# Patient Record
Sex: MALE | Race: WHITE | NOT HISPANIC OR LATINO
[De-identification: names, ages, dates, MRNs, and addresses within clinical notes are randomized per-mention and may not be internally consistent; named-entity substitution may affect disease eponyms.]

---

## 2023-04-21 PROBLEM — Z00.00 ENCOUNTER FOR PREVENTIVE HEALTH EXAMINATION: Status: ACTIVE | Noted: 2023-04-21

## 2023-04-24 ENCOUNTER — APPOINTMENT (OUTPATIENT)
Dept: UROLOGY | Facility: CLINIC | Age: 73
End: 2023-04-24
Payer: MEDICARE

## 2023-04-24 VITALS
OXYGEN SATURATION: 98 % | TEMPERATURE: 97.8 F | HEART RATE: 93 BPM | DIASTOLIC BLOOD PRESSURE: 73 MMHG | SYSTOLIC BLOOD PRESSURE: 110 MMHG

## 2023-04-24 DIAGNOSIS — Z85.46 PERSONAL HISTORY OF MALIGNANT NEOPLASM OF PROSTATE: ICD-10-CM

## 2023-04-24 DIAGNOSIS — Z87.19 PERSONAL HISTORY OF OTHER DISEASES OF THE DIGESTIVE SYSTEM: ICD-10-CM

## 2023-04-24 DIAGNOSIS — Z87.442 PERSONAL HISTORY OF URINARY CALCULI: ICD-10-CM

## 2023-04-24 PROCEDURE — 99204 OFFICE O/P NEW MOD 45 MIN: CPT

## 2023-04-24 RX ORDER — ENALAPRIL MALEATE AND HYDROCHLOROTHIAZIDE 10; 25 MG/1; MG/1
10-25 TABLET ORAL
Qty: 90 | Refills: 0 | Status: ACTIVE | COMMUNITY
Start: 2023-03-12

## 2023-04-24 RX ORDER — FINASTERIDE 5 MG/1
5 TABLET, FILM COATED ORAL
Qty: 90 | Refills: 0 | Status: ACTIVE | COMMUNITY
Start: 2022-12-21

## 2023-04-24 RX ORDER — FUROSEMIDE 20 MG/1
20 TABLET ORAL
Qty: 30 | Refills: 0 | Status: ACTIVE | COMMUNITY
Start: 2022-10-21

## 2023-04-24 RX ORDER — DILTIAZEM HYDROCHLORIDE 180 MG/1
180 CAPSULE, EXTENDED RELEASE ORAL
Qty: 90 | Refills: 0 | Status: ACTIVE | COMMUNITY
Start: 2022-07-12

## 2023-04-24 NOTE — ASSESSMENT
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  We discussed options for managing his retention including continuing indwelling portillo vs. CIC vs. SPT.  We also discussed the possibility of neurostimulation for atonic bladder.  I agree with his previous urologist that a bladder outlet procedure is unlikely to help him void given the lack of bladder function demonstrated on urodynamics. He is willing to learn CIC and will return earlier in the day for CIC teaching.  \par \par Plan:\par 1. RTC for CIC teaching\par 2. Cont indwelling portillo till next visit.

## 2023-04-24 NOTE — PHYSICAL EXAM
[General Appearance - Well Developed] : well developed [Normal Appearance] : normal appearance [Heart Rate And Rhythm] : Heart rate and rhythm were normal [] : no respiratory distress [Abdomen Soft] : soft [Abdomen Tenderness] : non-tender [Abdomen Hernia] : no hernia was discovered [Costovertebral Angle Tenderness] : no ~M costovertebral angle tenderness [Epididymis] : the epididymides were normal [Testes Tenderness] : no tenderness of the testes [Testes Mass (___cm)] : there were no testicular masses [Prostate Tenderness] : the prostate was not tender [No Prostate Nodules] : no prostate nodules [Prostate Size ___ (0-4)] : prostate size [unfilled] (scale: 0-4) [Normal Station and Gait] : the gait and station were normal for the patient's age [Skin Color & Pigmentation] : normal skin color and pigmentation [No Focal Deficits] : no focal deficits [Oriented To Time, Place, And Person] : oriented to person, place, and time [FreeTextEntry1] : Eroded urethral meatus, 18 Fr portillo catheter draining urine

## 2023-04-24 NOTE — HISTORY OF PRESENT ILLNESS
[FreeTextEntry1] : 71 yo male with hx of prostate cancer s/p brachytherapy at least 8 years presents for a second opinion regarding his chronic retention of urine.  He has had a catheter in place since January after going into urinary retention.  His catheter has been changed monthly.  He has failed multiple TOV.  He had a urodynamic test on March 6th which showed a desensate bladder with no detrusor activity.  He has a normal bladder capacity.  He had a cystoscopy in February 2022 which showed a small friable prostate without significant obstruction of the bladder neck.  He has been advised to learn CIC vs. have an SPT placed.  \par \par His catheter was most recently changed last week.  \par \par PSA from April 2023 was < 0.04.  He was recently treated for a proteus UTI.

## 2023-05-15 ENCOUNTER — APPOINTMENT (OUTPATIENT)
Dept: UROLOGY | Facility: CLINIC | Age: 73
End: 2023-05-15
Payer: MEDICARE

## 2023-05-15 PROCEDURE — 99213 OFFICE O/P EST LOW 20 MIN: CPT | Mod: 25

## 2023-05-15 PROCEDURE — 51702 INSERT TEMP BLADDER CATH: CPT

## 2023-05-15 NOTE — HISTORY OF PRESENT ILLNESS
[FreeTextEntry1] : 4/24/23:\par 71 yo male with hx of prostate cancer s/p brachytherapy at least 8 years presents for a second opinion regarding his chronic retention of urine.  He has had a catheter in place since January after going into urinary retention.  His catheter has been changed monthly.  He has failed multiple TOV.  He had a urodynamic test on March 6th which showed a desensate bladder with no detrusor activity.  He has a normal bladder capacity.  He had a cystoscopy in February 2022 which showed a small friable prostate without significant obstruction of the bladder neck.  He has been advised to learn CIC vs. have an SPT placed.  \par \par His catheter was most recently changed last week.  \par \par PSA from April 2023 was < 0.04.  He was recently treated for a proteus UTI. \par \par *************\par 5/15/23:\par Patient returns for follow up for CIC teaching.  \par \par CIC teaching was provided by the nursing staff.  Patient is not able to perform CIC, however his wife is able to perform CIC.  Unfortunately, the patient's wife works long days and is not home from early in the morning till later at night.  She is concerned he would not be able to manage with his urinary issues without a catheter while she is out.

## 2023-05-15 NOTE — ASSESSMENT
[FreeTextEntry1] : 71 yo male with atonic bladder in chronic retention with indwelling catheter.  The catheter was changed today.  The patient will return monthly for catheter changes.  I also discussed the option of placement of a suprapubic cystostomy tube as an alternative to a urethral catheter.  The SP tubes tend to be more comfortable than urethral catheter and it avoids the problem of urethra/penile erosion.  The infection risk, however, is no different than an indwelling urethral catheter.  The patient, his wife, and daughter will consider his options and will notify me of how they would like to manage his catheter long term.  \par \par \par Plan:\par 1. Catheter changed today\par 2. RTC 1 month for catheter change, family to decide on possible SP tube placement.

## 2023-05-15 NOTE — PHYSICAL EXAM
[General Appearance - Well Developed] : well developed [Normal Appearance] : normal appearance [Abdomen Soft] : soft [Penis Abnormality] : normal circumcised penis [Skin Color & Pigmentation] : normal skin color and pigmentation [] : no respiratory distress [Heart Rate And Rhythm] : Heart rate and rhythm were normal [Oriented To Time, Place, And Person] : oriented to person, place, and time [Normal Station and Gait] : the gait and station were normal for the patient's age [No Focal Deficits] : no focal deficits [FreeTextEntry1] : Catheter to gravity drainage

## 2023-06-08 ENCOUNTER — APPOINTMENT (OUTPATIENT)
Dept: UROLOGY | Facility: CLINIC | Age: 73
End: 2023-06-08
Payer: MEDICARE

## 2023-06-08 PROCEDURE — 51702 INSERT TEMP BLADDER CATH: CPT

## 2023-06-12 LAB — BACTERIA UR CULT: NORMAL

## 2023-06-16 ENCOUNTER — APPOINTMENT (OUTPATIENT)
Dept: UROLOGY | Facility: CLINIC | Age: 73
End: 2023-06-16

## 2023-07-03 ENCOUNTER — NON-APPOINTMENT (OUTPATIENT)
Age: 73
End: 2023-07-03

## 2023-07-03 VITALS
OXYGEN SATURATION: 100 % | DIASTOLIC BLOOD PRESSURE: 73 MMHG | HEART RATE: 93 BPM | RESPIRATION RATE: 19 BRPM | SYSTOLIC BLOOD PRESSURE: 139 MMHG | TEMPERATURE: 98 F

## 2023-07-03 LAB
APPEARANCE UR: CLEAR — SIGNIFICANT CHANGE UP
BACTERIA # UR AUTO: PRESENT /HPF
BILIRUB UR-MCNC: NEGATIVE — SIGNIFICANT CHANGE UP
COLOR SPEC: YELLOW — SIGNIFICANT CHANGE UP
COMMENT - URINE: SIGNIFICANT CHANGE UP
DIFF PNL FLD: ABNORMAL
EPI CELLS # UR: SIGNIFICANT CHANGE UP /HPF (ref 0–5)
GLUCOSE UR QL: NEGATIVE — SIGNIFICANT CHANGE UP
KETONES UR-MCNC: NEGATIVE — SIGNIFICANT CHANGE UP
LEUKOCYTE ESTERASE UR-ACNC: ABNORMAL
NITRITE UR-MCNC: NEGATIVE — SIGNIFICANT CHANGE UP
PH UR: >=9 — SIGNIFICANT CHANGE UP (ref 5–8)
PROT UR-MCNC: 30 MG/DL
RBC CASTS # UR COMP ASSIST: ABNORMAL /HPF
SP GR SPEC: 1.01 — SIGNIFICANT CHANGE UP (ref 1–1.03)
TRI-PHOS CRY UR QL COMP ASSIST: ABNORMAL /HPF
UROBILINOGEN FLD QL: 0.2 E.U./DL — SIGNIFICANT CHANGE UP
WBC UR QL: ABNORMAL /HPF

## 2023-07-03 PROCEDURE — 99285 EMERGENCY DEPT VISIT HI MDM: CPT

## 2023-07-03 NOTE — ED ADULT TRIAGE NOTE - ARRIVAL INFO ADDITIONAL COMMENTS
Patient reported to [facility] "blood in urine." Patient with indwelling portillo for chronic BPH. Last reported change for portillo reported 06/08/2023.

## 2023-07-03 NOTE — ED ADULT NURSE NOTE - OBJECTIVE STATEMENT
Pt is 73M [facility] for outpt labs drawn 6/29/2023 showing hgb 8.3 and blood in indwelling urethral catheter. Pt states "when I don't drink the urine is like broth." Pt is A&Ox4, appears pale, w yellow urine draining into leg bag w foul odor. Pt has hx UTI. Denies blood in stool.

## 2023-07-04 ENCOUNTER — INPATIENT (INPATIENT)
Facility: HOSPITAL | Age: 73
LOS: 5 days | Discharge: HOME HEALTH | DRG: 699 | End: 2023-07-10
Attending: STUDENT IN AN ORGANIZED HEALTH CARE EDUCATION/TRAINING PROGRAM | Admitting: STUDENT IN AN ORGANIZED HEALTH CARE EDUCATION/TRAINING PROGRAM
Payer: MEDICARE

## 2023-07-04 ENCOUNTER — NON-APPOINTMENT (OUTPATIENT)
Age: 73
End: 2023-07-04

## 2023-07-04 ENCOUNTER — TRANSCRIPTION ENCOUNTER (OUTPATIENT)
Age: 73
End: 2023-07-04

## 2023-07-04 DIAGNOSIS — E87.20 ACIDOSIS, UNSPECIFIED: ICD-10-CM

## 2023-07-04 DIAGNOSIS — Z87.438 PERSONAL HISTORY OF OTHER DISEASES OF MALE GENITAL ORGANS: ICD-10-CM

## 2023-07-04 DIAGNOSIS — D64.9 ANEMIA, UNSPECIFIED: ICD-10-CM

## 2023-07-04 DIAGNOSIS — I48.91 UNSPECIFIED ATRIAL FIBRILLATION: ICD-10-CM

## 2023-07-04 DIAGNOSIS — I10 ESSENTIAL (PRIMARY) HYPERTENSION: ICD-10-CM

## 2023-07-04 DIAGNOSIS — C61 MALIGNANT NEOPLASM OF PROSTATE: ICD-10-CM

## 2023-07-04 DIAGNOSIS — N17.9 ACUTE KIDNEY FAILURE, UNSPECIFIED: ICD-10-CM

## 2023-07-04 DIAGNOSIS — Z29.9 ENCOUNTER FOR PROPHYLACTIC MEASURES, UNSPECIFIED: ICD-10-CM

## 2023-07-04 DIAGNOSIS — N39.0 URINARY TRACT INFECTION, SITE NOT SPECIFIED: ICD-10-CM

## 2023-07-04 DIAGNOSIS — N31.9 NEUROMUSCULAR DYSFUNCTION OF BLADDER, UNSPECIFIED: ICD-10-CM

## 2023-07-04 LAB
ALBUMIN SERPL ELPH-MCNC: 2.7 G/DL — LOW (ref 3.3–5)
ALBUMIN SERPL ELPH-MCNC: 2.8 G/DL — LOW (ref 3.3–5)
ALBUMIN, RANDOM URINE: 13.9 MG/DL — SIGNIFICANT CHANGE UP
ALBUMIN/CREATININE RATIO (ACR): 556 MG/G — HIGH (ref 0–30)
ALP SERPL-CCNC: 73 U/L — SIGNIFICANT CHANGE UP (ref 40–120)
ALP SERPL-CCNC: 82 U/L — SIGNIFICANT CHANGE UP (ref 40–120)
ALT FLD-CCNC: 10 U/L — SIGNIFICANT CHANGE UP (ref 10–45)
ALT FLD-CCNC: 11 U/L — SIGNIFICANT CHANGE UP (ref 10–45)
ANION GAP SERPL CALC-SCNC: 12 MMOL/L — SIGNIFICANT CHANGE UP (ref 5–17)
ANION GAP SERPL CALC-SCNC: 14 MMOL/L — SIGNIFICANT CHANGE UP (ref 5–17)
APTT BLD: 28.6 SEC — SIGNIFICANT CHANGE UP (ref 27.5–35.5)
AST SERPL-CCNC: 14 U/L — SIGNIFICANT CHANGE UP (ref 10–40)
AST SERPL-CCNC: 18 U/L — SIGNIFICANT CHANGE UP (ref 10–40)
BASE EXCESS BLDV CALC-SCNC: -7 MMOL/L — LOW (ref -2–3)
BASOPHILS # BLD AUTO: 0.03 K/UL — SIGNIFICANT CHANGE UP (ref 0–0.2)
BASOPHILS # BLD AUTO: 0.03 K/UL — SIGNIFICANT CHANGE UP (ref 0–0.2)
BASOPHILS NFR BLD AUTO: 0.3 % — SIGNIFICANT CHANGE UP (ref 0–2)
BASOPHILS NFR BLD AUTO: 0.4 % — SIGNIFICANT CHANGE UP (ref 0–2)
BILIRUB SERPL-MCNC: 0.2 MG/DL — SIGNIFICANT CHANGE UP (ref 0.2–1.2)
BILIRUB SERPL-MCNC: 0.2 MG/DL — SIGNIFICANT CHANGE UP (ref 0.2–1.2)
BLD GP AB SCN SERPL QL: NEGATIVE — SIGNIFICANT CHANGE UP
BUN SERPL-MCNC: 72 MG/DL — HIGH (ref 7–23)
BUN SERPL-MCNC: 78 MG/DL — HIGH (ref 7–23)
CA-I SERPL-SCNC: 1.29 MMOL/L — SIGNIFICANT CHANGE UP (ref 1.15–1.33)
CALCIUM SERPL-MCNC: 8.7 MG/DL — SIGNIFICANT CHANGE UP (ref 8.4–10.5)
CALCIUM SERPL-MCNC: 8.8 MG/DL — SIGNIFICANT CHANGE UP (ref 8.4–10.5)
CHLORIDE SERPL-SCNC: 104 MMOL/L — SIGNIFICANT CHANGE UP (ref 96–108)
CHLORIDE SERPL-SCNC: 107 MMOL/L — SIGNIFICANT CHANGE UP (ref 96–108)
CO2 BLDV-SCNC: 18.8 MMOL/L — LOW (ref 22–26)
CO2 SERPL-SCNC: 15 MMOL/L — LOW (ref 22–31)
CO2 SERPL-SCNC: 17 MMOL/L — LOW (ref 22–31)
CREAT ?TM UR-MCNC: 25 MG/DL — SIGNIFICANT CHANGE UP
CREAT ?TM UR-MCNC: 25 MG/DL — SIGNIFICANT CHANGE UP
CREAT ?TM UR-MCNC: 26 MG/DL — SIGNIFICANT CHANGE UP
CREAT SERPL-MCNC: 4.25 MG/DL — HIGH (ref 0.5–1.3)
CREAT SERPL-MCNC: 4.3 MG/DL — HIGH (ref 0.5–1.3)
EGFR: 14 ML/MIN/1.73M2 — LOW
EGFR: 14 ML/MIN/1.73M2 — LOW
EOSINOPHIL # BLD AUTO: 0 K/UL — SIGNIFICANT CHANGE UP (ref 0–0.5)
EOSINOPHIL # BLD AUTO: 0 K/UL — SIGNIFICANT CHANGE UP (ref 0–0.5)
EOSINOPHIL NFR BLD AUTO: 0 % — SIGNIFICANT CHANGE UP (ref 0–6)
EOSINOPHIL NFR BLD AUTO: 0 % — SIGNIFICANT CHANGE UP (ref 0–6)
FERRITIN SERPL-MCNC: 133 NG/ML — SIGNIFICANT CHANGE UP (ref 30–400)
GAS PNL BLDV: 130 MMOL/L — LOW (ref 136–145)
GAS PNL BLDV: SIGNIFICANT CHANGE UP
GAS PNL BLDV: SIGNIFICANT CHANGE UP
GLUCOSE SERPL-MCNC: 84 MG/DL — SIGNIFICANT CHANGE UP (ref 70–99)
GLUCOSE SERPL-MCNC: 97 MG/DL — SIGNIFICANT CHANGE UP (ref 70–99)
HAV IGM SER-ACNC: SIGNIFICANT CHANGE UP
HBV CORE AB SER-ACNC: SIGNIFICANT CHANGE UP
HBV CORE IGM SER-ACNC: SIGNIFICANT CHANGE UP
HBV SURFACE AB SER-ACNC: SIGNIFICANT CHANGE UP
HBV SURFACE AG SER-ACNC: SIGNIFICANT CHANGE UP
HCO3 BLDV-SCNC: 18 MMOL/L — LOW (ref 22–29)
HCT VFR BLD CALC: 24.5 % — LOW (ref 39–50)
HCT VFR BLD CALC: 25 % — LOW (ref 39–50)
HCV AB S/CO SERPL IA: 0.05 S/CO — SIGNIFICANT CHANGE UP
HCV AB SERPL-IMP: SIGNIFICANT CHANGE UP
HGB BLD-MCNC: 7.7 G/DL — LOW (ref 13–17)
HGB BLD-MCNC: 8.1 G/DL — LOW (ref 13–17)
IMM GRANULOCYTES NFR BLD AUTO: 0.3 % — SIGNIFICANT CHANGE UP (ref 0–0.9)
IMM GRANULOCYTES NFR BLD AUTO: 0.5 % — SIGNIFICANT CHANGE UP (ref 0–0.9)
INR BLD: 1.37 — HIGH (ref 0.88–1.16)
IRON SATN MFR SERPL: 13 % — LOW (ref 16–55)
IRON SATN MFR SERPL: 22 UG/DL — LOW (ref 45–165)
LACTATE SERPL-SCNC: 0.6 MMOL/L — SIGNIFICANT CHANGE UP (ref 0.5–2)
LYMPHOCYTES # BLD AUTO: 1.21 K/UL — SIGNIFICANT CHANGE UP (ref 1–3.3)
LYMPHOCYTES # BLD AUTO: 1.36 K/UL — SIGNIFICANT CHANGE UP (ref 1–3.3)
LYMPHOCYTES # BLD AUTO: 13.5 % — SIGNIFICANT CHANGE UP (ref 13–44)
LYMPHOCYTES # BLD AUTO: 16.6 % — SIGNIFICANT CHANGE UP (ref 13–44)
MAGNESIUM SERPL-MCNC: 1.6 MG/DL — SIGNIFICANT CHANGE UP (ref 1.6–2.6)
MCHC RBC-ENTMCNC: 27.6 PG — SIGNIFICANT CHANGE UP (ref 27–34)
MCHC RBC-ENTMCNC: 28.1 PG — SIGNIFICANT CHANGE UP (ref 27–34)
MCHC RBC-ENTMCNC: 31.4 GM/DL — LOW (ref 32–36)
MCHC RBC-ENTMCNC: 32.4 GM/DL — SIGNIFICANT CHANGE UP (ref 32–36)
MCV RBC AUTO: 86.8 FL — SIGNIFICANT CHANGE UP (ref 80–100)
MCV RBC AUTO: 87.8 FL — SIGNIFICANT CHANGE UP (ref 80–100)
MONOCYTES # BLD AUTO: 0.77 K/UL — SIGNIFICANT CHANGE UP (ref 0–0.9)
MONOCYTES # BLD AUTO: 0.97 K/UL — HIGH (ref 0–0.9)
MONOCYTES NFR BLD AUTO: 10.6 % — SIGNIFICANT CHANGE UP (ref 2–14)
MONOCYTES NFR BLD AUTO: 9.6 % — SIGNIFICANT CHANGE UP (ref 2–14)
NEUTROPHILS # BLD AUTO: 5.26 K/UL — SIGNIFICANT CHANGE UP (ref 1.8–7.4)
NEUTROPHILS # BLD AUTO: 7.66 K/UL — HIGH (ref 1.8–7.4)
NEUTROPHILS NFR BLD AUTO: 72.1 % — SIGNIFICANT CHANGE UP (ref 43–77)
NEUTROPHILS NFR BLD AUTO: 76.1 % — SIGNIFICANT CHANGE UP (ref 43–77)
NRBC # BLD: 0 /100 WBCS — SIGNIFICANT CHANGE UP (ref 0–0)
NRBC # BLD: 0 /100 WBCS — SIGNIFICANT CHANGE UP (ref 0–0)
OSMOLALITY UR: 206 MOSM/KG — LOW (ref 300–900)
PCO2 BLDV: 33 MMHG — LOW (ref 42–55)
PH BLDV: 7.34 — SIGNIFICANT CHANGE UP (ref 7.32–7.43)
PHOSPHATE SERPL-MCNC: 4.1 MG/DL — SIGNIFICANT CHANGE UP (ref 2.5–4.5)
PLATELET # BLD AUTO: 242 K/UL — SIGNIFICANT CHANGE UP (ref 150–400)
PLATELET # BLD AUTO: 263 K/UL — SIGNIFICANT CHANGE UP (ref 150–400)
PO2 BLDV: 61 MMHG — HIGH (ref 25–45)
POTASSIUM BLDV-SCNC: 4.5 MMOL/L — SIGNIFICANT CHANGE UP (ref 3.5–5.1)
POTASSIUM SERPL-MCNC: 4.4 MMOL/L — SIGNIFICANT CHANGE UP (ref 3.5–5.3)
POTASSIUM SERPL-MCNC: 4.6 MMOL/L — SIGNIFICANT CHANGE UP (ref 3.5–5.3)
POTASSIUM SERPL-SCNC: 4.4 MMOL/L — SIGNIFICANT CHANGE UP (ref 3.5–5.3)
POTASSIUM SERPL-SCNC: 4.6 MMOL/L — SIGNIFICANT CHANGE UP (ref 3.5–5.3)
PROT ?TM UR-MCNC: 42 MG/DL — HIGH (ref 0–12)
PROT SERPL-MCNC: 5.8 G/DL — LOW (ref 6–8.3)
PROT SERPL-MCNC: 6.3 G/DL — SIGNIFICANT CHANGE UP (ref 6–8.3)
PROT/CREAT UR-RTO: 1.7 RATIO — HIGH (ref 0–0.2)
PROTHROM AB SERPL-ACNC: 16.3 SEC — HIGH (ref 10.5–13.4)
RBC # BLD: 2.79 M/UL — LOW (ref 4.2–5.8)
RBC # BLD: 2.88 M/UL — LOW (ref 4.2–5.8)
RBC # FLD: 14.7 % — HIGH (ref 10.3–14.5)
RBC # FLD: 14.7 % — HIGH (ref 10.3–14.5)
RH IG SCN BLD-IMP: POSITIVE — SIGNIFICANT CHANGE UP
SAO2 % BLDV: 93.3 % — HIGH (ref 67–88)
SODIUM SERPL-SCNC: 133 MMOL/L — LOW (ref 135–145)
SODIUM SERPL-SCNC: 136 MMOL/L — SIGNIFICANT CHANGE UP (ref 135–145)
SODIUM UR-SCNC: 47 MMOL/L — SIGNIFICANT CHANGE UP
TIBC SERPL-MCNC: 168 UG/DL — LOW (ref 220–430)
TRANSFERRIN SERPL-MCNC: 139 MG/DL — LOW (ref 200–360)
UIBC SERPL-MCNC: 146 UG/DL — SIGNIFICANT CHANGE UP (ref 110–370)
UUN UR-MCNC: 224 MG/DL — SIGNIFICANT CHANGE UP
WBC # BLD: 10.07 K/UL — SIGNIFICANT CHANGE UP (ref 3.8–10.5)
WBC # BLD: 7.29 K/UL — SIGNIFICANT CHANGE UP (ref 3.8–10.5)
WBC # FLD AUTO: 10.07 K/UL — SIGNIFICANT CHANGE UP (ref 3.8–10.5)
WBC # FLD AUTO: 7.29 K/UL — SIGNIFICANT CHANGE UP (ref 3.8–10.5)

## 2023-07-04 PROCEDURE — 76770 US EXAM ABDO BACK WALL COMP: CPT | Mod: 26

## 2023-07-04 PROCEDURE — 99223 1ST HOSP IP/OBS HIGH 75: CPT | Mod: GC

## 2023-07-04 PROCEDURE — 71045 X-RAY EXAM CHEST 1 VIEW: CPT | Mod: 26

## 2023-07-04 PROCEDURE — 99221 1ST HOSP IP/OBS SF/LOW 40: CPT

## 2023-07-04 RX ORDER — FINASTERIDE 5 MG/1
5 TABLET, FILM COATED ORAL DAILY
Refills: 0 | Status: DISCONTINUED | OUTPATIENT
Start: 2023-07-04 | End: 2023-07-05

## 2023-07-04 RX ORDER — PIPERACILLIN AND TAZOBACTAM 4; .5 G/20ML; G/20ML
4.5 INJECTION, POWDER, LYOPHILIZED, FOR SOLUTION INTRAVENOUS EVERY 12 HOURS
Refills: 0 | Status: DISCONTINUED | OUTPATIENT
Start: 2023-07-05 | End: 2023-07-05

## 2023-07-04 RX ORDER — DILTIAZEM HCL 120 MG
180 CAPSULE, EXT RELEASE 24 HR ORAL EVERY 24 HOURS
Refills: 0 | Status: DISCONTINUED | OUTPATIENT
Start: 2023-07-04 | End: 2023-07-05

## 2023-07-04 RX ORDER — LATANOPROST 0.05 MG/ML
1 SOLUTION/ DROPS OPHTHALMIC; TOPICAL AT BEDTIME
Refills: 0 | Status: DISCONTINUED | OUTPATIENT
Start: 2023-07-04 | End: 2023-07-05

## 2023-07-04 RX ORDER — CEFTRIAXONE 500 MG/1
1000 INJECTION, POWDER, FOR SOLUTION INTRAMUSCULAR; INTRAVENOUS ONCE
Refills: 0 | Status: COMPLETED | OUTPATIENT
Start: 2023-07-04 | End: 2023-07-04

## 2023-07-04 RX ORDER — DILTIAZEM HCL 120 MG
240 CAPSULE, EXT RELEASE 24 HR ORAL EVERY 24 HOURS
Refills: 0 | Status: DISCONTINUED | OUTPATIENT
Start: 2023-07-04 | End: 2023-07-04

## 2023-07-04 RX ORDER — PIPERACILLIN AND TAZOBACTAM 4; .5 G/20ML; G/20ML
4.5 INJECTION, POWDER, LYOPHILIZED, FOR SOLUTION INTRAVENOUS ONCE
Refills: 0 | Status: COMPLETED | OUTPATIENT
Start: 2023-07-04 | End: 2023-07-04

## 2023-07-04 RX ORDER — SODIUM BICARBONATE 1 MEQ/ML
650 SYRINGE (ML) INTRAVENOUS THREE TIMES A DAY
Refills: 0 | Status: DISCONTINUED | OUTPATIENT
Start: 2023-07-04 | End: 2023-07-05

## 2023-07-04 RX ORDER — LANOLIN ALCOHOL/MO/W.PET/CERES
3 CREAM (GRAM) TOPICAL AT BEDTIME
Refills: 0 | Status: DISCONTINUED | OUTPATIENT
Start: 2023-07-04 | End: 2023-07-05

## 2023-07-04 RX ORDER — PANTOPRAZOLE SODIUM 20 MG/1
40 TABLET, DELAYED RELEASE ORAL
Refills: 0 | Status: DISCONTINUED | OUTPATIENT
Start: 2023-07-04 | End: 2023-07-05

## 2023-07-04 RX ORDER — SODIUM CHLORIDE 9 MG/ML
1000 INJECTION INTRAMUSCULAR; INTRAVENOUS; SUBCUTANEOUS ONCE
Refills: 0 | Status: COMPLETED | OUTPATIENT
Start: 2023-07-04 | End: 2023-07-04

## 2023-07-04 RX ORDER — PIPERACILLIN AND TAZOBACTAM 4; .5 G/20ML; G/20ML
4.5 INJECTION, POWDER, LYOPHILIZED, FOR SOLUTION INTRAVENOUS ONCE
Refills: 0 | Status: COMPLETED | OUTPATIENT
Start: 2023-07-05 | End: 2023-07-05

## 2023-07-04 RX ORDER — TAMSULOSIN HYDROCHLORIDE 0.4 MG/1
0.4 CAPSULE ORAL AT BEDTIME
Refills: 0 | Status: DISCONTINUED | OUTPATIENT
Start: 2023-07-04 | End: 2023-07-05

## 2023-07-04 RX ORDER — SENNA PLUS 8.6 MG/1
2 TABLET ORAL AT BEDTIME
Refills: 0 | Status: DISCONTINUED | OUTPATIENT
Start: 2023-07-04 | End: 2023-07-05

## 2023-07-04 RX ORDER — POLYETHYLENE GLYCOL 3350 17 G/17G
17 POWDER, FOR SOLUTION ORAL DAILY
Refills: 0 | Status: DISCONTINUED | OUTPATIENT
Start: 2023-07-04 | End: 2023-07-05

## 2023-07-04 RX ORDER — MAGNESIUM SULFATE 500 MG/ML
2 VIAL (ML) INJECTION ONCE
Refills: 0 | Status: COMPLETED | OUTPATIENT
Start: 2023-07-04 | End: 2023-07-04

## 2023-07-04 RX ORDER — ACETAMINOPHEN 500 MG
650 TABLET ORAL EVERY 6 HOURS
Refills: 0 | Status: DISCONTINUED | OUTPATIENT
Start: 2023-07-04 | End: 2023-07-05

## 2023-07-04 RX ADMIN — LATANOPROST 1 DROP(S): 0.05 SOLUTION/ DROPS OPHTHALMIC; TOPICAL at 23:24

## 2023-07-04 RX ADMIN — POLYETHYLENE GLYCOL 3350 17 GRAM(S): 17 POWDER, FOR SOLUTION ORAL at 12:52

## 2023-07-04 RX ADMIN — CEFTRIAXONE 100 MILLIGRAM(S): 500 INJECTION, POWDER, FOR SOLUTION INTRAMUSCULAR; INTRAVENOUS at 01:01

## 2023-07-04 RX ADMIN — Medication 650 MILLIGRAM(S): at 07:34

## 2023-07-04 RX ADMIN — Medication 650 MILLIGRAM(S): at 14:39

## 2023-07-04 RX ADMIN — TAMSULOSIN HYDROCHLORIDE 0.4 MILLIGRAM(S): 0.4 CAPSULE ORAL at 21:48

## 2023-07-04 RX ADMIN — Medication 25 GRAM(S): at 14:39

## 2023-07-04 RX ADMIN — SODIUM CHLORIDE 1000 MILLILITER(S): 9 INJECTION INTRAMUSCULAR; INTRAVENOUS; SUBCUTANEOUS at 02:13

## 2023-07-04 RX ADMIN — Medication 180 MILLIGRAM(S): at 07:33

## 2023-07-04 RX ADMIN — FINASTERIDE 5 MILLIGRAM(S): 5 TABLET, FILM COATED ORAL at 12:49

## 2023-07-04 RX ADMIN — Medication 650 MILLIGRAM(S): at 21:48

## 2023-07-04 RX ADMIN — PIPERACILLIN AND TAZOBACTAM 200 GRAM(S): 4; .5 INJECTION, POWDER, LYOPHILIZED, FOR SOLUTION INTRAVENOUS at 18:01

## 2023-07-04 RX ADMIN — PANTOPRAZOLE SODIUM 40 MILLIGRAM(S): 20 TABLET, DELAYED RELEASE ORAL at 07:35

## 2023-07-04 NOTE — H&P ADULT - PROBLEM SELECTOR PLAN 5
takes enalapril-HCTZ 10-25 at home takes enalapril-HCTZ 10-25 at home. Will hold ACEi while working up worsening ANIA.   - resume HCTZ as appropriate Has hx of AFib but not on AC 2/2 prior bleed. Has diltiazem 180mg qD and toprol 100mg BID with him  - c/w home diltiazem 180mg qD  - obtain collateral from his PCP regarding AC and medications

## 2023-07-04 NOTE — H&P ADULT - NSHPLABSRESULTS_GEN_ALL_CORE
8.1    10.07 )-----------( 263      ( 04 Jul 2023 00:00 )             25.0       07-04    133<L>  |  104  |  78<H>  ----------------------------<  84  4.6   |  15<L>  |  4.25<H>    Ca    8.7      04 Jul 2023 00:00    TPro  6.3  /  Alb  2.8<L>  /  TBili  0.2  /  DBili  x   /  AST  18  /  ALT  11  /  AlkPhos  82  07-04              Urinalysis Basic - ( 04 Jul 2023 00:00 )    Color: x / Appearance: x / SG: x / pH: x  Gluc: 84 mg/dL / Ketone: x  / Bili: x / Urobili: x   Blood: x / Protein: x / Nitrite: x   Leuk Esterase: x / RBC: x / WBC x   Sq Epi: x / Non Sq Epi: x / Bacteria: x        PT/INR - ( 04 Jul 2023 00:00 )   PT: 16.3 sec;   INR: 1.37          PTT - ( 04 Jul 2023 00:00 )  PTT:28.6 sec    Lactate Trend  07-04 @ 00:00 Lactate:0.6             CAPILLARY BLOOD GLUCOSE

## 2023-07-04 NOTE — ED PROVIDER NOTE - PROGRESS NOTE DETAILS
[FreeTextEntry1] : This is a 46 year old woman with SVT s/p ablation. No recurrent tachycardia. Echo last year normal. \par \par Follow up in 1 year.  [place]: Hgb 8.1, INR 1.37, Na 133, BUN/cr increased to 78/4.25, bicarb 15, albumin 2.8, other labs grossly wnl. UA+. CXR w/ no significant findings. Given worsening ANIA (unknown baseline but was 3. a few days ago) will give IVF and admit for further care. Pt remains well appearing and clinically stable.   EKG shows possible afib - pt unsure if he has hx of afib but wife states he was on AC in the past (from his cardiologist) but was taken off it 2/2 bleeding.  Updated pt/wife.

## 2023-07-04 NOTE — H&P ADULT - HISTORY OF PRESENT ILLNESS
72M somewhat poor historian PMH Afib (not on AC), HTN, GERD, ?CKD, GERD, prostate cancer s/p brachytherapy, chronic indwelling portillo presents for fever of 101F at home and abnormal labs. He reports going to his GI for abdominal pain 4 days ago and was informed about Hgb 8.3 and BUN/Cr 75/3.72. He does not know his baseline kidney function but reports it being abnormal previously. Reports poor PO intake over the last week. Portillo last changed 4 weeks ago. Denies n/v, diarrhea, current abd pain.     In the ED, VSS.  72M somewhat poor historian PMH Afib (not on AC), HTN, GERD, ?CKD, GERD, prostate cancer s/p brachytherapy, chronic indwelling portillo presents for fever of 101F at home and abnormal labs. He reports going to his GI for abdominal pain 4 days ago and was informed about Hgb 8.3 and BUN/Cr 75/3.72. He does not know his baseline kidney function but reports it being abnormal previously. Reports poor PO intake over the last week. Portillo last changed 4 weeks ago. Denies n/v, diarrhea, current abd pain.     In the ED, VSS.   Given CTX 1g, 1L NS. 72M somewhat poor historian PMH Afib (not on AC), HTN, GERD,  prostate cancer s/p brachytherapy, chronic indwelling portillo presents for fever of 101F at home and abnormal labs. He reports going to his GI for abdominal pain 4 days ago and was informed about Hgb 8.3 and BUN/Cr 75/3.72. He does not know his baseline kidney function but reports it being abnormal previously. Reports poor PO intake over the last week. Portillo last changed 4 weeks ago. Denies n/v, diarrhea, current abd pain.     In the ED, VSS.   Given CTX 1g, 1L NS.

## 2023-07-04 NOTE — PATIENT PROFILE ADULT - FUNCTIONAL ASSESSMENT - BASIC MOBILITY 6.
2-calculated by average/Not able to assess (calculate score using [facility] averaging method)

## 2023-07-04 NOTE — PROGRESS NOTE ADULT - PROBLEM SELECTOR PLAN 1
baseline Cr 0.9 per pts outpatient urologist Dr Robledo but he reports having abnormal renal function in the past. Labs from last week with BUN/Cr 75/3.72. Making urine. At baseline mental status. Reports poor PO intake over the past week. Also with UTI. Perhaps pre-renal aspect. UA with protein and blood. Takes lasix 20mg qD as home med. ?CKD  - f/u urine studies Uosm, Liza, Uurea, UCr  - urine ACR  - will order GN workup hepatitis panel, dsDNA, ALTON, ANCA w/reflex, C3, C4, COLLIN, SPEP, free light chains, PLA2R, and anti-GBM   - renal sono  - strict Is and Os  - f/c renal recs

## 2023-07-04 NOTE — ED PROVIDER NOTE - OBJECTIVE STATEMENT
72M PMH HTN, GERD, ?CKD, GERD, prostate cancer s/p brachytherapy, chronic indwelling portillo presents w/ several complaints. Main concern is fever 101 today. Also states that 4d ago he went to GI for abd pain (abd pain now completely resolved) and had blood work taken - daughter states that the doctor told them to go to ER for abnormal results - has results on phone - hgb was 8.3, BUN/cr was 75/3.72, no other significant abnormalities. Unknown baseline cr - but states pt was hospitalized at [facility] ~3 months ago for something similar. On ROS pt also notes 3-4 days of decreased appetite. Also chronic unchanged b/l LE edema. No other systemic symptoms. Portillo last exchanged ~4w ago. States urine is yellow and draining normally.   Denies SOB, CP, URI symptoms, NVD, current abd pain, focal weakness/numbness, HA.

## 2023-07-04 NOTE — H&P ADULT - PROBLEM SELECTOR PLAN 2
UA positive and pt with hx of abdominal pain that is now resolved. [place]ro[place] exchanged in ED. Given CTX 1g  - f/u UCx UA positive and pt with hx of abdominal pain that is now resolved. [place] exchanged in ED. Given CTX 1g  - f/u UCx  - c/w CTX UA positive and pt with hx of abdominal pain that is now resolved. Reports fever at home but not febrile on admission. [place] exchanged in ED. Given CTX 1g  - f/u UCx, blood cx  - c/w CTX for 3 days

## 2023-07-04 NOTE — H&P ADULT - ATTENDING COMMENTS
Patient was seen and examined at bedside. Case discuss with resident. 72M somewhat poor historian PMH Afib (not on AC), HTN, GERD, ?CKD, GERD, prostate cancer s/p brachytherapy, chronic indwelling portillo presents for fever of 101 found to have UTI and started on CTX. In addition pt found to have a creatinine of 4 with unknown baseline.     T(C): 36.1 (04 Jul 2023 09:11), Max: 36.8 (04 Jul 2023 07:32)  T(F): 97 (04 Jul 2023 09:11), Max: 98.3 (04 Jul 2023 07:32)  HR: 69 (04 Jul 2023 09:11) (69 - 101)  BP: 114/69 (04 Jul 2023 09:11) (112/73 - 139/73)  RR: 18 (04 Jul 2023 09:11) (18 - 19)  SpO2: 95% (04 Jul 2023 09:11) (95% - 100%)    PE: NAD laying in bed; family at bedside  CTA b/l no wheezing or crackles   obese nontender nondistended    Additional exam as  per resident note documented above                          7.7    7.29  )-----------( 242      ( 04 Jul 2023 05:30 )             24.5     136  |  107  |  72<H>  ----------------------------<  97  4.4   |  17<L>  |  4.30<H>    Ca    8.8      04 Jul 2023 05:30  Phos  4.1     07-04  Mg     1.6     07-04    TPro  5.8<L>  /  Alb  2.7<L>  /  TBili  0.2  /  DBili  x   /  AST  14  /  ALT  10  /  AlkPhos  73  07-04     PT: 16.3 sec;   INR: 1.37    PTT:28.6 sec    Renal US:  Bilateral renal cysts and parenchymal disease.  Moderate bilateral hydronephrosis.    acetaminophen     Tablet .. 650 milliGRAM(s) Oral every 6 hours PRN  diltiazem    milliGRAM(s) Oral every 24 hours  finasteride 5 milliGRAM(s) Oral daily  latanoprost 0.005% Ophthalmic Solution 1 Drop(s) Both EYES at bedtime  melatonin 3 milliGRAM(s) Oral at bedtime PRN  pantoprazole    Tablet 40 milliGRAM(s) Oral before breakfast  polyethylene glycol 3350 17 Gram(s) Oral daily  senna 2 Tablet(s) Oral at bedtime  sodium bicarbonate 650 milliGRAM(s) Oral three times a day  tamsulosin 0.4 milliGRAM(s) Oral at bedtime      A/P: 72M somewhat poor historian PMH Afib (not on AC), HTN, , ?CKD, GERD, prostate cancer s/p brachytherapy, chronic indwelling portillo presents for fever found to have a UTI as well as ANIA with creatinine of 4.3 with unknown baseline.     #UTI   -Will continue CTX   -Will f/u urine cx     #AINA  #B/l moderate hydronephrosis  -Will attempt to obtain info about baseline creatinine from [phone]   -Renal evaluation     #Chronic indwelling portillo   #Prostate cancer s/p brachytherapy  - Continue tamsulosin and finasteride     #Afib not on AC 2/2 hx of bleeding   - Continue diltiazem     #DISPO  - PT evaluation Patient was seen and examined at bedside. Case discuss with resident. 72M somewhat poor historian PMH Afib (not on AC), HTN, GERD, ?CKD, GERD, prostate cancer s/p brachytherapy, chronic indwelling portillo presents for fever of 101 found to have UTI and started on CTX. In addition pt found to have a creatinine of 4 with unknown baseline.     T(C): 36.1 (04 Jul 2023 09:11), Max: 36.8 (04 Jul 2023 07:32)  T(F): 97 (04 Jul 2023 09:11), Max: 98.3 (04 Jul 2023 07:32)  HR: 69 (04 Jul 2023 09:11) (69 - 101)  BP: 114/69 (04 Jul 2023 09:11) (112/73 - 139/73)  RR: 18 (04 Jul 2023 09:11) (18 - 19)  SpO2: 95% (04 Jul 2023 09:11) (95% - 100%)    PE: NAD laying in bed; family at bedside  CTA b/l no wheezing or crackles   obese nontender nondistended    Additional exam as  per resident note documented above                          7.7    7.29  )-----------( 242      ( 04 Jul 2023 05:30 )             24.5     136  |  107  |  72<H>  ----------------------------<  97  4.4   |  17<L>  |  4.30<H>    Ca    8.8      04 Jul 2023 05:30  Phos  4.1     07-04  Mg     1.6     07-04    TPro  5.8<L>  /  Alb  2.7<L>  /  TBili  0.2  /  DBili  x   /  AST  14  /  ALT  10  /  AlkPhos  73  07-04     PT: 16.3 sec;   INR: 1.37    PTT:28.6 sec    Renal US:  Bilateral renal cysts and parenchymal disease.  Moderate bilateral hydronephrosis.    acetaminophen     Tablet .. 650 milliGRAM(s) Oral every 6 hours PRN  diltiazem    milliGRAM(s) Oral every 24 hours  finasteride 5 milliGRAM(s) Oral daily  latanoprost 0.005% Ophthalmic Solution 1 Drop(s) Both EYES at bedtime  melatonin 3 milliGRAM(s) Oral at bedtime PRN  pantoprazole    Tablet 40 milliGRAM(s) Oral before breakfast  polyethylene glycol 3350 17 Gram(s) Oral daily  senna 2 Tablet(s) Oral at bedtime  sodium bicarbonate 650 milliGRAM(s) Oral three times a day  tamsulosin 0.4 milliGRAM(s) Oral at bedtime      A/P: 72M somewhat poor historian PMH Afib (not on AC), HTN, , ?CKD, GERD, prostate cancer s/p brachytherapy, chronic indwelling portillo presents for fever found to have a UTI as well as ANIA with creatinine of 4.3 with unknown baseline.     #UTI   -Will continue CTX   -Will f/u urine cx     #ANIA  #B/l moderate hydronephrosis  -Will attempt to obtain info about baseline creatinine from PCP Dr. Bush [phone]   -Renal evaluation     #Chronic indwelling portillo   #Prostate cancer s/p brachytherapy  - Continue tamsulosin and finasteride     #Afib not on AC 2/2 hx of bleeding   - Continue diltiazem     #DISPO  - PT evaluation Patient was seen and examined at bedside. Case discuss with resident. 72M somewhat poor historian PMH Afib (not on AC), HTN, GERD, ?CKD, GERD, prostate cancer s/p brachytherapy, chronic indwelling portillo presents for fever of 101 found to have UTI and started on CTX. In addition pt found to have a creatinine of 4 with unknown baseline.     T(C): 36.1 (04 Jul 2023 09:11), Max: 36.8 (04 Jul 2023 07:32)  T(F): 97 (04 Jul 2023 09:11), Max: 98.3 (04 Jul 2023 07:32)  HR: 69 (04 Jul 2023 09:11) (69 - 101)  BP: 114/69 (04 Jul 2023 09:11) (112/73 - 139/73)  RR: 18 (04 Jul 2023 09:11) (18 - 19)  SpO2: 95% (04 Jul 2023 09:11) (95% - 100%)    PE: NAD laying in bed; family at bedside  CTA b/l no wheezing or crackles   obese nontender nondistended    Additional exam as  per resident note documented above                          7.7    7.29  )-----------( 242      ( 04 Jul 2023 05:30 )             24.5     136  |  107  |  72<H>  ----------------------------<  97  4.4   |  17<L>  |  4.30<H>    Ca    8.8      04 Jul 2023 05:30  Phos  4.1     07-04  Mg     1.6     07-04    TPro  5.8<L>  /  Alb  2.7<L>  /  TBili  0.2  /  DBili  x   /  AST  14  /  ALT  10  /  AlkPhos  73  07-04     PT: 16.3 sec;   INR: 1.37    PTT:28.6 sec    Renal US:  Bilateral renal cysts and parenchymal disease.  Moderate bilateral hydronephrosis.    acetaminophen     Tablet .. 650 milliGRAM(s) Oral every 6 hours PRN  diltiazem    milliGRAM(s) Oral every 24 hours  finasteride 5 milliGRAM(s) Oral daily  latanoprost 0.005% Ophthalmic Solution 1 Drop(s) Both EYES at bedtime  melatonin 3 milliGRAM(s) Oral at bedtime PRN  pantoprazole    Tablet 40 milliGRAM(s) Oral before breakfast  polyethylene glycol 3350 17 Gram(s) Oral daily  senna 2 Tablet(s) Oral at bedtime  sodium bicarbonate 650 milliGRAM(s) Oral three times a day  tamsulosin 0.4 milliGRAM(s) Oral at bedtime      A/P: 72M somewhat poor historian PMH Afib (not on AC), HTN, , ?CKD, GERD, prostate cancer s/p brachytherapy, chronic indwelling portillo presents for fever found to have a UTI as well as ANIA with creatinine of 4.3 with unknown baseline.     #UTI   -Will continue CTX   -Will f/u urine cx     #ANIA  #B/l moderate hydronephrosis  -Attempted  to obtain info about baseline creatinine from PCP Dr. Bush [phone] however office closed  -Renal evaluation   -Urology consult for hydronephrosis     #Chronic indwelling portillo   #Prostate cancer s/p brachytherapy  - Continue tamsulosin and finasteride     #Afib not on AC 2/2 hx of bleeding   - Continue diltiazem     #DISPO  - PT evaluation Patient was seen and examined at bedside. Case discuss with resident. 72M somewhat poor historian PMH Afib (not on AC), HTN, GERD, ?CKD, GERD, prostate cancer s/p brachytherapy, chronic indwelling portillo presents for fever of 101 found to have UTI and started on CTX. In addition pt found to have a creatinine of 4 with unknown baseline.     T(C): 36.1 (04 Jul 2023 09:11), Max: 36.8 (04 Jul 2023 07:32)  T(F): 97 (04 Jul 2023 09:11), Max: 98.3 (04 Jul 2023 07:32)  HR: 69 (04 Jul 2023 09:11) (69 - 101)  BP: 114/69 (04 Jul 2023 09:11) (112/73 - 139/73)  RR: 18 (04 Jul 2023 09:11) (18 - 19)  SpO2: 95% (04 Jul 2023 09:11) (95% - 100%)    PE: NAD laying in bed; family at bedside  CTA b/l no wheezing or crackles   obese nontender nondistended    Additional exam as  per resident note documented above                          7.7    7.29  )-----------( 242      ( 04 Jul 2023 05:30 )             24.5     136  |  107  |  72<H>  ----------------------------<  97  4.4   |  17<L>  |  4.30<H>    Ca    8.8      04 Jul 2023 05:30  Phos  4.1     07-04  Mg     1.6     07-04    TPro  5.8<L>  /  Alb  2.7<L>  /  TBili  0.2  /  DBili  x   /  AST  14  /  ALT  10  /  AlkPhos  73  07-04     PT: 16.3 sec;   INR: 1.37    PTT:28.6 sec    Renal US:  Bilateral renal cysts and parenchymal disease.  Moderate bilateral hydronephrosis.    acetaminophen     Tablet .. 650 milliGRAM(s) Oral every 6 hours PRN  diltiazem    milliGRAM(s) Oral every 24 hours  finasteride 5 milliGRAM(s) Oral daily  latanoprost 0.005% Ophthalmic Solution 1 Drop(s) Both EYES at bedtime  melatonin 3 milliGRAM(s) Oral at bedtime PRN  pantoprazole    Tablet 40 milliGRAM(s) Oral before breakfast  polyethylene glycol 3350 17 Gram(s) Oral daily  senna 2 Tablet(s) Oral at bedtime  sodium bicarbonate 650 milliGRAM(s) Oral three times a day  tamsulosin 0.4 milliGRAM(s) Oral at bedtime      A/P: 72M somewhat poor historian PMH Afib (not on AC), HTN, , ?CKD, GERD, prostate cancer s/p brachytherapy, chronic indwelling portillo presents for fever found to have a UTI as well as ANIA with creatinine of 4.3 with unknown baseline.     #UTI   -Will continue CTX   -Will f/u urine cx     #ANIA  #B/l moderate hydronephrosis  -Attempted  to obtain info about baseline creatinine from PCP Dr. Bush [phone] however office closed  -Renal evaluation   -Pt w/  hydronephrosis on renal US; Will attempt to obtain additional hx if this is old or new and pending information will order CT abd/pelvis w/o contrast; /Urology eval    #Chronic indwelling portillo   #Prostate cancer s/p brachytherapy  - Continue tamsulosin and finasteride     #Afib not on AC 2/2 hx of bleeding   - Continue diltiazem     #DISPO  - PT evaluation

## 2023-07-04 NOTE — CHART NOTE - NSCHARTNOTEFT_GEN_A_CORE
72M w/ HTN, ?CKD, prostate cancer s/p brachytherapy, chronic indwelling portillo presents for fever of 101F at home and abnormal labs. He reports going to his GI for abdominal pain 4 days ago and was informed about Hgb 8.3 and BUN/Cr 75/3.72. He does not know his baseline kidney function but reports it being abnormal previously. Reports poor PO intake over the last week. Portillo last changed 4 weeks ago. Denies n/v, diarrhea, current abd pain.     #Non-oliguric ANIA  BCr not know. On admission Cr 4.2-->4.3  UA w/ hematuria, proteinuria (UPCR 1.7)  Liza 47  US w/ bilateral mod hydro, Rt 14.3cm, Lt 14.1cm, b/l increased echogenicity, decompressed bladder  Etiology likely post-renal obstruction. Given has had decreased PO intake and slight improvement in BUN after 1L fluid bolus, may have component of pre-renal     Recommend:  Appreciate urology input, pending CT   If no urinary obstruction on imaging, no signs of vol overload, will give 100ml/hr NS x 5 hours  Lytes and vol status stable, no indication of HD  Monitor UO  Avoid nephrotoxic meds, ACEi/ARBs, NSAIDs. Dose meds for eGFR<15      Full consult note to follow in [facility]   PGY-5 Nephrology  [phone]

## 2023-07-04 NOTE — PROGRESS NOTE ADULT - PROBLEM SELECTOR PLAN 5
Has hx of AFib but not on AC 2/2 prior bleed. Has diltiazem 180mg qD and toprol 100mg BID with him  - c/w home diltiazem 180mg qD  - obtain collateral from his PCP regarding AC and medications

## 2023-07-04 NOTE — H&P ADULT - PROBLEM/PLAN-6
PIV attempted x 1 without success, additional RN to bedside for US IV placement.    DISPLAY PLAN FREE TEXT

## 2023-07-04 NOTE — H&P ADULT - ASSESSMENT
72M somewhat poor historian PMH Afib (not on AC), HTN, GERD, ?CKD, GERD, prostate cancer s/p brachytherapy, chronic indwelling portillo presents for fever of 101F at home and abnormal labs

## 2023-07-04 NOTE — H&P ADULT - PROBLEM SELECTOR PLAN 7
F: PO  E: replete PRN  N: dash   DVT ppx: HSQ  GI PPx: None    FULL CODE    Dispo: [facility] - c/w home flomax and finasteride

## 2023-07-04 NOTE — CONSULT NOTE ADULT - PROBLEM SELECTOR RECOMMENDATION 9
-no emergnet  surgical intervention at this time  -please obtain STAT CT A/P non con  -FeNa 5.8% concerning for post obstructive ANIA in the setting of chronic portillo- decompressed bladder on u/s  -baseline Cr 0.92 continue to trend  -nephrology f/u  -Continue catheter at this time  -Ua negative for nit- history of pseudomonas UTI- treat with empiric abx covering pseudomonas and f/u Ucx  -urology to follow  -rest of care as per primary team

## 2023-07-04 NOTE — ED PROVIDER NOTE - CLINICAL SUMMARY MEDICAL DECISION MAKING FREE TEXT BOX
72M PMH HTN, GERD, ?CKD, GERD, prostate cancer s/p brachytherapy, chronic indwelling portillo presents w/ several complaints. Main concern is fever 101 today. Also states that 4d ago he went to GI for abd pain (abd pain now completely resolved) and had blood work taken - daughter states that the doctor told them to go to ER for abnormal results - has results on phone - hgb was 8.3, BUN/cr was 75/3.72, no other significant abnormalities. Unknown baseline cr - but states pt was hospitalized at [facility] ~3 months ago for something similar. On ROS pt also notes 3-4 days of decreased appetite. Also chronic unchanged b/l LE edema. No other systemic symptoms. Portillo last exchanged ~4w ago. States urine is yellow and draining normally.   Vitals wnl, exam as above.  ddx: Possible UTI, possible ANIA/CKD. Has no symptoms of acute anemia.   Labs, CXR, UA/portillo exchange, empiric abx, reassess.   Pt is on lasix and has b/l LE edema - clinically has no pulmonary edema. Holding IVF for now.  Attempted to call [facility] ER but unable to get in touch w/ anyone to get collateral.

## 2023-07-04 NOTE — ED PROVIDER NOTE - CARE PLAN
1 Principal Discharge DX:	ANIA (acute kidney injury)  Secondary Diagnosis:	Urinary tract infection

## 2023-07-04 NOTE — ED ADULT NURSE REASSESSMENT NOTE - NS ED NURSE REASSESS COMMENT FT1
Monterroso catheter inserted using sterile technique, draining by gravity, secured with StatLock. Yellow urine output

## 2023-07-04 NOTE — H&P ADULT - PROBLEM SELECTOR PLAN 1
unknown baseline but he reports having abnormal renal function in the past. Labs from last week with BUN/Cr 75/3.72. Making urine. At baseline mental status. Takes lasix 20mg qD as home med. Reports poor PO intake over the past week. Also with UTI. Perhaps pre-renal aspect. UA with protein and blood.   - f/u urine studies Uosm, Liza, Uurea, UCr  - urine ACR  - will order GN workup  - renal sono  - strict Is and Os  - renal consult in AM unknown baseline but he reports having abnormal renal function in the past. Labs from last week with BUN/Cr 75/3.72. Making urine. At baseline mental status. Reports poor PO intake over the past week. Also with UTI. Perhaps pre-renal aspect. UA with protein and blood. Takes lasix 20mg qD as home med.  - f/u urine studies Uosm, Liza, Uurea, UCr  - urine ACR  - will order GN workup  - renal sono  - strict Is and Os  - renal consult in AM unknown baseline but he reports having abnormal renal function in the past. Labs from last week with BUN/Cr 75/3.72. Making urine. At baseline mental status. Reports poor PO intake over the past week. Also with UTI. Perhaps pre-renal aspect. UA with protein and blood. Takes lasix 20mg qD as home med.  - f/u urine studies Uosm, Liza, Uurea, UCr  - urine ACR  - will order GN workup hepatitis panel, dsDNA, ALTON, ANCA w/reflex, C3, C4, COLLIN, SPEP, free light chains, PLA2R, and anti-GBM   - renal sono  - strict Is and Os  - renal consult in AM unknown baseline but he reports having abnormal renal function in the past. Labs from last week with BUN/Cr 75/3.72. Making urine. At baseline mental status. Reports poor PO intake over the past week. Also with UTI. Perhaps pre-renal aspect. UA with protein and blood. Takes lasix 20mg qD as home med. ?CKD  - f/u urine studies Uosm, Liza, Uurea, UCr  - urine ACR  - will order GN workup hepatitis panel, dsDNA, ALTON, ANCA w/reflex, C3, C4, COLLIN, SPEP, free light chains, PLA2R, and anti-GBM   - renal sono  - strict Is and Os  - renal consult in AM

## 2023-07-04 NOTE — PROGRESS NOTE ADULT - ASSESSMENT
72M somewhat poor historian PMH Afib (not on AC), HTN, CKD, prostate cancer s/p brachytherapy, chronic indwelling portillo presents for fever of 101F at home and abnormal labs

## 2023-07-04 NOTE — PATIENT PROFILE ADULT - NSTRANSFERBELONGINGSDISPO_GEN_A_NUR
Chief Complaint   Patient presents with   • Medical Problem Re-evaluation        HPI:  Interval history:  3/14/17: Reports that she still has pain in the back of the neck and shoulders. She does exercises once a day in the middle of the day. 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 She continues to have pain on her both feet. She wears steel toed shoes at work.   16: Still has Pain on the back of the neck and shoulders but not bad. She took Tylenol 2 times in the past month. She does neck and shoulder exercises almost once a day.  16: Itching has resolved. Complains of pain on the back of her neck and shoulders. She does a lot of repetitious motion working as a  in a factory. She has 12 hour shift.       *Bilateral foot pain-  3/14/17: Both feet still hurts at the area off the calluses on sides of big toes and little toes. See above.  17: feels pain and heat sensation after walking all day. 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 Reports that she does not really do anything to care for her feet.    *Abnormality noted in Pap smear-   3/14/17: Saw Dr. Reyes and had endometrial biopsy on 3/9/17. Result was benign-fragments of proliferative endometrium, no hyperplasia or malignancy     Preventative:  Nutrition:  Fruits: 1-2/day  Ve-1 cup/day  Water: 4  16 oz/day  Caffeine: 2 cups/day, 1 tsp sugar  Sodium intake: Cooks every day, limits salt when cooking    Alcohol: 0    Exercise:   Not much but active at work  Does not do joint exercises      Last pap:   Never had abn pap in past. No known HPV.  H/o STD in past: no    Menstrual Hx:   Menarche: 13 y/o        LMP: 16-present  Menopause: not yet    OB/gyn:       Mammogram: long time ago    Occupation:     ALLERGIES:  No Known Allergies    No current outpatient prescriptions on file.     No current facility-administered medications for this visit.          History   Smoking Status   • Never  Smoker   Smokeless Tobacco   • Never Used       ROS:  See HPI    Exam:     Gen: well nourished, properly groomed, awake, alert, NAD, pleasant  Blood pressure 112/60, pulse 85, temperature 98.8 °F (37.1 °C), temperature source Tympanic, resp. rate 12, height 5' 4\" (1.626 m), weight 99.8 kg, last menstrual period 2017.   neck: No spine tenderness. + Tightness and Diffuse tenderness on paracervical or and both trapezius muscles  Back: No spine or paraspinal tenderness  Gait: Normal, no limp  Lower extremities: Dorsalis pedis and posterior tibial pulses 2+ bilateral,  capillary refill <3 seconds  No edema, no redness; + tender calluses and corns on the lateral aspect of the fifth toes and the medial aspect of halluces on both feet    Most recent labs:discussed with patient  Office Visit on 2017   Component Date Value Ref Range Status   • Pathology Report 2017    Final                    Value:Name: NAVDEEP ROQUE           MRN:     [id number]    /Age:1967 (Age: 49)           Visit#:  [id number]    Sex: F                        Pathology Report        Client: [facility]        Submitting Physician: Dav Reyes MD        Date Specimen Collected: 17           Accession #:  [id number]    Date Specimen Received:  17           Requisition #:[id number]    Date Reported:           3/10/2017 14:01   Location:     [facility]        ______________________________________________________________________________    Pathologic Diagnosis :    Endometrium, biopsy:    - Markedly fragmented proliferative-type endometrium with possible focal    glandular breakdown        Kindra Acuna MD    ** Electronic Signature (MAR) 3/10/2017 14:01 **    ______________________________________________________________________________        Clinical Information:    WHAT IS THE SPECIMEN TYPE:ENDOMETRIAL BIOPSY    HOW MANY SPECIMENS ARE BEING SUBMITTE                           D?:1    NUMBER OF CONTAINERS?:1    WHAT IS THE TIME SPECIMEN PLACED IN FORMALIN?:0821    TIME SPECIMEN OBTAINED:0821        Specimen(s) Submitted:     ENDOMETRIAL BIOPSY        Gross Description:    Received in formalin, labeled with the patient's name and \"MT-endo biopsy\" is    a 1.2 cm aggregate of red-brown soft tissues, blood and mucus which are    submitted in toto in one cassette.        LMP 3/9/2017 03:06 PM            Microscopic Description:    Sections demonstrate markedly fragmented proliferative-type endometrium with    possible glandular breakdown. The glandular to stroma ratio cannot be    appreciated due to marked inflammation. Small fragment of inflamed    endocervical tissue with squamous metaplasia and reactive changes is present.    No evidence of malignancy is identified.        MAR/mar 03/10/17        Fee Codes:     A: [id number], [id number]        Performing Lab Location (Unless otherwise specified):    [place] [address]           Diagnoses at this visit include:  1. Cervicalgia    2. Corns and callosities    3. Bilateral foot pain        Medications prescribed and Orders from this visit:  Orders Placed This Encounter   • XR CERVICAL SPINE 2-3 VW   • XR CERVICAL SPINE 2-3 VW   • SERVICE TO PHYSICAL THERAPY     Scraping of calluses and corns 2 in each foot = 4 total  were done in the office using a 10-gauge scalpel blade. Patient tolerated procedure well.    Patient Instructions   Do neck, shoulder, leg and feet exercises 3 times a day  Massage the back of her neck and her shoulders gently at least 3 times a day after you exercise them.  When you get home put your legs up even just for 2 minutes and put ice on the areas that are hurting. Then apply lotion on your feet.  Apply body lotion 2 times a day.  Continue using pumice stone to scrape the calluses on your feet at least 3 times a week. Apply lotion to your feet  after using the pumice stone.  When you go home today apply lotion on her feet.  Scheduled the physical therapy and come back here next month.  Get the neck x-ray. We will call you when we have the result of this test.  Healthy lifestyle: Regular exercise, good nutrition, stress management, lose weight.          Follow Up:  Return in about 4 weeks (around 4/11/2017).         with patient

## 2023-07-04 NOTE — H&P ADULT - PROBLEM SELECTOR PLAN 3
unknown baseline. Possibly 2/2 worsening CKD.  - iron studies 2/2 worsening renal function.   - start sodium bicarb tabs TID

## 2023-07-04 NOTE — ED PROVIDER NOTE - PHYSICAL EXAMINATION
portillo appears in place, slightly cloudy yellow urine in leg bag  2+ b/l Le pitting edema w/ hyperpigmented skin - pt states this is his baseline

## 2023-07-04 NOTE — PATIENT PROFILE ADULT - FALL HARM RISK - HARM RISK INTERVENTIONS
Assistance with ambulation/Assistance OOB with selected safe patient handling equipment/Communicate Risk of Fall with Harm to all staff/Discuss with provider need for PT consult/Monitor gait and stability/Reinforce activity limits and safety measures with patient and family/Tailored Fall Risk Interventions/Visual Cue: Yellow wristband and red socks/Bed in lowest position, wheels locked, appropriate side rails in place/Call bell, personal items and telephone in reach/Instruct patient to call for assistance before getting out of bed or chair/Non-slip footwear when patient is out of bed/Orange to call system/Physically safe environment - no spills, clutter or unnecessary equipment/Purposeful Proactive Rounding/Room/bathroom lighting operational, light cord in reach

## 2023-07-04 NOTE — H&P ADULT - PROBLEM SELECTOR PLAN 4
Has hx of AFib but not on AC 2/2 prior bleed.   - c/w home diltiazem 240mg qD  - obtain collateral from his cardiologist regarding AC Has hx of AFib but not on AC 2/2 prior bleed. Has diltiazem 180mg qD and toprol 100mg BID with him  - c/w home diltiazem 180mg qD  - obtain collateral from his PCP regarding AC and medications unknown baseline. Possibly 2/2 worsening CKD.  - iron studies

## 2023-07-04 NOTE — H&P ADULT - NSHPPHYSICALEXAM_GEN_ALL_CORE
.  VITAL SIGNS:  T(C): 36.8 (07-04-23 @ 07:32), Max: 36.8 (07-04-23 @ 07:32)  T(F): 98.3 (07-04-23 @ 07:32), Max: 98.3 (07-04-23 @ 07:32)  HR: 87 (07-04-23 @ 07:32) (73 - 101)  BP: 112/73 (07-04-23 @ 07:32) (112/73 - 139/73)  BP(mean): --  RR: 18 (07-04-23 @ 07:32) (18 - 19)  SpO2: 96% (07-04-23 @ 07:32) (96% - 100%)  Wt(kg): --    PHYSICAL EXAM:    Constitutional: resting comfortably in bed; NAD  Head: NC/AT  Eyes: EOMI, anicteric sclera  ENT: no nasal discharge; MMM  Neck: supple; no JVD or thyromegaly  Respiratory: CTA B/L; no W/R/R  Cardiac: +S1/S2; irregularly irregular; no M/R/G  Gastrointestinal: abdomen soft, NT/ND; no rebound or guarding  Back: no CVAT B/L  Extremities: WWP, no clubbing or cyanosis; +2 edema to b/l shins, chronic venous stasis changes  Musculoskeletal: moving all extremities  Vascular: 2+ radial, DP/PT pulses B/L  Lymphatic: no submandibular or cervical LAD  Neurologic: AAOx3; no focal deficits  Psychiatric: affect and characteristics of appearance, verbalizations, behaviors are appropriate

## 2023-07-04 NOTE — H&P ADULT - NS ATTEST RISK PROBLEM GEN_ALL_CORE FT
#UTI  #ANIA  #B/l moderate hydronephrosis  #Afib not on AC 2/2 hx of bleeding  #Chronic indwelling portillo   #Prostate cancer s/p brachytherapy

## 2023-07-04 NOTE — H&P ADULT - PROBLEM SELECTOR PLAN 6
- c/w home flomax and finasteride takes enalapril-HCTZ 10-25 at home. Will hold ACEi while working up worsening ANIA.   - resume HCTZ as appropriate

## 2023-07-04 NOTE — H&P ADULT - PROBLEM SELECTOR PLAN 8
F: PO  E: replete PRN  N: dash   DVT ppx: HSQ  GI PPx: None    FULL CODE    Dispo: [facility]

## 2023-07-05 DIAGNOSIS — N13.30 UNSPECIFIED HYDRONEPHROSIS: ICD-10-CM

## 2023-07-05 LAB
ALBUMIN SERPL ELPH-MCNC: 2.8 G/DL — LOW (ref 3.3–5)
ALP SERPL-CCNC: 72 U/L — SIGNIFICANT CHANGE UP (ref 40–120)
ALT FLD-CCNC: 10 U/L — SIGNIFICANT CHANGE UP (ref 10–45)
ANION GAP SERPL CALC-SCNC: 12 MMOL/L — SIGNIFICANT CHANGE UP (ref 5–17)
APPEARANCE UR: CLEAR — SIGNIFICANT CHANGE UP
AST SERPL-CCNC: 13 U/L — SIGNIFICANT CHANGE UP (ref 10–40)
AUTO DIFF PNL BLD: NEGATIVE — SIGNIFICANT CHANGE UP
BACTERIA # UR AUTO: ABNORMAL /HPF
BASOPHILS # BLD AUTO: 0.05 K/UL — SIGNIFICANT CHANGE UP (ref 0–0.2)
BASOPHILS NFR BLD AUTO: 0.6 % — SIGNIFICANT CHANGE UP (ref 0–2)
BILIRUB SERPL-MCNC: 0.3 MG/DL — SIGNIFICANT CHANGE UP (ref 0.2–1.2)
BILIRUB UR-MCNC: NEGATIVE — SIGNIFICANT CHANGE UP
BLD GP AB SCN SERPL QL: NEGATIVE — SIGNIFICANT CHANGE UP
BUN SERPL-MCNC: 71 MG/DL — HIGH (ref 7–23)
C-ANCA SER-ACNC: NEGATIVE — SIGNIFICANT CHANGE UP
C3 SERPL-MCNC: 126 MG/DL — SIGNIFICANT CHANGE UP (ref 81–157)
C4 SERPL-MCNC: 30 MG/DL — SIGNIFICANT CHANGE UP (ref 13–39)
CALCIUM SERPL-MCNC: 9.5 MG/DL — SIGNIFICANT CHANGE UP (ref 8.4–10.5)
CHLORIDE SERPL-SCNC: 104 MMOL/L — SIGNIFICANT CHANGE UP (ref 96–108)
CO2 SERPL-SCNC: 18 MMOL/L — LOW (ref 22–31)
COLOR SPEC: YELLOW — SIGNIFICANT CHANGE UP
COMMENT - URINE: SIGNIFICANT CHANGE UP
CREAT SERPL-MCNC: 4.68 MG/DL — HIGH (ref 0.5–1.3)
CULTURE RESULTS: SIGNIFICANT CHANGE UP
DIFF PNL FLD: ABNORMAL
DSDNA AB SER-ACNC: <12 IU/ML — SIGNIFICANT CHANGE UP
EGFR: 12 ML/MIN/1.73M2 — LOW
EOSINOPHIL # BLD AUTO: 0 K/UL — SIGNIFICANT CHANGE UP (ref 0–0.5)
EOSINOPHIL NFR BLD AUTO: 0 % — SIGNIFICANT CHANGE UP (ref 0–6)
EPI CELLS # UR: SIGNIFICANT CHANGE UP /HPF (ref 0–5)
GLUCOSE SERPL-MCNC: 98 MG/DL — SIGNIFICANT CHANGE UP (ref 70–99)
GLUCOSE UR QL: NEGATIVE — SIGNIFICANT CHANGE UP
HCT VFR BLD CALC: 25.9 % — LOW (ref 39–50)
HGB BLD-MCNC: 8.1 G/DL — LOW (ref 13–17)
IMM GRANULOCYTES NFR BLD AUTO: 0.4 % — SIGNIFICANT CHANGE UP (ref 0–0.9)
INTERPRETATION SERPL IFE-IMP: SIGNIFICANT CHANGE UP
KAPPA LC SER QL IFE: 11.5 MG/DL — HIGH (ref 0.33–1.94)
KAPPA/LAMBDA FREE LIGHT CHAIN RATIO, SERUM: 1.79 RATIO — HIGH (ref 0.26–1.65)
KETONES UR-MCNC: NEGATIVE — SIGNIFICANT CHANGE UP
LAMBDA LC SER QL IFE: 6.42 MG/DL — HIGH (ref 0.57–2.63)
LEUKOCYTE ESTERASE UR-ACNC: ABNORMAL
LYMPHOCYTES # BLD AUTO: 1.1 K/UL — SIGNIFICANT CHANGE UP (ref 1–3.3)
LYMPHOCYTES # BLD AUTO: 13.4 % — SIGNIFICANT CHANGE UP (ref 13–44)
MAGNESIUM SERPL-MCNC: 1.8 MG/DL — SIGNIFICANT CHANGE UP (ref 1.6–2.6)
MCHC RBC-ENTMCNC: 27.6 PG — SIGNIFICANT CHANGE UP (ref 27–34)
MCHC RBC-ENTMCNC: 31.3 GM/DL — LOW (ref 32–36)
MCV RBC AUTO: 88.1 FL — SIGNIFICANT CHANGE UP (ref 80–100)
MONOCYTES # BLD AUTO: 0.83 K/UL — SIGNIFICANT CHANGE UP (ref 0–0.9)
MONOCYTES NFR BLD AUTO: 10.1 % — SIGNIFICANT CHANGE UP (ref 2–14)
NEUTROPHILS # BLD AUTO: 6.17 K/UL — SIGNIFICANT CHANGE UP (ref 1.8–7.4)
NEUTROPHILS NFR BLD AUTO: 75.5 % — SIGNIFICANT CHANGE UP (ref 43–77)
NITRITE UR-MCNC: NEGATIVE — SIGNIFICANT CHANGE UP
NRBC # BLD: 0 /100 WBCS — SIGNIFICANT CHANGE UP (ref 0–0)
P-ANCA SER-ACNC: NEGATIVE — SIGNIFICANT CHANGE UP
PH UR: 7 — SIGNIFICANT CHANGE UP (ref 5–8)
PHOSPHATE SERPL-MCNC: 3.8 MG/DL — SIGNIFICANT CHANGE UP (ref 2.5–4.5)
PLATELET # BLD AUTO: 276 K/UL — SIGNIFICANT CHANGE UP (ref 150–400)
POTASSIUM SERPL-MCNC: 4.6 MMOL/L — SIGNIFICANT CHANGE UP (ref 3.5–5.3)
POTASSIUM SERPL-SCNC: 4.6 MMOL/L — SIGNIFICANT CHANGE UP (ref 3.5–5.3)
PROT SERPL-MCNC: 6.1 G/DL — SIGNIFICANT CHANGE UP (ref 6–8.3)
PROT UR-MCNC: 30 MG/DL
RBC # BLD: 2.94 M/UL — LOW (ref 4.2–5.8)
RBC # FLD: 14.7 % — HIGH (ref 10.3–14.5)
RBC CASTS # UR COMP ASSIST: ABNORMAL /HPF
RH IG SCN BLD-IMP: POSITIVE — SIGNIFICANT CHANGE UP
SODIUM SERPL-SCNC: 134 MMOL/L — LOW (ref 135–145)
SP GR SPEC: 1.01 — SIGNIFICANT CHANGE UP (ref 1–1.03)
SPECIMEN SOURCE: SIGNIFICANT CHANGE UP
UROBILINOGEN FLD QL: 0.2 E.U./DL — SIGNIFICANT CHANGE UP
WBC # BLD: 8.18 K/UL — SIGNIFICANT CHANGE UP (ref 3.8–10.5)
WBC # FLD AUTO: 8.18 K/UL — SIGNIFICANT CHANGE UP (ref 3.8–10.5)
WBC UR QL: ABNORMAL /HPF

## 2023-07-05 PROCEDURE — 99233 SBSQ HOSP IP/OBS HIGH 50: CPT

## 2023-07-05 PROCEDURE — 52000 CYSTOURETHROSCOPY: CPT

## 2023-07-05 PROCEDURE — 74176 CT ABD & PELVIS W/O CONTRAST: CPT | Mod: 26

## 2023-07-05 PROCEDURE — 99223 1ST HOSP IP/OBS HIGH 75: CPT

## 2023-07-05 DEVICE — GUIDEWIRE ZIPWIRE STANDARD STRAIGHT .025" X 150CM: Type: IMPLANTABLE DEVICE | Status: FUNCTIONAL

## 2023-07-05 DEVICE — GUIDEWIRE SENSOR DUAL-FLEX NITINOL STRAIGHT .038" X 150CM: Type: IMPLANTABLE DEVICE | Status: FUNCTIONAL

## 2023-07-05 DEVICE — URETERAL CATH OPEN END 5FR 70CM: Type: IMPLANTABLE DEVICE | Status: FUNCTIONAL

## 2023-07-05 RX ORDER — HYDROMORPHONE HYDROCHLORIDE 2 MG/ML
0.25 INJECTION INTRAMUSCULAR; INTRAVENOUS; SUBCUTANEOUS ONCE
Refills: 0 | Status: DISCONTINUED | OUTPATIENT
Start: 2023-07-05 | End: 2023-07-05

## 2023-07-05 RX ORDER — SODIUM CHLORIDE 9 MG/ML
1000 INJECTION, SOLUTION INTRAVENOUS
Refills: 0 | Status: DISCONTINUED | OUTPATIENT
Start: 2023-07-05 | End: 2023-07-05

## 2023-07-05 RX ORDER — PANTOPRAZOLE SODIUM 20 MG/1
40 TABLET, DELAYED RELEASE ORAL
Refills: 0 | Status: DISCONTINUED | OUTPATIENT
Start: 2023-07-05 | End: 2023-07-10

## 2023-07-05 RX ORDER — ACETAMINOPHEN 500 MG
650 TABLET ORAL EVERY 6 HOURS
Refills: 0 | Status: DISCONTINUED | OUTPATIENT
Start: 2023-07-05 | End: 2023-07-10

## 2023-07-05 RX ORDER — OXYBUTYNIN CHLORIDE 5 MG
5 TABLET ORAL EVERY 8 HOURS
Refills: 0 | Status: DISCONTINUED | OUTPATIENT
Start: 2023-07-05 | End: 2023-07-10

## 2023-07-05 RX ORDER — TAMSULOSIN HYDROCHLORIDE 0.4 MG/1
0.4 CAPSULE ORAL AT BEDTIME
Refills: 0 | Status: DISCONTINUED | OUTPATIENT
Start: 2023-07-05 | End: 2023-07-10

## 2023-07-05 RX ORDER — PIPERACILLIN AND TAZOBACTAM 4; .5 G/20ML; G/20ML
4.5 INJECTION, POWDER, LYOPHILIZED, FOR SOLUTION INTRAVENOUS EVERY 8 HOURS
Refills: 0 | Status: DISCONTINUED | OUTPATIENT
Start: 2023-07-05 | End: 2023-07-07

## 2023-07-05 RX ORDER — DILTIAZEM HCL 120 MG
180 CAPSULE, EXT RELEASE 24 HR ORAL EVERY 24 HOURS
Refills: 0 | Status: DISCONTINUED | OUTPATIENT
Start: 2023-07-05 | End: 2023-07-10

## 2023-07-05 RX ORDER — ACETAMINOPHEN 500 MG
1000 TABLET ORAL ONCE
Refills: 0 | Status: COMPLETED | OUTPATIENT
Start: 2023-07-05 | End: 2023-07-05

## 2023-07-05 RX ORDER — POLYETHYLENE GLYCOL 3350 17 G/17G
17 POWDER, FOR SOLUTION ORAL DAILY
Refills: 0 | Status: DISCONTINUED | OUTPATIENT
Start: 2023-07-05 | End: 2023-07-08

## 2023-07-05 RX ORDER — SENNA PLUS 8.6 MG/1
2 TABLET ORAL AT BEDTIME
Refills: 0 | Status: DISCONTINUED | OUTPATIENT
Start: 2023-07-05 | End: 2023-07-08

## 2023-07-05 RX ORDER — LATANOPROST 0.05 MG/ML
1 SOLUTION/ DROPS OPHTHALMIC; TOPICAL AT BEDTIME
Refills: 0 | Status: DISCONTINUED | OUTPATIENT
Start: 2023-07-05 | End: 2023-07-10

## 2023-07-05 RX ORDER — LANOLIN ALCOHOL/MO/W.PET/CERES
3 CREAM (GRAM) TOPICAL AT BEDTIME
Refills: 0 | Status: DISCONTINUED | OUTPATIENT
Start: 2023-07-05 | End: 2023-07-10

## 2023-07-05 RX ORDER — FINASTERIDE 5 MG/1
5 TABLET, FILM COATED ORAL DAILY
Refills: 0 | Status: DISCONTINUED | OUTPATIENT
Start: 2023-07-05 | End: 2023-07-10

## 2023-07-05 RX ADMIN — LATANOPROST 1 DROP(S): 0.05 SOLUTION/ DROPS OPHTHALMIC; TOPICAL at 22:42

## 2023-07-05 RX ADMIN — HYDROMORPHONE HYDROCHLORIDE 0.25 MILLIGRAM(S): 2 INJECTION INTRAMUSCULAR; INTRAVENOUS; SUBCUTANEOUS at 19:20

## 2023-07-05 RX ADMIN — TAMSULOSIN HYDROCHLORIDE 0.4 MILLIGRAM(S): 0.4 CAPSULE ORAL at 22:42

## 2023-07-05 RX ADMIN — Medication 5 MILLIGRAM(S): at 19:35

## 2023-07-05 RX ADMIN — Medication 650 MILLIGRAM(S): at 13:48

## 2023-07-05 RX ADMIN — PIPERACILLIN AND TAZOBACTAM 25 GRAM(S): 4; .5 INJECTION, POWDER, LYOPHILIZED, FOR SOLUTION INTRAVENOUS at 00:35

## 2023-07-05 RX ADMIN — FINASTERIDE 5 MILLIGRAM(S): 5 TABLET, FILM COATED ORAL at 12:21

## 2023-07-05 RX ADMIN — Medication 650 MILLIGRAM(S): at 06:22

## 2023-07-05 RX ADMIN — PIPERACILLIN AND TAZOBACTAM 25 GRAM(S): 4; .5 INJECTION, POWDER, LYOPHILIZED, FOR SOLUTION INTRAVENOUS at 12:20

## 2023-07-05 RX ADMIN — PANTOPRAZOLE SODIUM 40 MILLIGRAM(S): 20 TABLET, DELAYED RELEASE ORAL at 06:22

## 2023-07-05 RX ADMIN — FINASTERIDE 5 MILLIGRAM(S): 5 TABLET, FILM COATED ORAL at 22:42

## 2023-07-05 RX ADMIN — HYDROMORPHONE HYDROCHLORIDE 0.25 MILLIGRAM(S): 2 INJECTION INTRAMUSCULAR; INTRAVENOUS; SUBCUTANEOUS at 20:17

## 2023-07-05 RX ADMIN — Medication 400 MILLIGRAM(S): at 18:57

## 2023-07-05 RX ADMIN — PIPERACILLIN AND TAZOBACTAM 25 GRAM(S): 4; .5 INJECTION, POWDER, LYOPHILIZED, FOR SOLUTION INTRAVENOUS at 19:11

## 2023-07-05 RX ADMIN — Medication 180 MILLIGRAM(S): at 06:22

## 2023-07-05 NOTE — PROGRESS NOTE ADULT - ATTENDING COMMENTS
72M somewhat poor historian PMH Afib (not on AC), HTN, , ?CKD, GERD, prostate cancer s/p brachytherapy, chronic indwelling portillo presents for fever found to have a UTI as well as ANIA with creatinine of 4.3 with unknown baseline.     #UTI   -Will continue CTX   -Renal US:  Bilateral renal cysts and parenchymal disease.  Moderate bilateral hydronephrosis.  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  -Will f/u urine cx cw     #ANIA  #low serum bicarb   #B/l moderate hydronephrosis  - baseline creatinine 0.92 from PCP Dr. Bush [phone]    - Urology planning to take pt to OR for cystoscopy and bilateral ureteral stent placement 7/5  - cr 4.6 today   - continue to monitor   - will hold bicarb   - fu renal recs     #Chronic indwelling portillo   #Prostate cancer s/p brachytherapy  - Continue tamsulosin and finasteride     #Afib not on AC 2/2 hx of bleeding   - Continue diltiazem     #DISPO  - PT evaluation  - uro intervention 72M somewhat poor historian PMH Afib (not on AC), HTN, , ?CKD, GERD, prostate cancer s/p brachytherapy, chronic indwelling portillo presents for fever found to have a UTI as well as ANIA with creatinine of 4.3 with unknown baseline.     #UTI   -Will continue zosyn as pt has hx of pseudomonas in the past- however no symptoms of UTI will likely dc abx after 5 days   -Renal US:  Bilateral renal cysts and parenchymal disease.  Moderate bilateral hydronephrosis.  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  -UA contaminated --- Will f/u urine cx cw     #ANIA  #low serum bicarb   #B/l moderate hydronephrosis  - baseline creatinine 0.92 from PCP Dr. Bush [phone]    - Urology planning to take pt to OR for cystoscopy and bilateral ureteral stent placement 7/5  - cr 4.6 today   - continue to monitor   - will hold bicarb   - fu renal recs     #Chronic indwelling portillo   #Prostate cancer s/p brachytherapy  - Continue tamsulosin and finasteride     #Afib not on AC 2/2 hx of bleeding   - Continue diltiazem     #DISPO  - PT evaluation  - uro intervention

## 2023-07-05 NOTE — BRIEF OPERATIVE NOTE - OPERATION/FINDINGS
Significant global edema with evidence of mucosal abnormalities concerning for possible malignancy versus chronic inflammatory changes.

## 2023-07-05 NOTE — CHART NOTE - NSCHARTNOTEFT_GEN_A_CORE
Now s/p cystoscopy, attempted bilateral ureteral stent insertion, however was unsuccessful.     -Recommend IR consult for bilateral percutaneous nephrostomy tube placement  -NPO after midnight  -Pre-op labs (CBC, BMP, coags, active T&S) Now s/p cystoscopy, attempted bilateral ureteral stent insertion, however was unsuccessful.     -Recommend IR consult for bilateral percutaneous nephrostomy tube placement  -NPO after midnight  -Pre-op labs (CBC, BMP, coags, active T&S)  -Continue CBI overnight, will monitor urine color and re-evaluate in AM

## 2023-07-05 NOTE — PRE-ANESTHESIA EVALUATION ADULT - NSANTHPMHFT_GEN_ALL_CORE
72M somewhat poor historian PMH Afib (not on AC), HTN, GERD, prostate cancer s/p brachytherapy, neurogenic bladder managed with chronic indwelling portillo admitted to medicine for UTI, ANIA. Urology consulted for moderate bilateral hydronephrosis on US i/s/o ANIA. FeNa 5.8% - suggestive of post-obstructive ANIA. Cr 4.68 (baseline 0.92). 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

## 2023-07-05 NOTE — PROGRESS NOTE ADULT - ASSESSMENT
72M somewhat poor historian PMH Afib (not on AC), HTN, GERD, prostate cancer s/p brachytherapy, neurogenic bladder managed with chronic indwelling portillo admitted to medicine for UTI, ANIA. Urology consulted for moderate bilateral hydronephrosis on US i/s/o ANIA. FeNa 5.8% - suggestive of post-obstructive ANIA. Cr 4.68 (baseline 0.92).    Recommendations:  -Please make NPO for possible ureteral stent(s) vs. IR PCN(s) today depending on CT A/P results  -Please obtain non-con CT A/P STAT - etiology of presumed obstructive uropathy remains unclear at this time  -Continue portillo  -F/u UCx  -Broad spectrum IV abx for treatment of UTI per primary team, would cover for Pseudomonas  -Appreciate nephrology recommendations  -Appreciate excellent care per primary team  -Discussed with attending       72M somewhat poor historian PMH Afib (not on AC), HTN, GERD, prostate cancer s/p brachytherapy, neurogenic bladder managed with chronic indwelling portillo admitted to medicine for UTI, ANIA. Urology consulted for moderate bilateral hydronephrosis on US i/s/o ANIA. FeNa 5.8% - suggestive of post-obstructive ANAI. Cr 4.68 (baseline 0.92). 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    Recommendations:  -Please keep NPO for cystoscopy, bilateral ureteral stent placement today  -Continue portillo  -F/u UCx  -Broad spectrum IV abx for treatment of UTI per primary team, would cover for Pseudomonas  -Appreciate nephrology recommendations  -Appreciate excellent care per primary team  -Discussed with attending

## 2023-07-05 NOTE — PROGRESS NOTE ADULT - PROBLEM SELECTOR PLAN 1
-stable  -OOB  -IS, SCD's  -Diet: regular  -Antibx: zosyn  -I's & O's  -pain control  -IVF's  -continue portillo and slow CBI  -NPO P MN for IR PCN's bilateral  -trend Cr

## 2023-07-05 NOTE — PROGRESS NOTE ADULT - ASSESSMENT
72M somewhat poor historian PMH Afib (not on AC), HTN, CKD, prostate cancer s/p brachytherapy, chronic indwelling folly presents for fever of 101F at home and abnormal labs.

## 2023-07-05 NOTE — PHYSICAL THERAPY INITIAL EVALUATION ADULT - ADDITIONAL COMMENTS
Pt reports living in an elevator building with wife. Children (son and daughter) live close by to help with support. Pt also reports having HHA but unable to state the amount and the extent of help. Pt denies having RW or SC but does report having shower chair, grab bars, and raised toilet seat.

## 2023-07-05 NOTE — PRE-ANESTHESIA EVALUATION ADULT - NSANTHOSAYNRD_GEN_A_CORE
No. NABILA screening performed.  STOP BANG Legend: 0-2 = LOW Risk; 3-4 = INTERMEDIATE Risk; 5-8 = HIGH Risk

## 2023-07-05 NOTE — CONSULT NOTE ADULT - SUBJECTIVE AND OBJECTIVE BOX
Patient is a 73y old  Male who presents with a chief complaint of UTI iso Chronic folly (2023 08:41)      HPI:  72M somewhat poor historian PMH Afib (not on AC), HTN, GERD,  prostate cancer s/p brachytherapy, chronic indwelling portillo presents for fever of 101F at home and abnormal labs. He reports going to his GI for abdominal pain 4 days ago and was informed about Hgb 8.3 and BUN/Cr 75/3.72. He does not know his baseline kidney function but reports it being abnormal previously. Reports poor PO intake over the last week. Portillo last changed 4 weeks ago. Denies n/v, diarrhea, current abd pain.     In the ED, VSS.   Given CTX 1g, 1L NS. (2023 02:12)    PAST MEDICAL & SURGICAL HISTORY:    MEDICATIONS  (STANDING):  diltiazem    milliGRAM(s) Oral every 24 hours  finasteride 5 milliGRAM(s) Oral daily  latanoprost 0.005% Ophthalmic Solution 1 Drop(s) Both EYES at bedtime  pantoprazole    Tablet 40 milliGRAM(s) Oral before breakfast  piperacillin/tazobactam IVPB.. 4.5 Gram(s) IV Intermittent every 12 hours  polyethylene glycol 3350 17 Gram(s) Oral daily  senna 2 Tablet(s) Oral at bedtime  sodium bicarbonate 650 milliGRAM(s) Oral three times a day  tamsulosin 0.4 milliGRAM(s) Oral at bedtime    MEDICATIONS  (PRN):  acetaminophen     Tablet .. 650 milliGRAM(s) Oral every 6 hours PRN Temp greater or equal to 38C (100.4F), Mild Pain (1 - 3)  melatonin 3 milliGRAM(s) Oral at bedtime PRN Insomnia          Social History:              - single ,   ,   children          - tobacco ,   etoh ,    IVDA ,   recreational drugs       Home Living Status :  lives in an elevator accessible apartment building ,   steps to enter ,   ramp access          -  Prior Home Care Services :  none     hrs x  days/week         -  Family support :          -  Occupation :     Baseline Functional Level Prior to Admission :             - ADL's/ IADL's :  independent , requires partial assistance with          - Ambulatory status prior to admission :   walked with         FAMILY HISTORY:      CBC Full  -  ( 2023 06:07 )  WBC Count : 8.18 K/uL  RBC Count : 2.94 M/uL  Hemoglobin : 8.1 g/dL  Hematocrit : 25.9 %  Platelet Count - Automated : 276 K/uL  Mean Cell Volume : 88.1 fl  Mean Cell Hemoglobin : 27.6 pg  Mean Cell Hemoglobin Concentration : 31.3 gm/dL  Auto Neutrophil # : 6.17 K/uL  Auto Lymphocyte # : 1.10 K/uL  Auto Monocyte # : 0.83 K/uL  Auto Eosinophil # : 0.00 K/uL  Auto Basophil # : 0.05 K/uL  Auto Neutrophil % : 75.5 %  Auto Lymphocyte % : 13.4 %  Auto Monocyte % : 10.1 %  Auto Eosinophil % : 0.0 %  Auto Basophil % : 0.6 %      07-05    134<L>  |  104  |  71<H>  ----------------------------<  98  4.6   |  18<L>  |  4.68<H>    Ca    9.5      2023 06:07  Phos  3.8     07-  Mg     1.8     -    TPro  6.1  /  Alb  2.8<L>  /  TBili  0.3  /  DBili  x   /  AST  13  /  ALT  10  /  AlkPhos  72  07-05      Urinalysis Basic - ( 2023 10:11 )    Color: Yellow / Appearance: Clear / S.015 / pH: x  Gluc: x / Ketone: NEGATIVE  / Bili: Negative / Urobili: 0.2 E.U./dL   Blood: x / Protein: 30 mg/dL / Nitrite: NEGATIVE   Leuk Esterase: Large / RBC: Many /HPF / WBC Many /HPF   Sq Epi: x / Non Sq Epi: x / Bacteria: Many /HPF        Radiology :     < from: Xray Chest 1 View- PORTABLE-Routine (Xray Chest 1 View- PORTABLE-Routine .) (23 @ 00:33) >  ACC: [id number] EXAM:  XR CHEST PORTABLE ROUTINE 1V   ORDERED BY: VANESA WALKER     PROCEDURE DATE:  2023          INTERPRETATION:  Clinical history reason for exam: Fever.    Frontal chest.    No comparison.    Findings/  impression: Heart, lungs and mediastinum are unremarkable. Elevation of   the left hemidiaphragm. Gynecomastia. Left breast coarse calcification     < from: CT Abdomen and Pelvis No Cont (23 @ 09:45) >  ACC: [id number] EXAM:  CT ABDOMEN AND PELVIS   ORDERED BY: JACQUELINE CHAUDHRY     PROCEDURE DATE:  2023          INTERPRETATION:  CLINICAL INFORMATION: Chronic indwelling catheter,   possibly post renal obstruction    COMPARISON: Kidney ultrasound 2023.    CONTRAST/COMPLICATIONS:  IV Contrast: None  Oral Contrast: None  Complications: None    PROCEDURE:  CT of the Abdomen and Pelvis was performed.  Sagittal and coronal reformats were performed.    FINDINGS:  LOWER CHEST: Aortic valvular and coronary artery calcifications. Small   bilateral pleural effusions.    LIVER: Within normal limits.  BILE DUCTS: Normal caliber.  GALLBLADDER: Cholecystectomy.  SPLEEN: Within normal limits.  PANCREAS: Within normal limits.  ADRENALS: Within normal limits.  KIDNEYS/URETERS: Bilateral symmetric hydronephrosis. No stones.    BLADDER: Minimally distended with Portillo catheter balloon in bladder lumen   limiting evaluation. Small fluid and air within bladder lumen. Bladder   wall thickeningcould be due to cystitis, chronic outlet obstruction, or   combination. No stones in bladder.  REPRODUCTIVE ORGANS: Prostate brachytherapy seeds in place.    BOWEL: No bowel obstruction. Appendix is not visualized. No evidence of   inflammation in the pericecal region.  PERITONEUM: Small scattered free fluid.  VESSELS: Within normal limits.  RETROPERITONEUM/LYMPH NODES: No lymphadenopathy.  ABDOMINAL WALL: Within normal limits.  BONES: Degenerative changes.    IMPRESSION:  Symmetric bilateral hydronephrosis to level of the urinary bladder. No   stones. Bladder wall thickening could be due to cystitis, chronic outlet   obstruction, or combination.  Small bilateral pleural effusions and small scattered abdominopelvic   ascites, suggesting fluidoverload.    < end of copied text >              Review of Systems : per HPI             Vital Signs Last 24 Hrs  T(C): 36.6 (2023 10:08), Max: 36.9 (2023 21:13)  T(F): 97.8 (2023 10:08), Max: 98.4 (2023 21:13)  HR: 96 (2023 11:00) (84 - 98)  BP: 120/75 (2023 11:00) (116/73 - 130/80)  BP(mean): --  RR: 17 (2023 10:08) (17 - 18)  SpO2: 98% (2023 11:00) (96% - 98%)    Parameters below as of 2023 10:08  Patient On (Oxygen Delivery Method): room air            Physical Exam :   73 y o man lying comfortably in semi Wen's position , awake , alert , no acute complaints     Head : normocephalic , atraumatic    Eyes : PERRLA , EOMI , no nystagmus , sclera anicteric    ENT / FACE : neg nasal discharge , uvula midline , no oropharyngeal erythema / exudate    Neck : supple , negative JVD , negative carotid bruits , no thyromegaly    Chest : CTA bilaterally , neg wheeze / rhonchi / rales / crackles / egophany    Cardiovascular : regular rate and rhythm , neg murmurs / rubs / gallops    Abdomen : soft , non distended , non tender to palpation in all 4 quadrants ,  normal bowel sounds     Extremities : chronic venous stasis changes, 2 + LE edema     Neurologic Exam :    Alert and oriented  x  3    Motor Exam:          Right UE:               no focal weakness ,  > 3+/5 throughout     Left UE:                 no focal weakness ,  > 3+/5 throughout         Right LE:    no focal weakness ,  > 3+/5 throughout        Left LE:    no focal weakness ,  > 3+/5 throughout         Sensation :         intact to light touch x 4 extremities       DTR :     biceps/brachioradialis : equal                      patella/ankle : equal      Gait :  not tested          PM&R Impression :         - deconditioned    - no focal weakness        Recommendations / Plan :                1) Physical / Occupational therapy focusing on therapeutic exercises , equipment evaluation , bed mobility/transfer out of bed evaluation , progressive ambulation with assistive devices prn .    2) Current disposition plan recommendation  :      d/c home with home physical therapy

## 2023-07-05 NOTE — PROGRESS NOTE ADULT - ASSESSMENT
72 yo male with bilateral hydronephrosis, chronic portillo, ANIA- s/p cystoscopy and attempted bilateral ureteral stents

## 2023-07-05 NOTE — CONSULT NOTE ADULT - SUBJECTIVE AND OBJECTIVE BOX
HPI:  72M somewhat poor historian PMH Afib (not on AC), HTN, GERD,  prostate cancer s/p brachytherapy, chronic indwelling portillo presents for fever of 101F at home and abnormal labs. He reports going to his GI for abdominal pain 4 days ago and was informed about Hgb 8.3 and BUN/Cr 75/3.72. He does not know his baseline kidney function but reports it being abnormal previously. Reports poor PO intake over the last week. Portillo last changed 4 weeks ago. Denies n/v, diarrhea, current abd pain.     In the ED, VSS.   Given CTX 1g, 1L NS. (2023 02:12)      PAST MEDICAL & SURGICAL HISTORY:        Allergies:  No Known Allergies      Home Medications:       Hospital Medications:   MEDICATIONS  (STANDING):  diltiazem    milliGRAM(s) Oral every 24 hours  finasteride 5 milliGRAM(s) Oral daily  latanoprost 0.005% Ophthalmic Solution 1 Drop(s) Both EYES at bedtime  pantoprazole    Tablet 40 milliGRAM(s) Oral before breakfast  piperacillin/tazobactam IVPB.. 4.5 Gram(s) IV Intermittent every 12 hours  polyethylene glycol 3350 17 Gram(s) Oral daily  senna 2 Tablet(s) Oral at bedtime  sodium bicarbonate 650 milliGRAM(s) Oral three times a day  tamsulosin 0.4 milliGRAM(s) Oral at bedtime      SOCIAL HISTORY:  Denies ETOh, Smoking    Family History:  FAMILY HISTORY:        VITALS:  T(F): 97.8 (23 @ 15:25), Max: 98.4 (23 @ 21:13)  HR: 93 (23 @ 15:25)  BP: 116/73 (23 @ 15:25)  RR: 17 (23 @ 15:25)  SpO2: 98% (23 @ 15:25)  Wt(kg): --     @ 07:01  -   @ 07:00  --------------------------------------------------------  IN: 0 mL / OUT: 2550 mL / NET: -2550 mL     @ 07:01  -   @ 15:33  --------------------------------------------------------  IN: 0 mL / OUT: 150 mL / NET: -150 mL      Height (cm): 172.7 ( @ 15:00)  Weight (kg): 103.5 ( @ 15:00)  BMI (kg/m2): 34.7 ( @ 15:00)  BSA (m2): 2.16 ( @ 15:00)  CAPILLARY BLOOD GLUCOSE          Review of Systems:  Negative except as mentioned in HPI    PHYSICAL EXAM:  GENERAL: Alert, awake, oriented x3   CHEST/LUNG: Bilateral clear breath sounds  HEART: Regular rate and rhythm, no murmur, no gallops, no rub   ABDOMEN: Soft, nontender, non distended  : No flank or supra pubic tenderness.  EXTREMITIES: no pedal edema  Neurology: AAOx3, no focal neurological deficit      LABS:      134<L>  |  104  |  71<H>  ----------------------------<  98  4.6   |  18<L>  |  4.68<H>    Ca    9.5      2023 06:07  Phos  3.8     07-  Mg     1.8     -    TPro  6.1  /  Alb  2.8<L>  /  TBili  0.3  /  DBili      /  AST  13  /  ALT  10  /  AlkPhos  72  07-    Creatinine Trend: 4.68 <--, 4.30 <--, 4.25 <--                        8.1    8.18  )-----------( 276      ( 2023 06:07 )             25.9     Urine Studies:  Urinalysis Basic - ( 2023 10:11 )    Color: Yellow / Appearance: Clear / S.015 / pH:   Gluc:  / Ketone: NEGATIVE  / Bili: Negative / Urobili: 0.2 E.U./dL   Blood:  / Protein: 30 mg/dL / Nitrite: NEGATIVE   Leuk Esterase: Large / RBC: Many /HPF / WBC Many /HPF   Sq Epi:  / Non Sq Epi:  / Bacteria: Many /HPF      Creatinine, Random Urine: 25 mg/dL ( @ 03:23)  Creatinine, Random Urine: 25 mg/dL ( @ 03:23)  Protein/Creatinine Ratio Calculation: 1.7 Ratio ( @ 03:23)  Creatinine, Random Urine: 26 mg/dL ( @ 02:40)  Sodium, Random Urine: 47 mmol/L ( @ 02:40)  Osmolality, Random Urine: 206 mosm/kg ( @ 02:40)               HPI:  72M w/ HTN, chronic indwelling portillo presents for fever of 101F at home and abnormal labs. He reports going to his GI for abdominal pain 4 days ago and was informed about Hgb 8.3 and BUN/Cr 75/3.72. He does not know his baseline kidney function but reports it being abnormal previously. Reports poor PO intake over the last week. Portillo last changed 4 weeks ago. Denies n/v, diarrhea, current abd pain.   On admission, imaging revealed b/l mod hydro. Compressed bladder w/ portillo. CT also done, urology planning for intervention    PAST MEDICAL & SURGICAL HISTORY:        Allergies:  No Known Allergies      Home Medications:       Hospital Medications:   MEDICATIONS  (STANDING):  diltiazem    milliGRAM(s) Oral every 24 hours  finasteride 5 milliGRAM(s) Oral daily  latanoprost 0.005% Ophthalmic Solution 1 Drop(s) Both EYES at bedtime  pantoprazole    Tablet 40 milliGRAM(s) Oral before breakfast  piperacillin/tazobactam IVPB.. 4.5 Gram(s) IV Intermittent every 12 hours  polyethylene glycol 3350 17 Gram(s) Oral daily  senna 2 Tablet(s) Oral at bedtime  sodium bicarbonate 650 milliGRAM(s) Oral three times a day  tamsulosin 0.4 milliGRAM(s) Oral at bedtime      SOCIAL HISTORY:  Denies ETOh, Smoking    Family History:  FAMILY HISTORY:        VITALS:  T(F): 97.8 (23 @ 15:25), Max: 98.4 (23 @ 21:13)  HR: 93 (23 @ 15:25)  BP: 116/73 (23 @ 15:25)  RR: 17 (23 @ 15:25)  SpO2: 98% (23 @ 15:25)  Wt(kg): --     @ 07:  -   @ 07:00  --------------------------------------------------------  IN: 0 mL / OUT: 2550 mL / NET: -2550 mL     @ 07:01  -   @ 15:33  --------------------------------------------------------  IN: 0 mL / OUT: 150 mL / NET: -150 mL      Height (cm): 172.7 ( @ 15:00)  Weight (kg): 103.5 ( @ 15:00)  BMI (kg/m2): 34.7 ( @ 15:00)  BSA (m2): 2.16 ( @ 15:00)  CAPILLARY BLOOD GLUCOSE          Review of Systems:  Negative except as mentioned in HPI    PHYSICAL EXAM:  GENERAL: Alert, awake, NAD  CHEST/LUNG: Bilateral clear breath sounds  HEART: Regular rate and rhythm, no murmur  ABDOMEN: Soft, nontender, non distended  : Portillo in place, draining urine  EXTREMITIES: no edema  Neurology: AAOx3, no focal neurological deficit      LABS:      134<L>  |  104  |  71<H>  ----------------------------<  98  4.6   |  18<L>  |  4.68<H>    Ca    9.5      2023 06:07  Phos  3.8     07-  Mg     1.8         TPro  6.1  /  Alb  2.8<L>  /  TBili  0.3  /  DBili      /  AST  13  /  ALT  10  /  AlkPhos  72  07    Creatinine Trend: 4.68 <--, 4.30 <--, 4.25 <--                        8.1    8.18  )-----------( 276      ( 2023 06:07 )             25.9     Urine Studies:  Urinalysis Basic - ( 2023 10:11 )    Color: Yellow / Appearance: Clear / S.015 / pH:   Gluc:  / Ketone: NEGATIVE  / Bili: Negative / Urobili: 0.2 E.U./dL   Blood:  / Protein: 30 mg/dL / Nitrite: NEGATIVE   Leuk Esterase: Large / RBC: Many /HPF / WBC Many /HPF   Sq Epi:  / Non Sq Epi:  / Bacteria: Many /HPF      Creatinine, Random Urine: 25 mg/dL ( @ 03:23)  Creatinine, Random Urine: 25 mg/dL ( @ 03:23)  Protein/Creatinine Ratio Calculation: 1.7 Ratio ( 03:23)  Creatinine, Random Urine: 26 mg/dL ( @ 02:40)  Sodium, Random Urine: 47 mmol/L ( @ 02:40)  Osmolality, Random Urine: 206 mosm/kg ( @ 02:40)

## 2023-07-05 NOTE — CONSULT NOTE ADULT - ATTENDING COMMENTS
events noted, chart reviewed  72 yo man with acute rise in creat  to 4.2 in setting of obstructive uropathy- bilat hydro at level of bladder  thought to be related to ureteral strictures s/p RT  for stent placement if able, otherwise will need percutaneous nephrostomies  for OR today  keep hydrated  will trend UO. lyes, BUN/creat  volume status

## 2023-07-05 NOTE — PROGRESS NOTE ADULT - PROBLEM SELECTOR PLAN 1
baseline Cr 0.9 per pts outpatient urologist Dr Robledo but he reports having abnormal renal function in the past. Labs from last week with BUN/Cr 75/3.72. Making urine. At baseline mental status. Reports poor PO intake over the past week. Also with UTI. Perhaps pre-renal aspect. UA with protein and blood. Takes lasix 20mg qD as home med. ?CKD, renal sono showed b/l mild hydronephrosis. dsDNA neg, Hep panel neg, CTAP showed b/l hydronephrosis to level of urinary bladder. pt went to OR with urology for cystoscopy and b/l ureteral stents, repeate UA and UCx sent.    - f/u urine studies Uosm, Liza, Uurea, UCr  - urine ACR  - will order GN workup , ALTON, ANCA w/reflex, C3, C4, COLLIN, SPEP, free light chains, PLA2R, and anti-GBM   - strict Is and Os  - f/c renal recs baseline Cr 0.9 per pts outpatient urologist Dr Robledo but he reports having abnormal renal function in the past. Labs from last week with BUN/Cr 75/3.72. Making urine. At baseline mental status. Reports poor PO intake over the past week. Also with UTI. Perhaps pre-renal aspect. UA with protein and blood. Takes lasix 20mg qD as home med. ?CKD, renal sono showed b/l mild hydronephrosis. dsDNA neg, Hep panel neg, CTAP showed b/l hydronephrosis to level of urinary bladder. pt went to OR with urology for cystoscopy and b/l ureteral stents however they were not able toplace them, we will reach out to IR for percutaneous nephrostomy chloe., repeate UA and UCx sent.    - f/u urine studies Uosm, Liza, Uurea, UCr  - urine ACR  - will order GN workup , ALTON, ANCA w/reflex, C3, C4, COLLIN, SPEP, free light chains, PLA2R, and anti-GBM   - strict Is and Os  - f/c renal recs

## 2023-07-05 NOTE — PRE-ANESTHESIA EVALUATION ADULT - NSANTHPEFT_GEN_ALL_CORE
GEN: A&Ox3, NAD  HEENT: no abnormal facies, neck unremarkable  CV: RRR, no M/R/G  PULM: CTABL, breathing comfortably on RA  NEURO: moves all 4 extremities, no gross deficit GEN: A&Ox3, NAD, overweight  HEENT: no abnormal facies, neck unremarkable  CV: RRR, no M/R/G  PULM: CTABL, breathing comfortably on RA  NEURO: moves all 4 extremities, no gross deficit

## 2023-07-06 LAB
ALBUMIN SERPL ELPH-MCNC: 2.5 G/DL — LOW (ref 3.3–5)
ALBUMIN SERPL ELPH-MCNC: 2.5 G/DL — LOW (ref 3.3–5)
ALP SERPL-CCNC: 62 U/L — SIGNIFICANT CHANGE UP (ref 40–120)
ALP SERPL-CCNC: 67 U/L — SIGNIFICANT CHANGE UP (ref 40–120)
ALT FLD-CCNC: 8 U/L — LOW (ref 10–45)
ALT FLD-CCNC: 8 U/L — LOW (ref 10–45)
ANA TITR SER: NEGATIVE — SIGNIFICANT CHANGE UP
ANION GAP SERPL CALC-SCNC: 13 MMOL/L — SIGNIFICANT CHANGE UP (ref 5–17)
ANION GAP SERPL CALC-SCNC: 13 MMOL/L — SIGNIFICANT CHANGE UP (ref 5–17)
APTT BLD: 31.1 SEC — SIGNIFICANT CHANGE UP (ref 27.5–35.5)
AST SERPL-CCNC: 11 U/L — SIGNIFICANT CHANGE UP (ref 10–40)
AST SERPL-CCNC: 11 U/L — SIGNIFICANT CHANGE UP (ref 10–40)
BASOPHILS # BLD AUTO: 0.02 K/UL — SIGNIFICANT CHANGE UP (ref 0–0.2)
BASOPHILS # BLD AUTO: 0.02 K/UL — SIGNIFICANT CHANGE UP (ref 0–0.2)
BASOPHILS NFR BLD AUTO: 0.1 % — SIGNIFICANT CHANGE UP (ref 0–2)
BASOPHILS NFR BLD AUTO: 0.2 % — SIGNIFICANT CHANGE UP (ref 0–2)
BILIRUB SERPL-MCNC: 0.2 MG/DL — SIGNIFICANT CHANGE UP (ref 0.2–1.2)
BILIRUB SERPL-MCNC: 0.2 MG/DL — SIGNIFICANT CHANGE UP (ref 0.2–1.2)
BUN SERPL-MCNC: 74 MG/DL — HIGH (ref 7–23)
BUN SERPL-MCNC: 76 MG/DL — HIGH (ref 7–23)
CALCIUM SERPL-MCNC: 8.9 MG/DL — SIGNIFICANT CHANGE UP (ref 8.4–10.5)
CALCIUM SERPL-MCNC: 9.4 MG/DL — SIGNIFICANT CHANGE UP (ref 8.4–10.5)
CHLORIDE SERPL-SCNC: 102 MMOL/L — SIGNIFICANT CHANGE UP (ref 96–108)
CHLORIDE SERPL-SCNC: 104 MMOL/L — SIGNIFICANT CHANGE UP (ref 96–108)
CO2 SERPL-SCNC: 17 MMOL/L — LOW (ref 22–31)
CO2 SERPL-SCNC: 18 MMOL/L — LOW (ref 22–31)
CREAT SERPL-MCNC: 5.07 MG/DL — HIGH (ref 0.5–1.3)
CREAT SERPL-MCNC: 5.33 MG/DL — HIGH (ref 0.5–1.3)
EGFR: 11 ML/MIN/1.73M2 — LOW
EGFR: 11 ML/MIN/1.73M2 — LOW
EOSINOPHIL # BLD AUTO: 0 K/UL — SIGNIFICANT CHANGE UP (ref 0–0.5)
EOSINOPHIL # BLD AUTO: 0 K/UL — SIGNIFICANT CHANGE UP (ref 0–0.5)
EOSINOPHIL NFR BLD AUTO: 0 % — SIGNIFICANT CHANGE UP (ref 0–6)
EOSINOPHIL NFR BLD AUTO: 0 % — SIGNIFICANT CHANGE UP (ref 0–6)
GBM IGG SER-ACNC: <0.2 — SIGNIFICANT CHANGE UP (ref 0–0.9)
GLUCOSE BLDC GLUCOMTR-MCNC: 123 MG/DL — HIGH (ref 70–99)
GLUCOSE BLDC GLUCOMTR-MCNC: 163 MG/DL — HIGH (ref 70–99)
GLUCOSE BLDC GLUCOMTR-MCNC: 183 MG/DL — HIGH (ref 70–99)
GLUCOSE SERPL-MCNC: 155 MG/DL — HIGH (ref 70–99)
GLUCOSE SERPL-MCNC: 264 MG/DL — HIGH (ref 70–99)
HCT VFR BLD CALC: 30.1 % — LOW (ref 39–50)
HCT VFR BLD CALC: 31.2 % — LOW (ref 39–50)
HGB BLD-MCNC: 9.3 G/DL — LOW (ref 13–17)
HGB BLD-MCNC: 9.5 G/DL — LOW (ref 13–17)
IMM GRANULOCYTES NFR BLD AUTO: 0.6 % — SIGNIFICANT CHANGE UP (ref 0–0.9)
IMM GRANULOCYTES NFR BLD AUTO: 0.8 % — SIGNIFICANT CHANGE UP (ref 0–0.9)
INR BLD: 1.28 — HIGH (ref 0.88–1.16)
LYMPHOCYTES # BLD AUTO: 0.55 K/UL — LOW (ref 1–3.3)
LYMPHOCYTES # BLD AUTO: 0.86 K/UL — LOW (ref 1–3.3)
LYMPHOCYTES # BLD AUTO: 5.1 % — LOW (ref 13–44)
LYMPHOCYTES # BLD AUTO: 5.1 % — LOW (ref 13–44)
MAGNESIUM SERPL-MCNC: 1.8 MG/DL — SIGNIFICANT CHANGE UP (ref 1.6–2.6)
MAGNESIUM SERPL-MCNC: 1.8 MG/DL — SIGNIFICANT CHANGE UP (ref 1.6–2.6)
MCHC RBC-ENTMCNC: 27.2 PG — SIGNIFICANT CHANGE UP (ref 27–34)
MCHC RBC-ENTMCNC: 27.5 PG — SIGNIFICANT CHANGE UP (ref 27–34)
MCHC RBC-ENTMCNC: 30.4 GM/DL — LOW (ref 32–36)
MCHC RBC-ENTMCNC: 30.9 GM/DL — LOW (ref 32–36)
MCV RBC AUTO: 89.1 FL — SIGNIFICANT CHANGE UP (ref 80–100)
MCV RBC AUTO: 89.4 FL — SIGNIFICANT CHANGE UP (ref 80–100)
MONOCYTES # BLD AUTO: 0.24 K/UL — SIGNIFICANT CHANGE UP (ref 0–0.9)
MONOCYTES # BLD AUTO: 0.91 K/UL — HIGH (ref 0–0.9)
MONOCYTES NFR BLD AUTO: 2.2 % — SIGNIFICANT CHANGE UP (ref 2–14)
MONOCYTES NFR BLD AUTO: 5.4 % — SIGNIFICANT CHANGE UP (ref 2–14)
NEUTROPHILS # BLD AUTO: 15.06 K/UL — HIGH (ref 1.8–7.4)
NEUTROPHILS # BLD AUTO: 9.9 K/UL — HIGH (ref 1.8–7.4)
NEUTROPHILS NFR BLD AUTO: 88.6 % — HIGH (ref 43–77)
NEUTROPHILS NFR BLD AUTO: 91.9 % — HIGH (ref 43–77)
NRBC # BLD: 0 /100 WBCS — SIGNIFICANT CHANGE UP (ref 0–0)
NRBC # BLD: 0 /100 WBCS — SIGNIFICANT CHANGE UP (ref 0–0)
PHOSPHATE SERPL-MCNC: 4.9 MG/DL — HIGH (ref 2.5–4.5)
PHOSPHATE SERPL-MCNC: 5.2 MG/DL — HIGH (ref 2.5–4.5)
PLATELET # BLD AUTO: 299 K/UL — SIGNIFICANT CHANGE UP (ref 150–400)
PLATELET # BLD AUTO: 321 K/UL — SIGNIFICANT CHANGE UP (ref 150–400)
POTASSIUM SERPL-MCNC: 4.9 MMOL/L — SIGNIFICANT CHANGE UP (ref 3.5–5.3)
POTASSIUM SERPL-MCNC: 5 MMOL/L — SIGNIFICANT CHANGE UP (ref 3.5–5.3)
POTASSIUM SERPL-SCNC: 4.9 MMOL/L — SIGNIFICANT CHANGE UP (ref 3.5–5.3)
POTASSIUM SERPL-SCNC: 5 MMOL/L — SIGNIFICANT CHANGE UP (ref 3.5–5.3)
PROT SERPL-MCNC: 5.8 G/DL — LOW (ref 6–8.3)
PROT SERPL-MCNC: 5.9 G/DL — LOW (ref 6–8.3)
PROTHROM AB SERPL-ACNC: 15.3 SEC — HIGH (ref 10.5–13.4)
RBC # BLD: 3.38 M/UL — LOW (ref 4.2–5.8)
RBC # BLD: 3.49 M/UL — LOW (ref 4.2–5.8)
RBC # FLD: 14.7 % — HIGH (ref 10.3–14.5)
RBC # FLD: 14.9 % — HIGH (ref 10.3–14.5)
SODIUM SERPL-SCNC: 132 MMOL/L — LOW (ref 135–145)
SODIUM SERPL-SCNC: 135 MMOL/L — SIGNIFICANT CHANGE UP (ref 135–145)
WBC # BLD: 10.77 K/UL — HIGH (ref 3.8–10.5)
WBC # BLD: 16.98 K/UL — HIGH (ref 3.8–10.5)
WBC # FLD AUTO: 10.77 K/UL — HIGH (ref 3.8–10.5)
WBC # FLD AUTO: 16.98 K/UL — HIGH (ref 3.8–10.5)

## 2023-07-06 PROCEDURE — 99233 SBSQ HOSP IP/OBS HIGH 50: CPT

## 2023-07-06 PROCEDURE — 50432 PLMT NEPHROSTOMY CATHETER: CPT | Mod: 50

## 2023-07-06 RX ADMIN — Medication 180 MILLIGRAM(S): at 06:19

## 2023-07-06 RX ADMIN — TAMSULOSIN HYDROCHLORIDE 0.4 MILLIGRAM(S): 0.4 CAPSULE ORAL at 21:24

## 2023-07-06 RX ADMIN — PIPERACILLIN AND TAZOBACTAM 25 GRAM(S): 4; .5 INJECTION, POWDER, LYOPHILIZED, FOR SOLUTION INTRAVENOUS at 21:23

## 2023-07-06 RX ADMIN — PANTOPRAZOLE SODIUM 40 MILLIGRAM(S): 20 TABLET, DELAYED RELEASE ORAL at 06:20

## 2023-07-06 RX ADMIN — POLYETHYLENE GLYCOL 3350 17 GRAM(S): 17 POWDER, FOR SOLUTION ORAL at 13:50

## 2023-07-06 RX ADMIN — PIPERACILLIN AND TAZOBACTAM 25 GRAM(S): 4; .5 INJECTION, POWDER, LYOPHILIZED, FOR SOLUTION INTRAVENOUS at 03:02

## 2023-07-06 RX ADMIN — PIPERACILLIN AND TAZOBACTAM 25 GRAM(S): 4; .5 INJECTION, POWDER, LYOPHILIZED, FOR SOLUTION INTRAVENOUS at 13:51

## 2023-07-06 RX ADMIN — SENNA PLUS 2 TABLET(S): 8.6 TABLET ORAL at 21:24

## 2023-07-06 RX ADMIN — LATANOPROST 1 DROP(S): 0.05 SOLUTION/ DROPS OPHTHALMIC; TOPICAL at 21:24

## 2023-07-06 RX ADMIN — FINASTERIDE 5 MILLIGRAM(S): 5 TABLET, FILM COATED ORAL at 13:49

## 2023-07-06 NOTE — PROGRESS NOTE ADULT - ASSESSMENT
72M somewhat poor historian PMH Afib (not on AC), HTN, GERD, prostate cancer s/p brachytherapy, neurogenic bladder managed with chronic indwelling portillo admitted to medicine for UTI, ANIA. Urology consulted for moderate bilateral hydronephrosis on US i/s/o ANIA. FeNa 5.8% - suggestive of post-obstructive ANIA. Cr 4.68 (baseline 0.92). 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 S/p cystoscopy, attempted bilateral ureteral stent placement 7/5 however was unsuccessful.    Recommendations:  -Please keep NPO for b/l PCN placement by IR  -Continue portillo  -F/u UCx  -Broad spectrum IV abx for treatment of UTI per primary team, would cover for Pseudomonas  -Appreciate nephrology recommendations  -Appreciate excellent care per primary team  -Discussed with attending 72M somewhat poor historian PMH Afib (not on AC), HTN, GERD, prostate cancer s/p brachytherapy, neurogenic bladder managed with chronic indwelling portillo admitted to medicine for UTI, ANIA. Urology consulted for moderate bilateral hydronephrosis on US i/s/o ANIA. FeNa 5.8% - suggestive of post-obstructive ANIA. Cr 4.68 (baseline 0.92). 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 S/p cystoscopy, attempted bilateral ureteral stent placement 7/5 however was unsuccessful.    Recommendations:  -Please keep NPO for b/l PCN placement by IR  -Please obtain PSA  -Continue portillo  -F/u UCx  -Broad spectrum IV abx for treatment of UTI per primary team, would cover for Pseudomonas  -Appreciate nephrology recommendations  -Appreciate excellent care per primary team  -Discussed with attending

## 2023-07-06 NOTE — PROGRESS NOTE ADULT - ATTENDING COMMENTS
74 yo man with acute rise in creat  to 4.2 in setting of obstructive uropathy- bilat hydro at level of bladder  thought to be related to ureteral strictures s/p RT   unable to stent from below yesterday, now s/p bilateral PCN by IR done earlier this AM  good UO  uptick in creat not unexpected  needs IVF and will follow trend now that he is decompressed

## 2023-07-06 NOTE — PROGRESS NOTE ADULT - ASSESSMENT
72 yo M w/ no known renal disease, admitted for ANIA Cr 4.2 with workup revealing b/l hydro. Pt had presented to ED for fever, found to have UTI.    #Non-oliguric ANIA  BCr not known, but reportedly normal, will confirm. On admission Cr 4.2-->5.33 today  UA w/ hematuria, proteinuria (UPCR 1.7)  Liza 47  US w/ bilateral mod hydro, Rt 14.3cm, Lt 14.1cm, b/l increased echogenicity, decompressed bladder  Etiology likely post-renal obstruction. Given has had decreased PO intake and slight improvement in BUN after 1L fluid bolus, may have component of pre-renal     Recommend:  s/p b/l PCN today by IR. Anticipate improvement in Cr and renal function post PCN  Lytes and vol status stable, no indication of HD  Monitor UO  Avoid nephrotoxic meds, ACEi/ARBs, NSAIDs. Dose meds for eGFR<15  BMP daily

## 2023-07-06 NOTE — PROCEDURE NOTE - NSINFORMCONSENT_GEN_A_CORE
by christen Peñaloza/Benefits, risks, and possible complications of procedure explained to patient/caregiver who verbalized understanding and gave written consent.

## 2023-07-06 NOTE — PROGRESS NOTE ADULT - PROBLEM SELECTOR PLAN 1
baseline Cr 0.9 per pts outpatient urologist Dr Robledo but he reports having abnormal renal function in the past. Labs from last week with BUN/Cr 75/3.72. Making urine. At baseline mental status. Reports poor PO intake over the past week. Also with UTI. Perhaps pre-renal aspect. UA with protein and blood. Takes lasix 20mg qD as home med. ?CKD, hep pane was negative. renal sono showed b/l mild hydronephrosis. IR placed b/l percutaneous nephrostomy tubes today   - cont to f/u urine studies Uosm, Liza, Uurea, UCr  - urine ACR  - will order GN workup, dsDNA, ALTON, ANCA w/reflex, C3, C4, COLLIN, SPEP, free light chains, PLA2R, and anti-GBM   - strict Is and Os  - f/c renal recs

## 2023-07-06 NOTE — PROGRESS NOTE ADULT - ATTENDING COMMENTS
72M somewhat poor historian PMH Afib (not on AC), HTN, , ?CKD, GERD, prostate cancer s/p brachytherapy, chronic indwelling portillo presents for fever found to have a UTI as well as ANIA with creatinine of 4.3 with unknown baseline.     #UTI   -Will continue zosyn as pt has hx of pseudomonas in the past- however no symptoms of UTI will likely dc abx after 5 days   -Renal US:  Bilateral renal cysts and parenchymal disease.  Moderate bilateral hydronephrosis.  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  -UA contaminated --- Will f/u urine cx cw     #non oliguric ANIA  #low serum bicarb   #B/l moderate hydronephrosis  - baseline creatinine 0.92 from PCP Dr. Bush [phone]    - Urology took pt to OR for cystoscopy and bilateral ureteral stent placement 7/5-  unsuccessful and recommended IR for pcn   - cr 4.6-->   5.6  today   - s/p b/l PCN by IR 7/6  - continue to monitor   - will hold bicarb   - fu renal recs     #Chronic indwelling portillo   #Prostate cancer s/p brachytherapy  - Continue tamsulosin and finasteride     #Afib not on AC 2/2 hx of bleeding   - Continue diltiazem     #DISPO  - PT evaluation

## 2023-07-06 NOTE — CONSULT NOTE ADULT - SUBJECTIVE AND OBJECTIVE BOX
Clinical History: ANIA UTI    Handoff    MEWS Score    Bilateral hydronephrosis    Bilateral hydronephrosis    ANIA (acute kidney injury)    ANIA (acute kidney injury)    Acute UTI    Anemia    HTN (hypertension)    Prophylactic measure    Afib    History of BPH    Metabolic acidosis    Prostate cancer    Neurogenic bladder    Bilateral hydronephrosis    Cystoscopy    HEMATURIA    Urinary tract infection    SysAdmin_VisitLink        Meds:acetaminophen     Tablet .. 650 milliGRAM(s) Oral every 6 hours PRN  diltiazem    milliGRAM(s) Oral every 24 hours  finasteride 5 milliGRAM(s) Oral daily  latanoprost 0.005% Ophthalmic Solution 1 Drop(s) Both EYES at bedtime  melatonin 3 milliGRAM(s) Oral at bedtime PRN  oxybutynin 5 milliGRAM(s) Oral every 8 hours PRN  pantoprazole    Tablet 40 milliGRAM(s) Oral before breakfast  piperacillin/tazobactam IVPB.. 4.5 Gram(s) IV Intermittent every 8 hours  polyethylene glycol 3350 17 Gram(s) Oral daily  senna 2 Tablet(s) Oral at bedtime  tamsulosin 0.4 milliGRAM(s) Oral at bedtime      Allergies:No Known Allergies        Labs:                           8.1    8.18  )-----------( 276      ( 05 Jul 2023 06:07 )             25.9       07-05    134<L>  |  104  |  71<H>  ----------------------------<  98  4.6   |  18<L>  |  4.68<H>    Ca    9.5      05 Jul 2023 06:07  Phos  3.8     07-05  Mg     1.8     07-05    TPro  6.1  /  Alb  2.8<L>  /  TBili  0.3  /  DBili  x   /  AST  13  /  ALT  10  /  AlkPhos  72  07-05          Imaging Findings:  ACC: [id number] EXAM: CT ABDOMEN AND PELVIS ORDERED BY: JACQUELINE CHAUDHRY    PROCEDURE DATE: 07/05/2023  IMPRESSION:  Symmetric bilateral hydronephrosis to level of the urinary bladder. No stones. Bladder wall thickening could be due to cystitis, chronic outlet obstruction, or combination.  Small bilateral pleural effusions and small scattered abdominopelvic ascites, suggesting fluid overload.

## 2023-07-06 NOTE — PROGRESS NOTE ADULT - PROBLEM SELECTOR PLAN 5
Has hx of AFib but not on AC 2/2 prior bleed. Has diltiazem 180mg qD and toprol 100mg BID with him. Spoke with PCP and he said pt should be on his home Eliquis  -holding home eliquis postop   - c/w home diltiazem 180mg qD

## 2023-07-07 ENCOUNTER — TRANSCRIPTION ENCOUNTER (OUTPATIENT)
Age: 73
End: 2023-07-07

## 2023-07-07 LAB
ALBUMIN SERPL ELPH-MCNC: 2.9 G/DL — LOW (ref 3.3–5)
ALP SERPL-CCNC: 71 U/L — SIGNIFICANT CHANGE UP (ref 40–120)
ALT FLD-CCNC: 10 U/L — SIGNIFICANT CHANGE UP (ref 10–45)
ANION GAP SERPL CALC-SCNC: 14 MMOL/L — SIGNIFICANT CHANGE UP (ref 5–17)
ANION GAP SERPL CALC-SCNC: 8 MMOL/L — SIGNIFICANT CHANGE UP (ref 5–17)
AST SERPL-CCNC: 13 U/L — SIGNIFICANT CHANGE UP (ref 10–40)
BASOPHILS # BLD AUTO: 0.03 K/UL — SIGNIFICANT CHANGE UP (ref 0–0.2)
BASOPHILS NFR BLD AUTO: 0.2 % — SIGNIFICANT CHANGE UP (ref 0–2)
BILIRUB SERPL-MCNC: 0.2 MG/DL — SIGNIFICANT CHANGE UP (ref 0.2–1.2)
BUN SERPL-MCNC: 69 MG/DL — HIGH (ref 7–23)
BUN SERPL-MCNC: 69 MG/DL — HIGH (ref 7–23)
CALCIUM SERPL-MCNC: 9.5 MG/DL — SIGNIFICANT CHANGE UP (ref 8.4–10.5)
CALCIUM SERPL-MCNC: 9.9 MG/DL — SIGNIFICANT CHANGE UP (ref 8.4–10.5)
CHLORIDE SERPL-SCNC: 108 MMOL/L — SIGNIFICANT CHANGE UP (ref 96–108)
CHLORIDE SERPL-SCNC: 110 MMOL/L — HIGH (ref 96–108)
CO2 SERPL-SCNC: 19 MMOL/L — LOW (ref 22–31)
CO2 SERPL-SCNC: 20 MMOL/L — LOW (ref 22–31)
CREAT SERPL-MCNC: 4.37 MG/DL — HIGH (ref 0.5–1.3)
CREAT SERPL-MCNC: 4.66 MG/DL — HIGH (ref 0.5–1.3)
EGFR: 12 ML/MIN/1.73M2 — LOW
EGFR: 13 ML/MIN/1.73M2 — LOW
EOSINOPHIL # BLD AUTO: 0 K/UL — SIGNIFICANT CHANGE UP (ref 0–0.5)
EOSINOPHIL NFR BLD AUTO: 0 % — SIGNIFICANT CHANGE UP (ref 0–6)
GLUCOSE BLDC GLUCOMTR-MCNC: 104 MG/DL — HIGH (ref 70–99)
GLUCOSE BLDC GLUCOMTR-MCNC: 128 MG/DL — HIGH (ref 70–99)
GLUCOSE BLDC GLUCOMTR-MCNC: 154 MG/DL — HIGH (ref 70–99)
GLUCOSE BLDC GLUCOMTR-MCNC: 94 MG/DL — SIGNIFICANT CHANGE UP (ref 70–99)
GLUCOSE SERPL-MCNC: 131 MG/DL — HIGH (ref 70–99)
GLUCOSE SERPL-MCNC: 135 MG/DL — HIGH (ref 70–99)
HCT VFR BLD CALC: 30.2 % — LOW (ref 39–50)
HGB BLD-MCNC: 9.4 G/DL — LOW (ref 13–17)
IMM GRANULOCYTES NFR BLD AUTO: 0.4 % — SIGNIFICANT CHANGE UP (ref 0–0.9)
LYMPHOCYTES # BLD AUTO: 1.09 K/UL — SIGNIFICANT CHANGE UP (ref 1–3.3)
LYMPHOCYTES # BLD AUTO: 6.7 % — LOW (ref 13–44)
MAGNESIUM SERPL-MCNC: 1.8 MG/DL — SIGNIFICANT CHANGE UP (ref 1.6–2.6)
MCHC RBC-ENTMCNC: 28 PG — SIGNIFICANT CHANGE UP (ref 27–34)
MCHC RBC-ENTMCNC: 31.1 GM/DL — LOW (ref 32–36)
MCV RBC AUTO: 89.9 FL — SIGNIFICANT CHANGE UP (ref 80–100)
MONOCYTES # BLD AUTO: 1.24 K/UL — HIGH (ref 0–0.9)
MONOCYTES NFR BLD AUTO: 7.6 % — SIGNIFICANT CHANGE UP (ref 2–14)
NEUTROPHILS # BLD AUTO: 13.96 K/UL — HIGH (ref 1.8–7.4)
NEUTROPHILS NFR BLD AUTO: 85.1 % — HIGH (ref 43–77)
NRBC # BLD: 0 /100 WBCS — SIGNIFICANT CHANGE UP (ref 0–0)
PHOSPHATE SERPL-MCNC: 3.8 MG/DL — SIGNIFICANT CHANGE UP (ref 2.5–4.5)
PHOSPHOLIPASE A2 RECEPTOR ELISA: <1.8 RU/ML — SIGNIFICANT CHANGE UP (ref 0–19.9)
PLATELET # BLD AUTO: 311 K/UL — SIGNIFICANT CHANGE UP (ref 150–400)
POTASSIUM SERPL-MCNC: 4.4 MMOL/L — SIGNIFICANT CHANGE UP (ref 3.5–5.3)
POTASSIUM SERPL-MCNC: 5.2 MMOL/L — SIGNIFICANT CHANGE UP (ref 3.5–5.3)
POTASSIUM SERPL-SCNC: 4.4 MMOL/L — SIGNIFICANT CHANGE UP (ref 3.5–5.3)
POTASSIUM SERPL-SCNC: 5.2 MMOL/L — SIGNIFICANT CHANGE UP (ref 3.5–5.3)
PROT SERPL-MCNC: 6.5 G/DL — SIGNIFICANT CHANGE UP (ref 6–8.3)
RBC # BLD: 3.36 M/UL — LOW (ref 4.2–5.8)
RBC # FLD: 14.9 % — HIGH (ref 10.3–14.5)
SODIUM SERPL-SCNC: 138 MMOL/L — SIGNIFICANT CHANGE UP (ref 135–145)
SODIUM SERPL-SCNC: 141 MMOL/L — SIGNIFICANT CHANGE UP (ref 135–145)
WBC # BLD: 16.38 K/UL — HIGH (ref 3.8–10.5)
WBC # FLD AUTO: 16.38 K/UL — HIGH (ref 3.8–10.5)

## 2023-07-07 PROCEDURE — 93306 TTE W/DOPPLER COMPLETE: CPT | Mod: 26

## 2023-07-07 PROCEDURE — 99232 SBSQ HOSP IP/OBS MODERATE 35: CPT

## 2023-07-07 PROCEDURE — 99233 SBSQ HOSP IP/OBS HIGH 50: CPT | Mod: GC

## 2023-07-07 PROCEDURE — 99222 1ST HOSP IP/OBS MODERATE 55: CPT

## 2023-07-07 RX ORDER — SODIUM CHLORIDE 9 MG/ML
1000 INJECTION, SOLUTION INTRAVENOUS
Refills: 0 | Status: DISCONTINUED | OUTPATIENT
Start: 2023-07-07 | End: 2023-07-08

## 2023-07-07 RX ORDER — SODIUM CHLORIDE 9 MG/ML
1000 INJECTION, SOLUTION INTRAVENOUS
Refills: 0 | Status: DISCONTINUED | OUTPATIENT
Start: 2023-07-07 | End: 2023-07-07

## 2023-07-07 RX ORDER — LINEZOLID 600 MG/300ML
600 INJECTION, SOLUTION INTRAVENOUS EVERY 12 HOURS
Refills: 0 | Status: DISCONTINUED | OUTPATIENT
Start: 2023-07-07 | End: 2023-07-10

## 2023-07-07 RX ORDER — APIXABAN 2.5 MG/1
5 TABLET, FILM COATED ORAL EVERY 12 HOURS
Refills: 0 | Status: DISCONTINUED | OUTPATIENT
Start: 2023-07-07 | End: 2023-07-10

## 2023-07-07 RX ADMIN — Medication 650 MILLIGRAM(S): at 11:25

## 2023-07-07 RX ADMIN — Medication 650 MILLIGRAM(S): at 06:55

## 2023-07-07 RX ADMIN — PIPERACILLIN AND TAZOBACTAM 25 GRAM(S): 4; .5 INJECTION, POWDER, LYOPHILIZED, FOR SOLUTION INTRAVENOUS at 05:55

## 2023-07-07 RX ADMIN — Medication 180 MILLIGRAM(S): at 05:55

## 2023-07-07 RX ADMIN — LINEZOLID 600 MILLIGRAM(S): 600 INJECTION, SOLUTION INTRAVENOUS at 18:20

## 2023-07-07 RX ADMIN — Medication 3 MILLIGRAM(S): at 22:16

## 2023-07-07 RX ADMIN — SODIUM CHLORIDE 60 MILLILITER(S): 9 INJECTION, SOLUTION INTRAVENOUS at 10:33

## 2023-07-07 RX ADMIN — PIPERACILLIN AND TAZOBACTAM 25 GRAM(S): 4; .5 INJECTION, POWDER, LYOPHILIZED, FOR SOLUTION INTRAVENOUS at 13:29

## 2023-07-07 RX ADMIN — SODIUM CHLORIDE 60 MILLILITER(S): 9 INJECTION, SOLUTION INTRAVENOUS at 18:20

## 2023-07-07 RX ADMIN — APIXABAN 5 MILLIGRAM(S): 2.5 TABLET, FILM COATED ORAL at 19:23

## 2023-07-07 RX ADMIN — Medication 650 MILLIGRAM(S): at 05:55

## 2023-07-07 RX ADMIN — TAMSULOSIN HYDROCHLORIDE 0.4 MILLIGRAM(S): 0.4 CAPSULE ORAL at 22:10

## 2023-07-07 RX ADMIN — Medication 650 MILLIGRAM(S): at 12:25

## 2023-07-07 RX ADMIN — LATANOPROST 1 DROP(S): 0.05 SOLUTION/ DROPS OPHTHALMIC; TOPICAL at 22:23

## 2023-07-07 RX ADMIN — POLYETHYLENE GLYCOL 3350 17 GRAM(S): 17 POWDER, FOR SOLUTION ORAL at 11:26

## 2023-07-07 RX ADMIN — FINASTERIDE 5 MILLIGRAM(S): 5 TABLET, FILM COATED ORAL at 11:25

## 2023-07-07 NOTE — DISCHARGE NOTE PROVIDER - PROVIDER TOKENS
PROVIDER:[TOKEN:[[id number]]],PROVIDER:[TOKEN:[[id number]]] PROVIDER:[TOKEN:[[id number]]],PROVIDER:[TOKEN:[[id number]],SCHEDULEDAPPT:[07/11/2023],SCHEDULEDAPPTTIME:[01:10 PM]] PROVIDER:[TOKEN:[[id number]],SCHEDULEDAPPT:[07/11/2023],SCHEDULEDAPPTTIME:[01:10 PM]],FREE:[LAST:[Bush],FIRST:[Tequila],PHONE:[phone]],FAX:[(   )    -],ADDRESS:[[place] [address]],SCHEDULEDAPPT:[07/14/2023],SCHEDULEDAPPTTIME:[12:00 PM],ESTABLISHEDPATIENT:[T]],PROVIDER:[TOKEN:[[id number]]] PROVIDER:[TOKEN:[[id number]],SCHEDULEDAPPT:[07/11/2023],SCHEDULEDAPPTTIME:[01:10 PM]],PROVIDER:[TOKEN:[[id number]],SCHEDULEDAPPT:[08/15/2023],SCHEDULEDAPPTTIME:[04:30 PM]],FREE:[LAST:[Bush],FIRST:[Kyi],PHONE:[phone]:[(   )    -],ADDRESS:[[place] [address]],SCHEDULEDAPPT:[07/14/2023],SCHEDULEDAPPTTIME:[12:00 PM],ESTABLISHEDPATIENT:[T]]

## 2023-07-07 NOTE — DISCHARGE NOTE PROVIDER - HOSPITAL COURSE
#Discharge: do not delete    ANGELINA DODGE is a 73y Male with a past medical history of  HTN, GERD, CKD, GERD, prostate cancer s/p brachytherapy and chronic indwelling portillo.     Presented with abdominal pain, found to have ANIA and UTI in the setting of bilateral ureteral obstruction.    Problem List/Main Diagnoses (system-based):     # ANIA on CKD  On admission patient had a creatinine of 4.25, and found to have bilateral ureteral obstruction. Interventional radiology placed bilateral percutaneous nephrostomy tubes which are draining well. On discharge the patients creatinine was 2.09   -Patient family (wife/daughter) should continue to change nephrostomy tube and portillo as per IR and urology recommendations.  -f/u with Dr. Robledo (Urologist)  07/11/2023 01:10 PM  -f/u with Dr. Panda (Nephrology) 08/15/2023 04:30 PM  -f/u with Dr. Bush (PCP) 07/14/2023 12:00 PM    # UTI  Patient had urine cultures positive for MRSA. He was given Linezolid 600mg q12 for 6 days, last day of antibiotics is 7/13/23.   -c/w Linezolid through 7/13/23    -f/u with Dr. Robledo (Urologist)  07/11/2023 01:10 PM  -f/u with Dr. Panda (Nephrology) 08/15/2023 04:30 PM    #Anemia  (RESOLVED)  On admission pt had     Patient was discharged to: (home/[facility]/acute rehab/hospice, etc. and w/ home health/home PT/RN/home O2)    New medications:   Changes to old medications:  Medications that were stopped:    Items to follow up as outpatient:    Physical exam at the time of discharge:       LABS & STUDIES:   #Discharge: do not delete    ANGELINA DODGE is a 73y Male with a past medical history of  HTN, GERD, CKD, GERD, prostate cancer s/p brachytherapy and chronic indwelling portillo.     Presented with abdominal pain, found to have ANIA and UTI in the setting of bilateral ureteral obstruction.    Problem List/Main Diagnoses (system-based):     # ANIA on CKD  On admission patient had a creatinine of 4.25, and found to have bilateral ureteral obstruction. Interventional radiology placed bilateral percutaneous nephrostomy tubes which are draining well. On discharge the patients creatinine was 2.09   -Patient family (wife/daughter) should continue to change nephrostomy tube and portillo as per IR and urology recommendations.  -f/u with Dr. Robledo (Urologist)  07/11/2023 01:10 PM  -f/u with Dr. Panda (Nephrology) 08/15/2023 04:30 PM  -f/u with Dr. Bush (PCP) 07/14/2023 12:00 PM    # UTI  Patient had urine cultures positive for MRSA. He was given Linezolid 600mg q12 for 6 days, last day of antibiotics is 7/13/23.   -c/w Linezolid through 7/13/23    -f/u with Dr. Robledo (Urologist)  07/11/2023 01:10 PM  -f/u with Dr. Panda (Nephrology) 08/15/2023 04:30 PM    #Anemia  (RESOLVED)  During hospital stay patients Hgb was found to be between 7.7-9.1, possibly secondary to patients CKD. The patients Hgb remained stable in this range during his hospital stay.      -f/u with Dr. Bush (PCP) 07/14/2023 12:00 PM  -f/u CBC    # Atrial fibulation   Patient has a history of Afib, and his home Eliquis was held prior to procedure (PCN), and will be restarted on discharge.   -f/u with Dr. Bush (PCP) 07/14/2023 12:00 PM    #BPH  Patient with a history of BPH, his finasteride 5mg daily, Tamsulosin 0.4 mg at bedtime and oxybutynin 5 mg PRN was continued during his hospital stay.   -f/u with Dr. Robledo (Urologist)  07/11/2023 01:10 PM  -c/w home finasteride 5mg daily, Tamsulosin 0.4 mg at bedtime and oxybutynin 5 mg PRN    Patient was discharged to: home    New medications: Linezolid 600mg q12hrs for 6 days, to be taken though 7/13/23    Changes to old medications: No    Medications that were stopped: No     Items to follow up as outpatient:   -Nephrostomy tube and portillo changes per IR and nephrology recommendations.   -f/u with Dr. Robledo (Urologist)  07/11/2023 01:10 PM  -f/u with Dr. Panda (Nephrology) 08/15/2023 04:30 PM  -f/u with Dr. Bush (PCP) 07/14/2023 12:00 PM    Physical exam at the time of discharge:   GEN: NAD   HEENT: NC/AT  CV: RRR  PULM: CTAB  ABD: Soft, non-distended, non-tender  NEURO A/O x3, moving all extremities, Sensation intact  : b/l nephrostomy tubes in place   PSYCH: Appropriate     #Discharge: do not delete    ANGELINA DODGE is a 73y Male with a past medical history of  HTN, GERD, CKD, GERD, prostate cancer s/p brachytherapy and chronic indwelling portillo.     Presented with abdominal pain, found to have ANIA and UTI in the setting of bilateral ureteral obstruction.    Problem List/Main Diagnoses (system-based):     # ANIA on CKD  On admission patient had a creatinine of 4.25, and found to have bilateral ureteral obstruction. Interventional radiology placed bilateral percutaneous nephrostomy tubes which are draining well. On discharge the patients creatinine was 2.09   -Patient family (wife/daughter) should continue to change nephrostomy tube and portillo as per IR and urology recommendations.  -f/u with Dr. Robledo (Urologist)  07/11/2023 01:10 PM  -f/u with Dr. Panda (Nephrology) 08/15/2023 04:30 PM  -f/u with Dr. Bush (PCP) 07/14/2023 12:00 PM    # UTI  Patient had urine cultures positive for MRSA. He was given Linezolid 600mg q12 for 6 days, last day of antibiotics is 7/13/23.   -c/w Linezolid through 7/13/23    -f/u with Dr. Robledo (Urologist)  07/11/2023 01:10 PM  -f/u with Dr. Panda (Nephrology) 08/15/2023 04:30 PM    #Anemia  (RESOLVED)  During hospital stay patients Hgb was found to be between 7.7-9.1, possibly secondary to patients CKD. The patients Hgb remained stable in this range during his hospital stay.      -f/u with Dr. Bush (PCP) 07/14/2023 12:00 PM  -f/u CBC    # Atrial fibulation   Patient has a history of Afib, and his home Eliquis 5mg was held prior to procedure (PCN), and will continue to hold Eliquis on discharge.   -f/u with Dr. Torres about when to restart home Eliquis.   -f/u with Dr. Bush (PCP) 07/14/2023 12:00 PM    #BPH  Patient with a history of BPH, his finasteride 5mg daily, Tamsulosin 0.4 mg at bedtime and oxybutynin 5 mg PRN was continued during his hospital stay.   -f/u with Dr. Robledo (Urologist)  07/11/2023 01:10 PM  -c/w home finasteride 5mg daily, Tamsulosin 0.4 mg at bedtime and oxybutynin 5 mg PRN    Patient was discharged to: home    New medications: Linezolid 600mg q12hrs for 6 days, to be taken though 7/13/23    Changes to old medications: No    Medications that were stopped: No     Items to follow up as outpatient:   -Nephrostomy tube and portillo changes per IR and nephrology recommendations.   -f/u with Dr. Robledo (Urologist)  07/11/2023 01:10 PM  -f/u with Dr. Panda (Nephrology) 08/15/2023 04:30 PM  -f/u with Dr. Bush (PCP) 07/14/2023 12:00 PM    Physical exam at the time of discharge:   GEN: NAD   HEENT: NC/AT  CV: RRR  PULM: CTAB  ABD: Soft, non-distended, non-tender  NEURO A/O x3, moving all extremities, Sensation intact  : b/l nephrostomy tubes in place   PSYCH: Appropriate     #Discharge: do not delete    ANGELINA DODGE is a 73y Male with a past medical history of  HTN, GERD, CKD, GERD, prostate cancer s/p brachytherapy and chronic indwelling portillo. Presented with abdominal pain, found to have ANIA and UTI in the setting of bilateral ureteral obstruction.    Hospital Course Problem List/Main Diagnoses:     # ANIA on CKD  On admission patient had a creatinine of 4.25, and found to have bilateral ureteral obstruction. Interventional radiology placed bilateral percutaneous nephrostomy tubes which are draining well. On discharge the patients creatinine was 2.09  -Patient family (wife/daughter) should continue to change nephrostomy tube and portillo as per IR and urology recommendations.  -f/u with Dr. Robledo (Urologist)  07/11/2023 01:10 PM  -f/u with Dr. Panda (PCP/nephrology) 08/15/2023 04:30 PM  -f/u with Dr. Bush (PCP) 07/14/2023 12:00 PM    # UTI  Patient had urine cultures positive for MRSA. He was given Linezolid 600mg q12 for 6 days, last day of antibiotics is 7/13/23.   -c/w Linezolid through 7/13/23    -f/u with Dr. Robledo (Urologist)  07/11/2023 01:10 PM  -f/u with Dr. Panda (PCP/Nephrology) 08/15/2023 04:30 PM    #Anemia  During hospital stay patients Hgb was found to be between 7.7-9.1, possibly secondary to patients CKD. The patients Hgb remained stable in this range during his hospital stay. Iron studies suggest anemia of chronic disease.   -f/u with Dr. Bush (PCP) 07/14/2023 12:00 PM  -f/u with Dr. Panda (PCP/Nephrology) 08/15/2023 04:30 PM    # Atrial fibulation   Patient has a history of Afib, and his home Eliquis 5mg was held prior to procedure (PCN), and will continue to hold Eliquis on discharge.   -f/u with Dr. Torres about when to restart home Eliquis.   -f/u with Dr. Bush (PCP) 07/14/2023 12:00 PM    #BPH  Patient with a history of BPH, his finasteride 5mg daily, Tamsulosin 0.4 mg at bedtime and oxybutynin 5 mg PRN was continued during his hospital stay.   -f/u with Dr. Robledo (Urologist)  07/11/2023 01:10 PM  -c/w home finasteride 5mg daily, Tamsulosin 0.4 mg at bedtime and oxybutynin 5 mg PRN    Patient was discharged to: home    New medications: Linezolid 600mg q12hrs for 6 days, to be taken though 7/13/23    Changes to old medications: No    Medications that were stopped: eliquis    Items to follow up as outpatient:   -Nephrostomy tube and portillo changes per IR and nephrology recommendations.   -f/u with Dr. Robledo (Urologist)  07/11/2023 01:10 PM  -f/u with Dr. Panda (Nephrology) 08/15/2023 04:30 PM  -f/u with Dr. Bush (PCP) 07/14/2023 12:00 PM    Physical exam at the time of discharge:   GEN: NAD   HEENT: NC/AT  CV: RRR  PULM: CTAB  ABD: Soft, non-distended, non-tender  NEURO A/O x3, moving all extremities, Sensation intact  : b/l nephrostomy tubes in place   PSYCH: Appropriate     #Discharge: do not delete    ANGELINA DODGE is a 73y Male with a past medical history of  HTN, GERD, CKD, GERD, prostate cancer s/p brachytherapy and chronic indwelling portillo. Presented with abdominal pain, found to have ANIA and UTI in the setting of bilateral ureteral obstruction.    Hospital Course Problem List/Main Diagnoses:     # ANIA on CKD  On admission patient had a creatinine of 4.25, and found to have bilateral ureteral obstruction. Interventional radiology placed bilateral percutaneous nephrostomy tubes which are draining well. On discharge the patients creatinine was 2.09  -Patient family (wife/daughter) should continue to change nephrostomy tube and portillo as per IR and urology recommendations.  -f/u with Dr. Robledo (Urologist)  07/11/2023 01:10 PM  -f/u with Dr. Panda (PCP/nephrology) 08/15/2023 04:30 PM  -f/u with Dr. Bush (PCP) 07/14/2023 12:00 PM    # UTI  Patient had urine cultures positive for MRSA. He was given Linezolid 600mg q12 for 6 days, last day of antibiotics is 7/13/23.   -c/w Linezolid through 7/13/23    -f/u with Dr. Robledo (Urologist)  07/11/2023 01:10 PM  -f/u with Dr. Panda (PCP/Nephrology) 08/15/2023 04:30 PM    #Anemia  During hospital stay patients Hgb was found to be between 7.7-9.1, possibly secondary to patients CKD. The patients Hgb remained stable in this range during his hospital stay. Iron studies suggest anemia of chronic disease.   -f/u with Dr. Bush (PCP) 07/14/2023 12:00 PM  -f/u with Dr. Panda (PCP/Nephrology) 08/15/2023 04:30 PM    # Atrial fibulation   Patient has a history of Afib, and his home Eliquis 5mg was held prior to procedure (PCN), and will continue to hold Eliquis on discharge.   -f/u with Dr. Torres about when to restart home Eliquis.   -f/u with Dr. Bush (PCP) 07/14/2023 12:00 PM    #BPH  Patient with a history of BPH, his finasteride 5mg daily, Tamsulosin 0.4 mg at bedtime and oxybutynin 5 mg PRN was continued during his hospital stay.   -f/u with Dr. Robledo (Urologist)  07/11/2023 01:10 PM  -c/w home finasteride 5mg daily, Tamsulosin 0.4 mg at bedtime and oxybutynin 5 mg PRN  - discharge with chronic portillo, f/u outpatient    Patient was discharged to: home    New medications: Linezolid 600mg q12hrs for 6 days, to be taken though 7/13/23    Changes to old medications: No    Medications that were stopped: eliquis    Items to follow up as outpatient:   -Nephrostomy tube and portillo changes per IR and nephrology recommendations.   -f/u with Dr. Robledo (Urologist)  07/11/2023 01:10 PM  -f/u with Dr. Panda (Nephrology) 08/15/2023 04:30 PM  -f/u with Dr. Bush (PCP) 07/14/2023 12:00 PM    Physical exam at the time of discharge:   GEN: NAD   HEENT: NC/AT  CV: RRR  PULM: CTAB  ABD: Soft, non-distended, non-tender  NEURO A/O x3, moving all extremities, Sensation intact  : b/l nephrostomy tubes in place   PSYCH: Appropriate

## 2023-07-07 NOTE — PROGRESS NOTE ADULT - PROBLEM SELECTOR PLAN 7
- c/w home flomax and finasteride

## 2023-07-07 NOTE — PROGRESS NOTE ADULT - ASSESSMENT
72M somewhat poor historian PMH Afib (not on AC), HTN, GERD, prostate cancer s/p brachytherapy, neurogenic bladder managed with chronic indwelling portillo admitted to medicine for UTI, ANIA. Urology consulted for moderate bilateral hydronephrosis on US i/s/o ANIA. FeNa 5.8% - suggestive of post-obstructive ANIA. Cr 4.68 (baseline 0.92). 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 S/p cystoscopy, attempted bilateral ureteral stent placement 7/5 however was unsuccessful. Now s/p b/l PCN placement by IR 7/6.    Recommendations:  -Please obtain PSA  -Continue portillo  -F/u UCx  -Broad spectrum IV abx for treatment of UTI per primary team, would cover for Pseudomonas  -Appreciate nephrology recommendations  -Appreciate excellent care per primary team  -Discussed with attending

## 2023-07-07 NOTE — DISCHARGE NOTE PROVIDER - NSDCFUADDINST_GEN_ALL_CORE_FT
Bilateral nephrostomy care instructions: cleanse insertion sites with normal saline twice weekly, then apply drainage sponge and/or gauze with tegaderm.

## 2023-07-07 NOTE — CONSULT NOTE ADULT - SUBJECTIVE AND OBJECTIVE BOX
INFECTIOUS DISEASES INITIAL CONSULT NOTE    HPI:  72M somewhat poor historian PMH Afib (not on AC), HTN, GERD,  prostate cancer s/p brachytherapy, chronic indwelling portillo presents for fever of 101F at home and abnormal labs. He reports going to his GI for abdominal pain 4 days ago and was informed about Hgb 8.3 and BUN/Cr 75/3.72. He does not know his baseline kidney function but reports it being abnormal previously. Reports poor PO intake over the last week. Portillo last changed 4 weeks ago.  In the ED, VSS.   Given CTX 1g, 1L NS. (04 Jul 2023 02:12)  renal US showing b/l renal cysts & parenchymal disease, moderate b/l hydronephrosis. urology consulted. ordered a CT abd pelvis per urology for post renal ANIA based on FeUrea (portillo was replaced in ED). per collateral from outpatient urologist Dr Robledo, pt has baseline Cr of 0.9, also has hx of pseudomonas in his urine.   broadened from CTX 1g to zosyn 4.5g to cover empirically. renal consulted, recommend if no urinary obstruction on imaging & no signs of vol overload: 100ml/hr NS x 5 hours (pt may have component of pre-renal ANIA since BUN improved w/ 1L bolus in ED), no indication for HD.   going to OR w/ urology for cystoscopy & b/l ureteral stent placement today. repeat UA w/ UCx sent, urology was not able to place stens today due, urology recs we reach out to IR chloe for b/l percutaneous nephrostomy.   went for IR procedure, placed b/l 10 Hebrew nephrostomy catheters for hydronephrosis. restarted diet.   nephrostomy fluid growing MRSA, placed isolation precautions. TTE ordered and blood clx                                                                   PAST MEDICAL & SURGICAL HISTORY:        Review of Systems:   Constitutional, eyes, ENT, cardiovascular, respiratory, gastrointestinal, genitourinary, integumentary, neurological, psychiatric and heme/lymph are otherwise negative other than noted above       ANTIBIOTICS:  MEDICATIONS  (STANDING):  diltiazem    milliGRAM(s) Oral every 24 hours  finasteride 5 milliGRAM(s) Oral daily  latanoprost 0.005% Ophthalmic Solution 1 Drop(s) Both EYES at bedtime  pantoprazole    Tablet 40 milliGRAM(s) Oral before breakfast  piperacillin/tazobactam IVPB.. 4.5 Gram(s) IV Intermittent every 8 hours  polyethylene glycol 3350 17 Gram(s) Oral daily  senna 2 Tablet(s) Oral at bedtime  sodium chloride 0.45%. 1000 milliLiter(s) (60 mL/Hr) IV Continuous <Continuous>  tamsulosin 0.4 milliGRAM(s) Oral at bedtime    MEDICATIONS  (PRN):  acetaminophen     Tablet .. 650 milliGRAM(s) Oral every 6 hours PRN Temp greater or equal to 38C (100.4F), Mild Pain (1 - 3)  melatonin 3 milliGRAM(s) Oral at bedtime PRN Insomnia  oxybutynin 5 milliGRAM(s) Oral every 8 hours PRN Bladder spasms      Allergies    No Known Allergies    Intolerances        SOCIAL HISTORY:    FAMILY HISTORY:   no FH leading to current infection    Vital Signs Last 24 Hrs  T(C): 36.3 (07 Jul 2023 12:45), Max: 36.6 (07 Jul 2023 05:33)  T(F): 97.4 (07 Jul 2023 12:45), Max: 97.8 (07 Jul 2023 05:33)  HR: 95 (07 Jul 2023 12:45) (89 - 97)  BP: 110/74 (07 Jul 2023 12:45) (101/63 - 120/80)  BP(mean): 81 (06 Jul 2023 16:51) (81 - 81)  RR: 18 (07 Jul 2023 12:45) (17 - 19)  SpO2: 97% (07 Jul 2023 12:45) (96% - 99%)    Parameters below as of 07 Jul 2023 12:45  Patient On (Oxygen Delivery Method): room air        07-06-23 @ 07:01  -  07-07-23 @ 07:00  --------------------------------------------------------  IN: 0 mL / OUT: 3675 mL / NET: -3675 mL    07-07-23 @ 07:01  -  07-07-23 @ 12:50  --------------------------------------------------------  IN: 0 mL / OUT: 125 mL / NET: -125 mL        PHYSICAL EXAM:  Constitutional: alert, NAD  Eyes: the sclera and conjunctiva were normal.   ENT: the ears and nose were normal in appearance.   Neck: the appearance of the neck was normal and the neck was supple.   Pulmonary: no respiratory distress and lungs were clear to auscultation bilaterally.   Heart: heart rate was normal and rhythm regular, normal S1 and S2  Vascular:. there was no peripheral edema  Abdomen: normal bowel sounds, soft, non-tender  Neurological: no focal deficits.   Psychiatric: the affect was normal      LABS:                        9.4    16.38 )-----------( 311      ( 07 Jul 2023 05:30 )             30.2     07-07    138  |  110<H>  |  69<H>  ----------------------------<  135<H>  5.2   |  20<L>  |  4.66<H>    Ca    9.9      07 Jul 2023 05:30  Phos  3.8     07-07  Mg     1.8     07-07    TPro  6.5  /  Alb  2.9<L>  /  TBili  0.2  /  DBili  x   /  AST  13  /  ALT  10  /  AlkPhos  71  07-07    PT/INR - ( 06 Jul 2023 07:31 )   PT: 15.3 sec;   INR: 1.28          PTT - ( 06 Jul 2023 07:31 )  PTT:31.1 sec  Urinalysis Basic - ( 07 Jul 2023 05:30 )    Color: x / Appearance: x / SG: x / pH: x  Gluc: 135 mg/dL / Ketone: x  / Bili: x / Urobili: x   Blood: x / Protein: x / Nitrite: x   Leuk Esterase: x / RBC: x / WBC x   Sq Epi: x / Non Sq Epi: x / Bacteria: x        MICROBIOLOGY:    RADIOLOGY & ADDITIONAL STUDIES:   HPI:  72M somewhat poor historian PMH Afib (not on AC), HTN, GERD,  prostate cancer s/p brachytherapy, chronic indwelling portillo presents for fever of 101F at home and abnormal labs. He reports going to his GI for abdominal pain 4 days ago and was informed about Hgb 8.3 and BUN/Cr 75/3.72. He does not know his baseline kidney function but reports it being abnormal previously. Reports poor PO intake over the last week. Portillo last changed 4 weeks ago. Denies n/v, diarrhea, current abd pain.     In the ED, VSS.   Given CTX 1g, 1L NS. (04 Jul 2023 02:12)      PAST MEDICAL & SURGICAL HISTORY:        REVIEW OF SYSTEMS:    General:	 no weakness; no fevers, no chills  Skin/Breast: no rash  Respiratory and Thorax: no SOB, no cough  Cardiovascular:	No chest pain  Gastrointestinal:	 no nausea, vomiting , diarrhea  Genitourinary:	no dysuria, no difficulty urinating, no hematuria  Musculoskeletal:	no weakness, no joint swelling/pain  Neurological:	no focal weakness/numbness  Endocrine:	no polyuria, no polydipsia      ANTIBIOTICS:  MEDICATIONS  (STANDING):  diltiazem    milliGRAM(s) Oral every 24 hours  finasteride 5 milliGRAM(s) Oral daily  latanoprost 0.005% Ophthalmic Solution 1 Drop(s) Both EYES at bedtime  linezolid    Tablet 600 milliGRAM(s) Oral every 12 hours  pantoprazole    Tablet 40 milliGRAM(s) Oral before breakfast  polyethylene glycol 3350 17 Gram(s) Oral daily  senna 2 Tablet(s) Oral at bedtime  sodium chloride 0.45%. 1000 milliLiter(s) (60 mL/Hr) IV Continuous <Continuous>  tamsulosin 0.4 milliGRAM(s) Oral at bedtime    MEDICATIONS  (PRN):  acetaminophen     Tablet .. 650 milliGRAM(s) Oral every 6 hours PRN Temp greater or equal to 38C (100.4F), Mild Pain (1 - 3)  melatonin 3 milliGRAM(s) Oral at bedtime PRN Insomnia  oxybutynin 5 milliGRAM(s) Oral every 8 hours PRN Bladder spasms      Allergies    No Known Allergies    Intolerances        SOCIAL HISTORY:    FAMILY HISTORY:      Vital Signs Last 24 Hrs  T(C): 36.1 (07 Jul 2023 15:43), Max: 36.6 (07 Jul 2023 05:33)  T(F): 97 (07 Jul 2023 15:43), Max: 97.8 (07 Jul 2023 05:33)  HR: 99 (07 Jul 2023 15:43) (89 - 99)  BP: 119/76 (07 Jul 2023 15:43) (101/63 - 120/80)  BP(mean): --  RR: 18 (07 Jul 2023 15:43) (18 - 19)  SpO2: 99% (07 Jul 2023 15:43) (96% - 99%)    Parameters below as of 07 Jul 2023 15:43  Patient On (Oxygen Delivery Method): room air        PHYSICAL EXAM:  Constitutional: NAD  Eyes: WANDA, EOMI  Ear/Nose/Throat: no oral lesion, no sinus tenderness on percussion	  Neck: no JVD, no lymphadenopathy, supple  Respiratory: CTA cony  Cardiovascular: S1S2 RRR, no murmurs  Gastrointestinal: soft, (+) BS, no HSM  Extremities:no e/e/c  Vascular: DP Pulse: right normal; left normal            LABS:                        9.4    16.38 )-----------( 311      ( 07 Jul 2023 05:30 )             30.2     07-07    141  |  108  |  x   ----------------------------<  131<H>  4.4   |  x   |  x     Ca    9.5      07 Jul 2023 16:42  Phos  3.8     07-07  Mg     1.8     07-07    TPro  6.5  /  Alb  2.9<L>  /  TBili  0.2  /  DBili  x   /  AST  13  /  ALT  10  /  AlkPhos  71  07-07    PT/INR - ( 06 Jul 2023 07:31 )   PT: 15.3 sec;   INR: 1.28          PTT - ( 06 Jul 2023 07:31 )  PTT:31.1 sec  Urinalysis Basic - ( 07 Jul 2023 16:42 )    Color: x / Appearance: x / SG: x / pH: x  Gluc: 131 mg/dL / Ketone: x  / Bili: x / Urobili: x   Blood: x / Protein: x / Nitrite: x   Leuk Esterase: x / RBC: x / WBC x   Sq Epi: x / Non Sq Epi: x / Bacteria: x        MICROBIOLOGY: reviewed  RADIOLOGY & ADDITIONAL STUDIES: reviewed

## 2023-07-07 NOTE — DISCHARGE NOTE PROVIDER - ATTENDING DISCHARGE PHYSICAL EXAMINATION:
72M with PMH Afib , HTN, ,CKD, GERD, prostate cancer s/p brachytherapy, chronic indwelling portillo presents for fever found to have a UTI as well as ANIA with creatinine of 4.3. Pt was admitted for further work up.  #MRSA UTI   -Renal US:  Bilateral renal cysts and parenchymal disease.  Moderate bilateral hydronephrosis.  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  - s/p b/l PCN by IR 7/6  - urine cx from R nephro tb w MRSA  7/6 - ID consulted- recommended to dc zosyn and start – linezolid 600 q12 x6 thru 7/13    #hematuria   in portillo and B/l PCN   repeat hgb 9-9.5-- will cont to monitor cbc   - fu with urology -   - will hold eliquis for now and pt can resume outpt if hematuria improves and hgb remains stable with PCP     #non oliguric ANIA on CKD due to post-renal obstruction 2/2 post-radiation distal ureteral strictures.  #low serum bicarb   #B/l moderate hydronephrosis  - baseline creatinine 0.92 from PCP Dr. Bush [phone]    - Urology took pt to OR for cystoscopy and bilateral ureteral stent placement 7/5-  unsuccessful and recommended IR for pcn->  s/p b/l PCN by IR 7/6  - cr 4.6-->   5.6 --4.6- -3.83 2.56 -> 2.09 today   - bicarb improved with improving renal fx   - will dc fluids 7/10  - fu renal recs and fu renal fux and urine outpt     #Chronic indwelling portillo   #Prostate cancer s/p brachytherapy  - uro recs appreciated - will cw portillo for now w b/l PCN   - Continue tamsulosin and finasteride     #Afib   - will hold AC 7/10 -- jadiel blood in nephrostomy tube and portillo - pt is advised to follow up with PCP to resume medication when hematuria improves and hgb is stable   - Continue diltiazem , metoprolol     Physical exam:  GENERAL: NAD, lying in bed comfortably  HEAD:  Atraumatic, Normocephalic  EYES: EOMI, PERRLA, conjunctiva and sclera clear  ENT: Moist mucous membranes  NECK: Supple, No JVD  CHEST/LUNG: Clear to auscultation bilaterally; No rales, rhonchi, wheezing, or rubs.  HEART: Regular rate and rhythm; S1+ S2+   ABDOMEN: Bowel sounds present; Soft, Nontender, Nondistended , B/L pcn with bloody urine , portillo with blood in urine as well   EXTREMITIES:  2+ Peripheral Pulses, brisk capillary refill. No clubbing, cyanosis, or edema  NERVOUS SYSTEM:  Alert & Oriented 2 , speech clear   MSK: FROM all 4 extremities, full and equal strength  SKIN: No rashes or lesions

## 2023-07-07 NOTE — PROGRESS NOTE ADULT - PROBLEM SELECTOR PLAN 3
2/2 worsening renal function was give sodium bicarb tabs.
2/2 worsening renal function. sodium bicarb stopped
2/2 worsening renal function.   - start sodium bicarb tabs TID
2/2 worsening renal function was give sodium bicarb tabs.

## 2023-07-07 NOTE — CONSULT NOTE ADULT - CONSULT REASON
Bilateral hydronephrosis with inability to place retrograde stents in OR on 7/5.
ANIA
Ab guidance
PM&R evaluation
ANIA/ b/l hydronephrosis

## 2023-07-07 NOTE — PROGRESS NOTE ADULT - PROBLEM SELECTOR PLAN 1
baseline Cr 0.9 per pts outpatient urologist Dr Robledo but he reports having abnormal renal function in the past. Labs from 1 week Prior to admission was BUN/Cr 75/3.72 (late June). Takes lasix 20mg qD as home med. Autoimmune workup was negative. A renal sono showed b/l mild hydronephrosis. IR placed b/l percutaneous nephrostomy tubes on 7/6/23 7/4/23: BUN/Cr 78/4.25  7/7/23: BUN/Cr is 69/4.66  - cont to f/u urine studies Uosm, Liza, Uurea, UCr  - urine ACR is 556  - ordered GN workup, dsDNA, ALTON, ANCA w/reflex, C3, C4, COLLIN, SPEP, free light chains, PLA2R, and anti-GBM   - strict Is and Os  - f/c renal recs

## 2023-07-07 NOTE — PROGRESS NOTE ADULT - ASSESSMENT
72M PMH Afib, HTN, CKD, prostate cancer s/p brachytherapy, chronic indwelling portillo presents for fever of 101F at home and abnormal labs, found to have ANIA secondary to bilateral ureteral obstruction.

## 2023-07-07 NOTE — DISCHARGE NOTE PROVIDER - NSDCMRMEDTOKEN_GEN_ALL_CORE_FT
Aspir 81 oral delayed release tablet: 1 orally  betaxolol 0.5% ophthalmic solution: 1 in each affected eye  cyproheptadine 4 mg oral tablet: 1 orally  DilTIAZem Hydrochloride  mg/24 hours oral capsule, extended release: 1 orally  docusate sodium 100 mg oral capsule: 1 orally  Eliquis 5 mg oral tablet: 1 orally  finasteride 5 mg oral tablet: 1 orally  Flomax 0.4 mg oral capsule: 1 orally  furosemide 20 mg oral tablet: 1 orally  GaviLAX oral powder for reconstitution: 17 orally  linaclotide 145 mcg oral capsule: 1 orally  Linzess 145 mcg oral capsule: 1 orally  olopatadine 0.2% ophthalmic solution: 1 in each affected eye  omeprazole 40 mg oral delayed release capsule: 1 orally  oxyBUTYnin 5 mg oral tablet: 1 orally  timolol maleate 0.5% ophthalmic solution: 1 in each affected eye  Toviaz 8 mg oral tablet, extended release: 1 orally   Aspir 81 oral delayed release tablet: 1 orally  betaxolol 0.5% ophthalmic solution: 1 in each affected eye  cyproheptadine 4 mg oral tablet: 1 orally  DilTIAZem Hydrochloride  mg/24 hours oral capsule, extended release: 1 orally  Docu Soft 100 mg oral capsule: 1 orally  docusate sodium 100 mg oral capsule: 1 orally  Eliquis 5 mg oral tablet: 1 orally  finasteride 5 mg oral tablet: 1 orally  Flomax 0.4 mg oral capsule: 1 orally  furosemide 20 mg oral tablet: 1 orally  GaviLAX oral powder for reconstitution: 17 orally  linaclotide 145 mcg oral capsule: 1 orally  Linzess 145 mcg oral capsule: 1 orally  metoprolol succinate 100 mg oral capsule, extended release: 1 orally  olopatadine 0.2% ophthalmic solution: 1 in each affected eye  omeprazole 40 mg oral delayed release capsule: 1 orally  oxyBUTYnin 5 mg oral tablet: 1 orally  timolol maleate 0.5% ophthalmic solution: 1 in each affected eye  Toviaz 8 mg oral tablet, extended release: 1 orally   Aspir 81 oral delayed release tablet: 1 orally  betaxolol 0.5% ophthalmic solution: 1 in each affected eye  DilTIAZem Hydrochloride  mg/24 hours oral capsule, extended release: 1 orally  Docu Soft 100 mg oral capsule: 1 orally  docusate sodium 100 mg oral capsule: 1 orally  finasteride 5 mg oral tablet: 1 orally  Flomax 0.4 mg oral capsule: 1 orally  GaviLAX oral powder for reconstitution: 17 orally  linaclotide 145 mcg oral capsule: 1 orally  linezolid 600 mg oral tablet: 1 tab(s) orally every 12 hours  Linzess 145 mcg oral capsule: 1 orally  metoprolol succinate 100 mg oral capsule, extended release: 1 orally  olopatadine 0.2% ophthalmic solution: 1 in each affected eye  omeprazole 40 mg oral delayed release capsule: 1 orally  oxyBUTYnin 5 mg oral tablet: 1 orally  timolol maleate 0.5% ophthalmic solution: 1 in each affected eye   aspirin 81 mg oral tablet, chewable: 1 tab(s) orally once a day  betaxolol 0.5% ophthalmic solution: 1 in each affected eye  DilTIAZem Hydrochloride  mg/24 hours oral capsule, extended release: 1 orally  Docu Soft 100 mg oral capsule: 1 orally  docusate sodium 100 mg oral capsule: 1 orally  finasteride 5 mg oral tablet: 1 orally  Flomax 0.4 mg oral capsule: 1 orally  GaviLAX oral powder for reconstitution: 17 orally  linaclotide 145 mcg oral capsule: 1 orally  linezolid 600 mg oral tablet: 1 tab(s) orally every 12 hours  Linzess 145 mcg oral capsule: 1 orally  metoprolol tartrate 50 mg oral tablet: 1 tab(s) orally 2 times a day  olopatadine 0.2% ophthalmic solution: 1 in each affected eye  omeprazole 40 mg oral delayed release capsule: 1 orally  oxyBUTYnin 5 mg oral tablet: 1 orally  timolol maleate 0.5% ophthalmic solution: 1 in each affected eye   aspirin 81 mg oral tablet, chewable: 1 tab(s) orally once a day  betaxolol 0.5% ophthalmic solution: 1 in each affected eye  dilTIAZem 180 mg/24 hours oral capsule, extended release: 1 cap(s) orally every 24 hours  finasteride 5 mg oral tablet: 1 tab(s) orally once a day  linezolid 600 mg oral tablet: 1 tab(s) orally every 12 hours 7/11/23 to 7/13/23  olopatadine 0.2% ophthalmic solution: 1 in each affected eye  omeprazole 40 mg oral delayed release capsule: 1 orally  oxyBUTYnin 5 mg oral tablet: 1 tab(s) orally every 8 hours As needed Bladder spasms  tamsulosin 0.4 mg oral capsule: 1 cap(s) orally once a day (at bedtime)  timolol maleate 0.5% ophthalmic solution: 1 in each affected eye

## 2023-07-07 NOTE — PROGRESS NOTE ADULT - PROBLEM SELECTOR PLAN 4
unknown baseline. Possibly 2/2 worsening CKD iron studies wnl.
unknown baseline. Possibly 2/2 worsening CKD.  - f/u iron studies
unknown baseline. Possibly 2/2 worsening CKD iron studies wnl.
unknown baseline. Possibly 2/2 worsening CKD.  - iron studies

## 2023-07-07 NOTE — CONSULT NOTE ADULT - TIME BILLING
Briefly, 72 yo male with morbid obesity, prostate ca s/p brachytherapy, XRT, chronic indwelling [place], here with ANIA on CKD 2/2 b/l hydronephrosis, failed ureteral stent placement 2/2 bladder obstruction s/p b/l PCN 7/6--R nephrostomy tube with growth of MRSA. Advise d/c Zosyn, start linezolid 600mg PO q12h thru 7/13, exchange [place] catheter if not already done this admission.    Please reconsult with ?

## 2023-07-07 NOTE — DISCHARGE NOTE PROVIDER - CARE PROVIDER_API CALL
Tequila Bush  [facility]  [address] [place] [address]  Phone: [phone]  Fax: [phone]  Follow Up Time:     Josh Robledo  Urology  [address] [place] [address]  Phone: [phone]  Fax: [phone]  Follow Up Time:    Tequila Bush  [facility]  [address] [place] [address]  Phone: [phone]  Fax: [phone]  Follow Up Time:     Josh Robledo  Urology  [address] [place] [address]  Phone: [phone]  Fax: [phone]  Scheduled Appointment: 07/11/2023 01:10 PM   Josh Robledo  Urology  [address], [address] [place] [address]  Phone: [phone]  Fax: [phone]  Scheduled Appointment: 07/11/2023 01:10 PM    Tequila Bush  [facility]  [address] [place] [address]  Phone: [phone]  Fax: (   )    -  Established Patient  Scheduled Appointment: 07/14/2023 12:00 PM    Mike Panda  Nephrology  [address] [place] [address]  Phone: [phone]  Fax: [phone]  Follow Up Time:    Josh Robledo  Urology  [address]  [place] [address]  Phone: [phone]  Fax: [phone]  Scheduled Appointment: 07/11/2023 01:10 PM    Mike Panda  Nephrology  [address]  [place] [address]  Phone: [phone]  Fax: [phone]  Scheduled Appointment: 08/15/2023 04:30 PM    Tequila Bush  Family Medicine  [address] [place] [address]  Phone: [phone]  Fax: (   )    -  Established Patient  Scheduled Appointment: 07/14/2023 12:00 PM

## 2023-07-07 NOTE — DISCHARGE NOTE PROVIDER - NSDCCPCAREPLAN_GEN_ALL_CORE_FT
PRINCIPAL DISCHARGE DIAGNOSIS  Diagnosis: Bilateral ureteral obstruction  Assessment and Plan of Treatment: You came into the hospital because there was an obstuction in both of your ureters. Your ureter is part of the urinary system which allows urine to drain from your kidenys into your bladder, you have two ureters, one for each kinday. This obstuction was did not allow your kidney to excrete urine properly which resulted in urine back up in your kidneys. During your hospital stay, Interventinal Radilogy placed two tubes in both of your ureters to allow you urine to bypass the obsuction. Each of these tubes are attched to a colleting bag which will allow urine to drain freely and collect into bags on both sides of your body. Before you leave the hospital you will be shown how to empty and change the collecting bags. The urine drainig from these bags may initally drain a red color which is normal during the fisrt few days after you leave the hospital. This is becasue blood from the original procedure (putting in the tubes) is still drianing. However, if the drain continues to fill with red colored urine more than one week after you leave the hospital contact your DrSandi or come back to the hospital. Additinaly, if the tubes stop producing urine or you start to experinace fevers, chill or abdominal pain contact your DrSandi or come back to the hospital.      SECONDARY DISCHARGE DIAGNOSES  Diagnosis: Urinary tract infection  Assessment and Plan of Treatment: During your hospital stay urine cultures grew bacteria. You were given antibiotics in the hospital to treat this infection. The antibiotic you were given is called     PRINCIPAL DISCHARGE DIAGNOSIS  Diagnosis: Bilateral ureteral obstruction  Assessment and Plan of Treatment: You came into the hospital because there was an obstuction in both of your ureters. Your ureter is part of the urinary system which allows urine to drain from your kidenys into your bladder, you have two ureters, one for each kinday. This obstuction was did not allow your kidney to excrete urine properly which resulted in urine back up in your kidneys. During your hospital stay, Interventinal Radilogy placed two tubes in both of your ureters to allow you urine to bypass the obsuction. Each of these tubes are attched to a colleting bag which will allow urine to drain freely and collect into bags on both sides of your body. Before you leave the hospital you will be shown how to empty and change the collecting bags. The urine drainig from these bags may initally drain a red color which is normal during the fisrt few days after you leave the hospital. This is becasue blood from the original procedure (putting in the tubes) is still drianing. However, if the drain continues to fill with red colored urine more than one week after you leave the hospital contact Dr. Chua or come back to the hospital. Additinaly, if the tubes stop producing urine or you start to experinace fevers, chill or abdominal pain contact your  or come back to the hospital.      SECONDARY DISCHARGE DIAGNOSES  Diagnosis: Urinary tract infection  Assessment and Plan of Treatment: During your hospital stay your urine cultures grew bacteria. You were given antibiotics in the hospital to treat this infection. The antibiotic you were given is called Linezolid. You will continue to take this antibiotic through 7/13/23. If you start to experiance flank pain, fever or chills contact  or come back to the hospital.     PRINCIPAL DISCHARGE DIAGNOSIS  Diagnosis: Bilateral ureteral obstruction  Assessment and Plan of Treatment: You came into the hospital because there was an obstuction in both of your ureters. Your ureter is part of the urinary system which allows urine to drain from your kidenys into your bladder, you have two ureters, one for each kinday. This obstuction was did not allow your kidney to excrete urine properly which resulted in urine back up in your kidneys. During your hospital stay, Interventinal Radilogy placed two tubes in both of your ureters to allow you urine to bypass the obsuction. Each of these tubes are attched to a colleting bag which will allow urine to drain freely and collect into bags on both sides of your body. Before you leave the hospital you will be shown how to empty and change the collecting bags. The urine drainig from these bags may initally drain a red color which is normal during the fisrt few days after you leave the hospital. This is becasue blood from the original procedure (putting in the tubes) is still drianing. However, if the drain continues to fill with red colored urine more than one week after you leave the hospital contact Dr. Chua or come back to the hospital. Additionally, if the tubes stop producing urine or you start to experience fevers, chills, or abdominal pain contact your doctor or come back to the hospital.      SECONDARY DISCHARGE DIAGNOSES  Diagnosis: Urinary tract infection  Assessment and Plan of Treatment: During your hospital stay your urine cultures grew bacteria. You were given antibiotics in the hospital to treat this infection. The antibiotic you were given is called Linezolid. You will continue to take this antibiotic through 7/13/23. If you start to experiance flank pain, fever or chills contact Dr. Robledo or come back to the hospital.

## 2023-07-07 NOTE — DISCHARGE NOTE PROVIDER - NSDCFUADDAPPT_GEN_ALL_CORE_FT
Please bring your Insurance card, Photo ID and Discharge paperwork to the following appointment:    (1) Please follow up with your Urology Provider, Dr. Josh Robledo at [address] [place] [address], [place] [address] on 07/11/2023 at 1:10pm.    Appointment was scheduled by Ms. KALEY Jorgensen, Referral Coordinator.   Please bring your Insurance card, Photo ID and Discharge paperwork to the following appointments:    (1) Please follow up with your Urology Provider, Dr. Josh Robledo at [address] [place] [address], [place] [address] on 07/11/2023 at 1:10pm.    Appointment was scheduled by Ms. KALEY Jorgensen, Referral Coordinator.    (2) Please follow up with your Family Medicine Provider, Dr. Tequila Bush a [address] [place] [address] on 07/14/2023 at 12:00pm.    Appointment was scheduled by Ms. KALEY Jorgensen, Referral Coordinator.   Please bring your Insurance card, Photo ID and Discharge paperwork to the following appointments:    (1) Please follow up with your Urology Provider, Dr. Josh Robledo at [address], [address] [place] [id number] on 07/11/2023 at 1:10pm.    Appointment was scheduled by Ms. KALEY Jorgensen, Referral Coordinator.    (2) Please follow up with your Family Medicine Provider, Dr. Tequila shankar [address] [place] [address] on 07/14/2023 at 12:00pm.    Appointment was scheduled by Ms. KALEY Jorgensen, Referral Coordinator.    (3) Please follow up with your Nephrology Provider, Dr. Mike Panda at [address] [place] [address], [address] [place] [id number] on 08/15/2023 at 4:30pm.    Please note that the office will contact you for an earlier appointment once available.    Appointment was scheduled by Ms. KALEY Jorgensen, Referral Coordinator.

## 2023-07-07 NOTE — PROGRESS NOTE ADULT - ASSESSMENT
72 yo M w/ no known renal disease, admitted for ANIA Cr 4.2 with workup revealing b/l mod hydro, s/p unsuccessful attempt to place bilateral ureteral stents 7/5, then s/p b/l PCN placement by IR 7/6. Now / Cr downtrending, at 4.6, today after peak of 5.3 7/6. Pt also w/ post-obstructive diuresis ~3.7L UO/24 hours    #Non-oliguric ANIA due to post-renal obstruction  BCr not known, but reportedly normal, will confirm  UA w/ hematuria, proteinuria (UPCR 1.7). Hematuria due to portillo likely. Will need to follow the proteinuria, but at this time acute GN process unlikely  Liza 47  US w/ bilateral mod hydro, Rt 14.3cm, Lt 14.1cm, b/l increased echogenicity, decompressed bladder      Recommend:  Given post-ATN diuresis, start 0.45% NaCl at 60ml/hr continuously. If BP drops below 110, increase IV fluids to 70ml/hr. Can even bolus 250ml if severe drop in BP. Will reassess tomorrow if can decrease IV fluids and assess UO if still in post obstructive diuresis  Lytes and vol status stable, no indication of HD  Avoid nephrotoxic meds, ACEi/ARBs, NSAIDs. Dose meds for eGFR<15  BMP daily  Appreciate urology input

## 2023-07-07 NOTE — PROGRESS NOTE ADULT - PROBLEM SELECTOR PLAN 6
takes enalapril-HCTZ 10-25 at home. Will hold ACEi while working up worsening ANIA.   - resume HCTZ as appropriate

## 2023-07-07 NOTE — PROGRESS NOTE ADULT - ASSESSMENT
I M    72 y o M somewhat poor historian PMH Afib (not on AC), HTN, CKD, prostate cancer s/p brachytherapy, chronic indwelling portillo presents for fever of 101F at home and abnormal labs      Problem/Plan - 1:  ·  Problem: ANIA (acute kidney injury).   ·  Plan: baseline Cr 0.9 per pts outpatient urologist Dr Robledo but he reports having abnormal renal function in the past. Labs from last week with BUN/Cr 75/3.72. Making urine. At baseline mental status. Reports poor PO intake over the past week. Also with UTI. Perhaps pre-renal aspect. UA with protein and blood. Takes lasix 20mg qD as home med. ?CKD, hep pane was negative. renal sono showed b/l mild hydronephrosis. IR placed b/l percutaneous nephrostomy tubes today   - cont to f/u urine studies Uosm, Liza, Uurea, UCr  - urine ACR  - will order GN workup, dsDNA, ALTON, ANCA w/reflex, C3, C4, COLLIN, SPEP, free light chains, PLA2R, and anti-GBM   - strict Is and Os  - f/c renal recs.    Problem/Plan - 2:  ·  Problem: Acute UTI.   ·  Plan: UA positive and pt with hx of abdominal pain that is now resolved. Reports fever at home but not febrile on admission. Portillo exchanged in ED. Given CTX 1g. Also has hx of pseudomonas in his urine. broadened from CTX 1g to zosyn 4.5g to cover empirically. renal consulted, recommend if no urinary obstruction on imaging & no signs of vol overload: 100ml/hr NS x 5 hours (pt may have component of pre-renal ANIA since BUN improved w/ 1L bolus in ED),   - f/u UCx, blood cx  - c/w Zosyn.    Problem/Plan - 3:  ·  Problem: Metabolic acidosis.   ·  Plan: 2/2 worsening renal function was give sodium bicarb tabs.    Problem/Plan - 4:  ·  Problem: Anemia.   ·  Plan: unknown baseline. Possibly 2/2 worsening CKD iron studies wnl.    Problem/Plan - 5:  ·  Problem: Afib.   ·  Plan: Has hx of AFib but not on AC 2/2 prior bleed. Has diltiazem 180mg qD and toprol 100mg BID with him. Spoke with PCP and he said pt should be on his home Eliquis  -holding home eliquis postop   - c/w home diltiazem 180mg qD.    Problem/Plan - 6:  ·  Problem: HTN (hypertension).   ·  Plan: takes enalapril-HCTZ 10-25 at home. Will hold ACEi while working up worsening ANIA.   - resume HCTZ as appropriate.    Problem/Plan - 7:  ·  Problem: History of BPH.   ·  Plan: - c/w home flomax and finasteride.    Problem/Plan - 8:  ·  Problem: Prophylactic measure.   ·  Plan: F: PO  E: replete PRN  N: dash   DVT ppx: HSQ  GI PPx: None    FULL CODE    Dispo: [facility].

## 2023-07-07 NOTE — PROGRESS NOTE ADULT - ATTENDING COMMENTS
72M somewhat poor historian PMH Afib (not on AC), HTN, , ?CKD, GERD, prostate cancer s/p brachytherapy, chronic indwelling portillo presents for fever found to have a UTI as well as ANIA with creatinine of 4.3 with unknown baseline.     #UTI   -Will continue zosyn as pt has hx of pseudomonas in the past- however no symptoms of UTI will likely dc abx after 5 days   -Renal US:  Bilateral renal cysts and parenchymal disease.  Moderate bilateral hydronephrosis.  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  - s/p b/l PCN by IR 7/6  - urine cx from R nephro tb w MRSA , fu cx from L NT, will send blood cx and TTE   - cw zosyn   - fu ID recs     #non oliguric ANIA  #low serum bicarb   #B/l moderate hydronephrosis  - baseline creatinine 0.92 from PCP Dr. Bush [phone]    - Urology took pt to OR for cystoscopy and bilateral ureteral stent placement 7/5-  unsuccessful and recommended IR for pcn   - cr 4.6-->   5.6 --4.6 today   - s/p b/l PCN by IR 7/6  - will hold bicarb   - I/O cw post obs diuresis - will give gently hydration   - fu renal recs and fu renal fux and urine outpt     #Chronic indwelling portillo   #Prostate cancer s/p brachytherapy  - Continue tamsulosin and finasteride     #Afib   - will resume AC if ok   - Continue diltiazem     #DISPO  - PT evaluation

## 2023-07-07 NOTE — CONSULT NOTE ADULT - ASSESSMENT
72M somewhat poor historian PMH Afib (not on AC), HTN, GERD, , GERD, prostate cancer s/p brachytherapy, chronic indwelling portillo presents for fever of 101F at home and abnormal labs
I M    72 y o M somewhat poor historian PMH Afib (not on AC), HTN, CKD, prostate cancer s/p brachytherapy, chronic indwelling portillo presents for fever of 101F at home and abnormal labs      Problem/Plan - 1:  ·  Problem: ANIA (acute kidney injury).   ·  Plan: baseline Cr 0.9 per pts outpatient urologist Dr Robledo but he reports having abnormal renal function in the past. Labs from last week with BUN/Cr 75/3.72. Making urine. At baseline mental status. Reports poor PO intake over the past week. Also with UTI. Perhaps pre-renal aspect. UA with protein and blood. Takes lasix 20mg qD as home med. ?CKD  - f/u urine studies Uosm, Liza, Uurea, UCr  - urine ACR  - will order GN workup hepatitis panel, dsDNA, ALTON, ANCA w/reflex, C3, C4, COLLIN, SPEP, free light chains, PLA2R, and anti-GBM   - renal sono  - strict Is and Os  - f/c renal recs.    Problem/Plan - 2:  ·  Problem: Acute UTI.   ·  Plan: UA positive and pt with hx of abdominal pain that is now resolved. Reports fever at home but not febrile on admission. Portillo exchanged in ED. Given CTX 1g. Also has hx of pseudomonas in his urine. broadened from CTX 1g to zosyn 4.5g to cover empirically. renal consulted, recommend if no urinary obstruction on imaging & no signs of vol overload: 100ml/hr NS x 5 hours (pt may have component of pre-renal ANIA since BUN improved w/ 1L bolus in ED),   - f/u UCx, blood cx  - c/w CTX for 3 days.    Problem/Plan - 3:  ·  Problem: Metabolic acidosis.   ·  Plan: 2/2 worsening renal function.   - start sodium bicarb tabs TID.    Problem/Plan - 4:  ·  Problem: Anemia.   ·  Plan: unknown baseline. Possibly 2/2 worsening CKD.  - iron studies.    Problem/Plan - 5:  ·  Problem: Afib.   ·  Plan: Has hx of AFib but not on AC 2/2 prior bleed. Has diltiazem 180mg qD and toprol 100mg BID with him  - c/w home diltiazem 180mg qD  - obtain collateral from his PCP regarding AC and medications.    Problem/Plan - 6:  ·  Problem: HTN (hypertension).   ·  Plan: takes enalapril-HCTZ 10-25 at home. Will hold ACEi while working up worsening ANIA.   - resume HCTZ as appropriate.    Problem/Plan - 7:  ·  Problem: History of BPH.   ·  Plan: - c/w home flomax and finasteride.    Problem/Plan - 8:  ·  Problem: Prophylactic measure.   ·  Plan: F: PO  E: replete PRN  N: dash   DVT ppx: HSQ  GI PPx: None    FULL CODE    Dispo: [facility].  
Assessment:  72M somewhat poor historian PMH Afib (not on AC), HTN, CKD, prostate cancer s/p brachytherapy, chronic indwelling folly presents for fever of 101F at home and abnormal labs. IR consulted for bl PCN/Nephroureteral stent placement. Case reviewed with Dr. Martin. Plan for procedure today.      Recommendations: NPO at midnight prior to procedure with sedation/anesthesia. D/C blood thinners. Please ensure Covid swab is up to date (within last 72 hours).    Communicated with:  Urology resident
72 yo M w/ no known renal disease, admitted for ANIA Cr 4.2 with workup revealing b/l hydro. Pt had presented to ED for fever, found to have UTI.    #Non-oliguric ANIA  BCr not know, but reportedly, will confirm. On admission Cr 4.2-->4.3-->4.6  UA w/ hematuria, proteinuria (UPCR 1.7)  Liza 47  US w/ bilateral mod hydro, Rt 14.3cm, Lt 14.1cm, b/l increased echogenicity, decompressed bladder  Etiology likely post-renal obstruction. Given has had decreased PO intake and slight improvement in BUN after 1L fluid bolus, may have component of pre-renal     Recommend:  Appreciate urology input, pending b/l ureteral stent placement today  Lytes and vol status stable, no indication of HD  Monitor UO  Avoid nephrotoxic meds, ACEi/ARBs, NSAIDs. Dose meds for eGFR<15  
72 yo male with morbid obesity, prostate ca s/p brachytherapy, XRT, chronic indwelling Monterroso, here with ANIA on CKD 2/2 b/l hydronephrosis, failed ureteral stent placement 2/2 bladder obstruction s/p b/l PCN 7/6--R nephrostomy tube with growth of MRSA. Advise d/c Zosyn, start linezolid 600mg PO q12h thru 7/13, exchange Monterroso catheter if not already done this admission.    Please reconsult with ?

## 2023-07-07 NOTE — PROGRESS NOTE ADULT - ATTENDING COMMENTS
74 yo man with acute rise in creat  to 4.2 in setting of obstructive uropathy- bilat hydro at level of bladder  creat 4.6, K 5.2 CO2 20  thought to be related to ureteral strictures s/p RT   unable to stent from below 7/5, s/p bilateral PCN by IR 7/6  subsequent pot obstructive diuresis- 3.7 L UO for 24H  as above, start 0.45% NaCl at 60ml/hr continuously.  'IF running negative and if BP drops give bolus on .9NS and adjust hourly IVF rate higher   target net even to mild negative balance

## 2023-07-07 NOTE — PROGRESS NOTE ADULT - PROBLEM SELECTOR PROBLEM 1
Bilateral hydronephrosis
ANIA (acute kidney injury)

## 2023-07-07 NOTE — PROGRESS NOTE ADULT - PROBLEM SELECTOR PLAN 2
UA positive and pt with hx of abdominal pain that is now resolved. Reports fever at home but not febrile on admission. Monterroso exchanged in ED. Given CTX 1g. Also has hx of pseudomonas in his urine. broadened from CTX 1g to zosyn 4.5g to cover empirically. pt had b/l nephrostomy tubes placed on 7/6. right nephrostomy is growing MRSA, will change to linezolid 600mg PO q12 until 7/13  - f/u UCx, blood cx  - c/w linezolid 600mg PO q12 until 7/13 per ID recs.
UA positive and pt with hx of abdominal pain that is now resolved. Reports fever at home but not febrile on admission. Monterroso exchanged in ED. Given CTX 1g. Also has hx of pseudomonas in his urine. broadened from CTX 1g to zosyn 4.5g to cover empirically.   - f/u UCx, blood cx  - c/w 4.5 zosyn
UA positive and pt with hx of abdominal pain that is now resolved. Reports fever at home but not febrile on admission. Monterroso exchanged in ED. Given CTX 1g. Also has hx of pseudomonas in his urine. broadened from CTX 1g to zosyn 4.5g to cover empirically. renal consulted, recommend if no urinary obstruction on imaging & no signs of vol overload: 100ml/hr NS x 5 hours (pt may have component of pre-renal ANIA since BUN improved w/ 1L bolus in ED),   - f/u UCx, blood cx  - c/w CTX for 3 days
UA positive and pt with hx of abdominal pain that is now resolved. Reports fever at home but not febrile on admission. Monterroso exchanged in ED. Given CTX 1g. Also has hx of pseudomonas in his urine. broadened from CTX 1g to zosyn 4.5g to cover empirically. renal consulted, recommend if no urinary obstruction on imaging & no signs of vol overload: 100ml/hr NS x 5 hours (pt may have component of pre-renal ANIA since BUN improved w/ 1L bolus in ED),   - f/u UCx, blood cx  - c/w Zosyn

## 2023-07-07 NOTE — PROGRESS NOTE ADULT - PROBLEM SELECTOR PLAN 8
F: PO  E: replete PRN  N: dash   DVT ppx: HSQ  GI PPx: None    FULL CODE    Dispo: [facility]
F: PO  E: replete PRN  N: dash   DVT ppx: HSQ  GI PPx: None    FULL CODE    Dispo: [facility]
F: PO  E: replete PRN  N: dash   DVT ppx: HSQ  GI PPx: None    FULL CODE    Dispo: [facility]
F: PO  E: replete PRN  N: dash   DVT ppx: HSQ  GI PPx: None    FULL CODE    Dispo: [facility]

## 2023-07-07 NOTE — PROGRESS NOTE ADULT - PROBLEM SELECTOR PLAN 5
Has hx of AFib but not on AC 2/2 prior bleed. Has diltiazem 180mg qD and toprol 100mg BID with him. Spoke with PCP and he said pt should be on his home Eliquis  -holding home eliquis postop   - c/w home diltiazem 180mg qD Has hx of AFib but not on AC 2/2 prior bleed. Has diltiazem 180mg qD and toprol 100mg BID with him. Spoke with PCP and he said pt should be on his home Eliquis. IR is ok with restarted home Eliquis   - c/w home eliquis   - c/w home diltiazem 180mg qD

## 2023-07-08 LAB
-  CEFTRIAXONE: SIGNIFICANT CHANGE UP
-  CIPROFLOXACIN: SIGNIFICANT CHANGE UP
-  DAPTOMYCIN: SIGNIFICANT CHANGE UP
-  LINEZOLID: SIGNIFICANT CHANGE UP
-  LINEZOLID: SIGNIFICANT CHANGE UP
-  NITROFURANTOIN: SIGNIFICANT CHANGE UP
-  OXACILLIN: SIGNIFICANT CHANGE UP
-  OXACILLIN: SIGNIFICANT CHANGE UP
-  RIFAMPIN: SIGNIFICANT CHANGE UP
-  RIFAMPIN: SIGNIFICANT CHANGE UP
-  TETRACYCLINE: SIGNIFICANT CHANGE UP
-  TRIMETHOPRIM/SULFAMETHOXAZOLE: SIGNIFICANT CHANGE UP
-  TRIMETHOPRIM/SULFAMETHOXAZOLE: SIGNIFICANT CHANGE UP
-  VANCOMYCIN: SIGNIFICANT CHANGE UP
ALBUMIN SERPL ELPH-MCNC: 3 G/DL — LOW (ref 3.3–5)
ALP SERPL-CCNC: 59 U/L — SIGNIFICANT CHANGE UP (ref 40–120)
ALT FLD-CCNC: 12 U/L — SIGNIFICANT CHANGE UP (ref 10–45)
ANION GAP SERPL CALC-SCNC: 11 MMOL/L — SIGNIFICANT CHANGE UP (ref 5–17)
AST SERPL-CCNC: 14 U/L — SIGNIFICANT CHANGE UP (ref 10–40)
BASOPHILS # BLD AUTO: 0.05 K/UL — SIGNIFICANT CHANGE UP (ref 0–0.2)
BASOPHILS NFR BLD AUTO: 0.4 % — SIGNIFICANT CHANGE UP (ref 0–2)
BILIRUB SERPL-MCNC: 0.2 MG/DL — SIGNIFICANT CHANGE UP (ref 0.2–1.2)
BLD GP AB SCN SERPL QL: NEGATIVE — SIGNIFICANT CHANGE UP
BUN SERPL-MCNC: 57 MG/DL — HIGH (ref 7–23)
CALCIUM SERPL-MCNC: 9.9 MG/DL — SIGNIFICANT CHANGE UP (ref 8.4–10.5)
CHLORIDE SERPL-SCNC: 110 MMOL/L — HIGH (ref 96–108)
CO2 SERPL-SCNC: 21 MMOL/L — LOW (ref 22–31)
CREAT SERPL-MCNC: 3.83 MG/DL — HIGH (ref 0.5–1.3)
CULTURE RESULTS: SIGNIFICANT CHANGE UP
EGFR: 16 ML/MIN/1.73M2 — LOW
EOSINOPHIL # BLD AUTO: 0 K/UL — SIGNIFICANT CHANGE UP (ref 0–0.5)
EOSINOPHIL NFR BLD AUTO: 0 % — SIGNIFICANT CHANGE UP (ref 0–6)
GLUCOSE BLDC GLUCOMTR-MCNC: 111 MG/DL — HIGH (ref 70–99)
GLUCOSE BLDC GLUCOMTR-MCNC: 148 MG/DL — HIGH (ref 70–99)
GLUCOSE SERPL-MCNC: 90 MG/DL — SIGNIFICANT CHANGE UP (ref 70–99)
HCT VFR BLD CALC: 29.1 % — LOW (ref 39–50)
HGB BLD-MCNC: 9.1 G/DL — LOW (ref 13–17)
IMM GRANULOCYTES NFR BLD AUTO: 0.6 % — SIGNIFICANT CHANGE UP (ref 0–0.9)
LYMPHOCYTES # BLD AUTO: 1.77 K/UL — SIGNIFICANT CHANGE UP (ref 1–3.3)
LYMPHOCYTES # BLD AUTO: 15.3 % — SIGNIFICANT CHANGE UP (ref 13–44)
MAGNESIUM SERPL-MCNC: 1.6 MG/DL — SIGNIFICANT CHANGE UP (ref 1.6–2.6)
MCHC RBC-ENTMCNC: 28 PG — SIGNIFICANT CHANGE UP (ref 27–34)
MCHC RBC-ENTMCNC: 31.3 GM/DL — LOW (ref 32–36)
MCV RBC AUTO: 89.5 FL — SIGNIFICANT CHANGE UP (ref 80–100)
METHOD TYPE: SIGNIFICANT CHANGE UP
MONOCYTES # BLD AUTO: 0.93 K/UL — HIGH (ref 0–0.9)
MONOCYTES NFR BLD AUTO: 8 % — SIGNIFICANT CHANGE UP (ref 2–14)
NEUTROPHILS # BLD AUTO: 8.78 K/UL — HIGH (ref 1.8–7.4)
NEUTROPHILS NFR BLD AUTO: 75.7 % — SIGNIFICANT CHANGE UP (ref 43–77)
NRBC # BLD: 0 /100 WBCS — SIGNIFICANT CHANGE UP (ref 0–0)
ORGANISM # SPEC MICROSCOPIC CNT: SIGNIFICANT CHANGE UP
ORGANISM # SPEC MICROSCOPIC CNT: SIGNIFICANT CHANGE UP
PHOSPHATE SERPL-MCNC: 3.7 MG/DL — SIGNIFICANT CHANGE UP (ref 2.5–4.5)
PLATELET # BLD AUTO: 321 K/UL — SIGNIFICANT CHANGE UP (ref 150–400)
POTASSIUM SERPL-MCNC: 4.4 MMOL/L — SIGNIFICANT CHANGE UP (ref 3.5–5.3)
POTASSIUM SERPL-SCNC: 4.4 MMOL/L — SIGNIFICANT CHANGE UP (ref 3.5–5.3)
PROT SERPL-MCNC: 6.4 G/DL — SIGNIFICANT CHANGE UP (ref 6–8.3)
PSA FLD-MCNC: <0.01 NG/ML — SIGNIFICANT CHANGE UP (ref 0–4)
RBC # BLD: 3.25 M/UL — LOW (ref 4.2–5.8)
RBC # FLD: 15.2 % — HIGH (ref 10.3–14.5)
RH IG SCN BLD-IMP: POSITIVE — SIGNIFICANT CHANGE UP
SODIUM SERPL-SCNC: 142 MMOL/L — SIGNIFICANT CHANGE UP (ref 135–145)
SPECIMEN SOURCE: SIGNIFICANT CHANGE UP
WBC # BLD: 11.6 K/UL — HIGH (ref 3.8–10.5)
WBC # FLD AUTO: 11.6 K/UL — HIGH (ref 3.8–10.5)

## 2023-07-08 PROCEDURE — 99233 SBSQ HOSP IP/OBS HIGH 50: CPT

## 2023-07-08 PROCEDURE — 99233 SBSQ HOSP IP/OBS HIGH 50: CPT | Mod: GC

## 2023-07-08 RX ORDER — MAGNESIUM SULFATE 500 MG/ML
2 VIAL (ML) INJECTION ONCE
Refills: 0 | Status: COMPLETED | OUTPATIENT
Start: 2023-07-08 | End: 2023-07-08

## 2023-07-08 RX ORDER — SODIUM CHLORIDE 9 MG/ML
1000 INJECTION INTRAMUSCULAR; INTRAVENOUS; SUBCUTANEOUS
Refills: 0 | Status: DISCONTINUED | OUTPATIENT
Start: 2023-07-08 | End: 2023-07-08

## 2023-07-08 RX ORDER — METOPROLOL TARTRATE 50 MG
25 TABLET ORAL EVERY 12 HOURS
Refills: 0 | Status: DISCONTINUED | OUTPATIENT
Start: 2023-07-08 | End: 2023-07-10

## 2023-07-08 RX ORDER — SODIUM CHLORIDE 9 MG/ML
1000 INJECTION, SOLUTION INTRAVENOUS
Refills: 0 | Status: DISCONTINUED | OUTPATIENT
Start: 2023-07-08 | End: 2023-07-08

## 2023-07-08 RX ORDER — SODIUM CHLORIDE 9 MG/ML
250 INJECTION, SOLUTION INTRAVENOUS
Refills: 0 | Status: COMPLETED | OUTPATIENT
Start: 2023-07-08 | End: 2023-07-09

## 2023-07-08 RX ORDER — SODIUM CHLORIDE 9 MG/ML
1000 INJECTION, SOLUTION INTRAVENOUS
Refills: 0 | Status: DISCONTINUED | OUTPATIENT
Start: 2023-07-08 | End: 2023-07-10

## 2023-07-08 RX ADMIN — Medication 25 GRAM(S): at 13:35

## 2023-07-08 RX ADMIN — APIXABAN 5 MILLIGRAM(S): 2.5 TABLET, FILM COATED ORAL at 19:09

## 2023-07-08 RX ADMIN — PANTOPRAZOLE SODIUM 40 MILLIGRAM(S): 20 TABLET, DELAYED RELEASE ORAL at 06:15

## 2023-07-08 RX ADMIN — FINASTERIDE 5 MILLIGRAM(S): 5 TABLET, FILM COATED ORAL at 13:34

## 2023-07-08 RX ADMIN — LINEZOLID 600 MILLIGRAM(S): 600 INJECTION, SOLUTION INTRAVENOUS at 06:15

## 2023-07-08 RX ADMIN — TAMSULOSIN HYDROCHLORIDE 0.4 MILLIGRAM(S): 0.4 CAPSULE ORAL at 21:54

## 2023-07-08 RX ADMIN — Medication 25 MILLIGRAM(S): at 14:42

## 2023-07-08 RX ADMIN — LATANOPROST 1 DROP(S): 0.05 SOLUTION/ DROPS OPHTHALMIC; TOPICAL at 21:54

## 2023-07-08 RX ADMIN — Medication 180 MILLIGRAM(S): at 06:15

## 2023-07-08 RX ADMIN — APIXABAN 5 MILLIGRAM(S): 2.5 TABLET, FILM COATED ORAL at 06:15

## 2023-07-08 RX ADMIN — SODIUM CHLORIDE 80 MILLILITER(S): 9 INJECTION, SOLUTION INTRAVENOUS at 13:37

## 2023-07-08 RX ADMIN — LINEZOLID 600 MILLIGRAM(S): 600 INJECTION, SOLUTION INTRAVENOUS at 19:09

## 2023-07-08 NOTE — PROGRESS NOTE ADULT - ASSESSMENT
PCP: Dr. Robledo  73M w AF on Eliquis, morbid obesity, prostate ca s/p brachytherapy, XRT, chronic Monterroso, p/w ANIA on CKD 2/2 b/l hydronephrosis, failed ureteral stent placement 2/2 bladder obstruction s/p b/l PCN 7/6--R nephrostomy tube with growth of MRSA     #ANIA on CKD – Cr 3.83 from 4.37. likely obstructive. failured ureteral stent placement and now s/p b/l percutaneous nephrostomy tubes on 7/6    #MRSA UTI   7/6 - UCx MRSA – linezolid 600 q12 x6 thru 7/13    #Anemia – 9.1 from 9.4. TSat 13  #AF – Eliquis 5 BID, dilt 180. TTE – LVEF 65  #BPH – finasteride, oxybutynin, flomax    Plan  Overall Renal function continues to improve.  Please track accurate intake (Oral and IV fluids). Likely net NEGATIVE 2L (-5.5L UOP; +1.1L IV fluids; approx +2L PO intake)   - Continuing IV fluid hydration - 80cc/hr NS   Obtain standing weights in AM  f/u Nephrology recs; ID recs  c/w Linezolid    DISPO: Home w HPT pending improvement in post-obstructive diuresis.

## 2023-07-08 NOTE — PROGRESS NOTE ADULT - ASSESSMENT
72 yo M w/ no known renal disease, admitted for ANIA Cr 4.2 with workup revealing b/l mod hydro, s/p unsuccessful attempt to place bilateral ureteral stents 7/5, then s/p b/l PCN placement by IR 7/6. Now / Cr downtrending, at 3.8, today after peak of 5.3 7/6. Pt also w/ post-obstructive diuresis ~5.5L UO/24 hours    #Non-oliguric ANIA due to post-renal obstruction  BCr not known, but reportedly normal, will confirm  UA w/ hematuria, proteinuria (UPCR 1.7). Hematuria due to portillo likely. Will need to follow the proteinuria, but at this time acute GN process unlikely  Liza 47  US w/ bilateral mod hydro, Rt 14.3cm, Lt 14.1cm, b/l increased echogenicity, decompressed bladder      Recommend:  Given post-ATN diuresis and increase in UO as well as pt tachycardic, increase 0.45% NaCl to 80ml/hr continuously. Can also bolus 250ml 0.45%saline now as drop in intravascular vol can be a trigger for his afib  Will reassess tomorrow if can decrease IV fluids and assess UO if still in post obstructive diuresis  Lytes and vol status stable, no indication of HD  Avoid nephrotoxic meds, ACEi/ARBs, NSAIDs. Dose meds for eGFR<15  BMP daily  Appreciate urology input

## 2023-07-09 LAB
-  DAPTOMYCIN: SIGNIFICANT CHANGE UP
-  LINEZOLID: SIGNIFICANT CHANGE UP
-  OXACILLIN: SIGNIFICANT CHANGE UP
-  RIFAMPIN: SIGNIFICANT CHANGE UP
-  TETRACYCLINE: SIGNIFICANT CHANGE UP
-  TRIMETHOPRIM/SULFAMETHOXAZOLE: SIGNIFICANT CHANGE UP
-  VANCOMYCIN: SIGNIFICANT CHANGE UP
-  VANCOMYCIN: SIGNIFICANT CHANGE UP
ALBUMIN SERPL ELPH-MCNC: 2.8 G/DL — LOW (ref 3.3–5)
ALP SERPL-CCNC: 62 U/L — SIGNIFICANT CHANGE UP (ref 40–120)
ALT FLD-CCNC: 14 U/L — SIGNIFICANT CHANGE UP (ref 10–45)
ANION GAP SERPL CALC-SCNC: 13 MMOL/L — SIGNIFICANT CHANGE UP (ref 5–17)
AST SERPL-CCNC: 18 U/L — SIGNIFICANT CHANGE UP (ref 10–40)
BASOPHILS # BLD AUTO: 0.05 K/UL — SIGNIFICANT CHANGE UP (ref 0–0.2)
BASOPHILS NFR BLD AUTO: 0.5 % — SIGNIFICANT CHANGE UP (ref 0–2)
BILIRUB SERPL-MCNC: 0.2 MG/DL — SIGNIFICANT CHANGE UP (ref 0.2–1.2)
BUN SERPL-MCNC: 40 MG/DL — HIGH (ref 7–23)
CALCIUM SERPL-MCNC: 9.8 MG/DL — SIGNIFICANT CHANGE UP (ref 8.4–10.5)
CHLORIDE SERPL-SCNC: 113 MMOL/L — HIGH (ref 96–108)
CO2 SERPL-SCNC: 19 MMOL/L — LOW (ref 22–31)
CREAT SERPL-MCNC: 2.56 MG/DL — HIGH (ref 0.5–1.3)
CULTURE RESULTS: NO GROWTH — SIGNIFICANT CHANGE UP
CULTURE RESULTS: SIGNIFICANT CHANGE UP
EGFR: 25 ML/MIN/1.73M2 — LOW
EOSINOPHIL # BLD AUTO: 0 K/UL — SIGNIFICANT CHANGE UP (ref 0–0.5)
EOSINOPHIL NFR BLD AUTO: 0 % — SIGNIFICANT CHANGE UP (ref 0–6)
GLUCOSE BLDC GLUCOMTR-MCNC: 119 MG/DL — HIGH (ref 70–99)
GLUCOSE BLDC GLUCOMTR-MCNC: 94 MG/DL — SIGNIFICANT CHANGE UP (ref 70–99)
GLUCOSE BLDC GLUCOMTR-MCNC: 98 MG/DL — SIGNIFICANT CHANGE UP (ref 70–99)
GLUCOSE SERPL-MCNC: 91 MG/DL — SIGNIFICANT CHANGE UP (ref 70–99)
HCT VFR BLD CALC: 28.9 % — LOW (ref 39–50)
HGB BLD-MCNC: 8.9 G/DL — LOW (ref 13–17)
IMM GRANULOCYTES NFR BLD AUTO: 0.7 % — SIGNIFICANT CHANGE UP (ref 0–0.9)
LYMPHOCYTES # BLD AUTO: 1.91 K/UL — SIGNIFICANT CHANGE UP (ref 1–3.3)
LYMPHOCYTES # BLD AUTO: 18.6 % — SIGNIFICANT CHANGE UP (ref 13–44)
MAGNESIUM SERPL-MCNC: 1.7 MG/DL — SIGNIFICANT CHANGE UP (ref 1.6–2.6)
MCHC RBC-ENTMCNC: 27.4 PG — SIGNIFICANT CHANGE UP (ref 27–34)
MCHC RBC-ENTMCNC: 30.8 GM/DL — LOW (ref 32–36)
MCV RBC AUTO: 88.9 FL — SIGNIFICANT CHANGE UP (ref 80–100)
METHOD TYPE: SIGNIFICANT CHANGE UP
METHOD TYPE: SIGNIFICANT CHANGE UP
MONOCYTES # BLD AUTO: 0.77 K/UL — SIGNIFICANT CHANGE UP (ref 0–0.9)
MONOCYTES NFR BLD AUTO: 7.5 % — SIGNIFICANT CHANGE UP (ref 2–14)
NEUTROPHILS # BLD AUTO: 7.47 K/UL — HIGH (ref 1.8–7.4)
NEUTROPHILS NFR BLD AUTO: 72.7 % — SIGNIFICANT CHANGE UP (ref 43–77)
NRBC # BLD: 0 /100 WBCS — SIGNIFICANT CHANGE UP (ref 0–0)
ORGANISM # SPEC MICROSCOPIC CNT: SIGNIFICANT CHANGE UP
PHOSPHATE SERPL-MCNC: 2.9 MG/DL — SIGNIFICANT CHANGE UP (ref 2.5–4.5)
PLATELET # BLD AUTO: 316 K/UL — SIGNIFICANT CHANGE UP (ref 150–400)
POTASSIUM SERPL-MCNC: 3.9 MMOL/L — SIGNIFICANT CHANGE UP (ref 3.5–5.3)
POTASSIUM SERPL-SCNC: 3.9 MMOL/L — SIGNIFICANT CHANGE UP (ref 3.5–5.3)
PROT SERPL-MCNC: 6.4 G/DL — SIGNIFICANT CHANGE UP (ref 6–8.3)
RBC # BLD: 3.25 M/UL — LOW (ref 4.2–5.8)
RBC # FLD: 15.6 % — HIGH (ref 10.3–14.5)
SODIUM SERPL-SCNC: 145 MMOL/L — SIGNIFICANT CHANGE UP (ref 135–145)
SPECIMEN SOURCE: SIGNIFICANT CHANGE UP
WBC # BLD: 10.27 K/UL — SIGNIFICANT CHANGE UP (ref 3.8–10.5)
WBC # FLD AUTO: 10.27 K/UL — SIGNIFICANT CHANGE UP (ref 3.8–10.5)

## 2023-07-09 PROCEDURE — 99232 SBSQ HOSP IP/OBS MODERATE 35: CPT

## 2023-07-09 PROCEDURE — 99231 SBSQ HOSP IP/OBS SF/LOW 25: CPT

## 2023-07-09 RX ADMIN — Medication 25 MILLIGRAM(S): at 17:03

## 2023-07-09 RX ADMIN — FINASTERIDE 5 MILLIGRAM(S): 5 TABLET, FILM COATED ORAL at 11:26

## 2023-07-09 RX ADMIN — PANTOPRAZOLE SODIUM 40 MILLIGRAM(S): 20 TABLET, DELAYED RELEASE ORAL at 06:43

## 2023-07-09 RX ADMIN — Medication 3 MILLIGRAM(S): at 00:37

## 2023-07-09 RX ADMIN — Medication 650 MILLIGRAM(S): at 09:53

## 2023-07-09 RX ADMIN — SODIUM CHLORIDE 250 MILLILITER(S): 9 INJECTION, SOLUTION INTRAVENOUS at 05:34

## 2023-07-09 RX ADMIN — LINEZOLID 600 MILLIGRAM(S): 600 INJECTION, SOLUTION INTRAVENOUS at 17:03

## 2023-07-09 RX ADMIN — APIXABAN 5 MILLIGRAM(S): 2.5 TABLET, FILM COATED ORAL at 17:03

## 2023-07-09 RX ADMIN — Medication 25 MILLIGRAM(S): at 06:43

## 2023-07-09 RX ADMIN — Medication 180 MILLIGRAM(S): at 06:43

## 2023-07-09 RX ADMIN — LINEZOLID 600 MILLIGRAM(S): 600 INJECTION, SOLUTION INTRAVENOUS at 06:43

## 2023-07-09 RX ADMIN — APIXABAN 5 MILLIGRAM(S): 2.5 TABLET, FILM COATED ORAL at 06:43

## 2023-07-09 RX ADMIN — TAMSULOSIN HYDROCHLORIDE 0.4 MILLIGRAM(S): 0.4 CAPSULE ORAL at 22:18

## 2023-07-09 RX ADMIN — LATANOPROST 1 DROP(S): 0.05 SOLUTION/ DROPS OPHTHALMIC; TOPICAL at 22:29

## 2023-07-09 RX ADMIN — Medication 650 MILLIGRAM(S): at 10:50

## 2023-07-09 NOTE — PROGRESS NOTE ADULT - ATTENDING COMMENTS
Exam deferred due to isolation.    I: HTN controlled. Cr 2.56.   A: Improved ANIA.  P: Follow SCr. Cont IVF.

## 2023-07-09 NOTE — PROGRESS NOTE ADULT - ASSESSMENT
PCP: Dr. Robledo  73M w AF on Eliquis, morbid obesity, prostate ca s/p brachytherapy, XRT, chronic Monterroso, p/w ANIA on CKD 2/2 b/l hydronephrosis, failed ureteral stent placement 2/2 bladder obstruction s/p b/l PCN 7/6--R nephrostomy tube with growth of MRSA     #ANIA on CKD – Cr Continues to improve - 2.56 from 3.83 from 4.37. likely obstructive. failured ureteral stent placement and now s/p b/l percutaneous nephrostomy tubes on 7/6    #MRSA UTI   7/6 - UCx MRSA – linezolid 600 q12 x6 thru 7/13    #Anemia – 8.9 from 9.1 from 9.4. TSat 13  #AF – Eliquis 5 BID, dilt 180. TTE – LVEF 65  #BPH – finasteride, oxybutynin, flomax    Plan  Overall Renal function continues to improve.  Clinically appears euvolemic, however intake is not being tracked consistently. Reports -7.4L output; +80/hr IVF = ~2L/day still significantly net negative. Thus please obtain daily standing weights.     Noted Nephrology recs - 40cc/hr NS  BMP in AM    c/w Linezolid    DISPO: Home w HPT pending improvement in post-obstructive diuresis and being off IV Fluids

## 2023-07-09 NOTE — PROGRESS NOTE ADULT - ASSESSMENT
*******incomplete**********      72 yo M w/ no known renal disease, admitted for ANIA Cr 4.2 with workup revealing b/l mod hydro, s/p unsuccessful attempt to place bilateral ureteral stents 7/5, then s/p b/l PCN placement by IR 7/6. Now / Cr downtrending, at 3.8, today after peak of 5.3 7/6. Pt also w/ post-obstructive diuresis ~5.5L UO/24 hours    #Non-oliguric ANIA due to post-renal obstruction  BCr not known, but reportedly normal, will confirm  UA w/ hematuria, proteinuria (UPCR 1.7). Hematuria due to portillo likely. Will need to follow the proteinuria, but at this time acute GN process unlikely  Liza 45  US w/ bilateral mod hydro, Rt 14.3cm, Lt 14.1cm, b/l increased echogenicity, decompressed bladder      Recommend:  Given post-ATN diuresis and increase in UO as well as pt tachycardic, increase 0.45% NaCl to 80ml/hr continuously. Can also bolus 250ml 0.45%saline now as drop in intravascular vol can be a trigger for his afib  Will reassess tomorrow if can decrease IV fluids and assess UO if still in post obstructive diuresis  Lytes and vol status stable, no indication of HD  Avoid nephrotoxic meds, ACEi/ARBs, NSAIDs. Dose meds for eGFR<15  BMP daily  Appreciate urology input 72 yo M w/ no known renal disease, admitted for ANIA Cr 4.2 with workup revealing b/l mod hydro, s/p unsuccessful attempt to place bilateral ureteral stents 7/5, then s/p b/l PCN placement by IR 7/6. Now / Cr downtrending, at 3.8, today after peak of 5.3 7/6. Pt also w/ post-obstructive diuresis ~5.5L UO/24 hours    #Non-oliguric ANIA due to post-renal obstruction  BCr not known, but reportedly normal  UA w/ hematuria, proteinuria (UPCR 1.7). Hematuria due to portillo likely. Will need to follow the proteinuria, but at this time acute GN process unlikely  Liza 45  US w/ bilateral mod hydro, Rt 14.3cm, Lt 14.1cm, b/l increased echogenicity, decompressed bladder    Recommend:  Given post-ATN diuresis, c/w IV fluids (0.45%saline) but decrease to 40ml/hr to assess if UO decreases. If with this decrease, pt becomes hypotensive, or tachycardic, then increase back to 80 ml/hr  Will reassess tomorrow if can decrease IV fluids and assess UO if still in post obstructive diuresis  Lytes and vol status stable, no indication of HD  Avoid nephrotoxic meds, ACEi/ARBs, NSAIDs. Dose meds for eGFR<15  BMP daily  Appreciate urology input

## 2023-07-10 ENCOUNTER — TRANSCRIPTION ENCOUNTER (OUTPATIENT)
Age: 73
End: 2023-07-10

## 2023-07-10 VITALS
RESPIRATION RATE: 18 BRPM | TEMPERATURE: 98 F | DIASTOLIC BLOOD PRESSURE: 80 MMHG | OXYGEN SATURATION: 97 % | SYSTOLIC BLOOD PRESSURE: 125 MMHG | HEART RATE: 102 BPM

## 2023-07-10 LAB
ANION GAP SERPL CALC-SCNC: 10 MMOL/L — SIGNIFICANT CHANGE UP (ref 5–17)
BUN SERPL-MCNC: 30 MG/DL — HIGH (ref 7–23)
CALCIUM SERPL-MCNC: 9.7 MG/DL — SIGNIFICANT CHANGE UP (ref 8.4–10.5)
CHLORIDE SERPL-SCNC: 112 MMOL/L — HIGH (ref 96–108)
CO2 SERPL-SCNC: 22 MMOL/L — SIGNIFICANT CHANGE UP (ref 22–31)
CREAT SERPL-MCNC: 2.09 MG/DL — HIGH (ref 0.5–1.3)
EGFR: 32 ML/MIN/1.73M2 — LOW
GLUCOSE BLDC GLUCOMTR-MCNC: 90 MG/DL — SIGNIFICANT CHANGE UP (ref 70–99)
GLUCOSE SERPL-MCNC: 96 MG/DL — SIGNIFICANT CHANGE UP (ref 70–99)
HCT VFR BLD CALC: 29.2 % — LOW (ref 39–50)
HCT VFR BLD CALC: 30.5 % — LOW (ref 39–50)
HGB BLD-MCNC: 9 G/DL — LOW (ref 13–17)
HGB BLD-MCNC: 9.5 G/DL — LOW (ref 13–17)
MAGNESIUM SERPL-MCNC: 1.5 MG/DL — LOW (ref 1.6–2.6)
MCHC RBC-ENTMCNC: 27.2 PG — SIGNIFICANT CHANGE UP (ref 27–34)
MCHC RBC-ENTMCNC: 27.5 PG — SIGNIFICANT CHANGE UP (ref 27–34)
MCHC RBC-ENTMCNC: 30.8 GM/DL — LOW (ref 32–36)
MCHC RBC-ENTMCNC: 31.1 GM/DL — LOW (ref 32–36)
MCV RBC AUTO: 88.2 FL — SIGNIFICANT CHANGE UP (ref 80–100)
MCV RBC AUTO: 88.2 FL — SIGNIFICANT CHANGE UP (ref 80–100)
NRBC # BLD: 0 /100 WBCS — SIGNIFICANT CHANGE UP (ref 0–0)
NRBC # BLD: 0 /100 WBCS — SIGNIFICANT CHANGE UP (ref 0–0)
PHOSPHATE SERPL-MCNC: 2.6 MG/DL — SIGNIFICANT CHANGE UP (ref 2.5–4.5)
PLATELET # BLD AUTO: 297 K/UL — SIGNIFICANT CHANGE UP (ref 150–400)
PLATELET # BLD AUTO: 318 K/UL — SIGNIFICANT CHANGE UP (ref 150–400)
POTASSIUM SERPL-MCNC: 3.6 MMOL/L — SIGNIFICANT CHANGE UP (ref 3.5–5.3)
POTASSIUM SERPL-SCNC: 3.6 MMOL/L — SIGNIFICANT CHANGE UP (ref 3.5–5.3)
RBC # BLD: 3.31 M/UL — LOW (ref 4.2–5.8)
RBC # BLD: 3.46 M/UL — LOW (ref 4.2–5.8)
RBC # FLD: 15 % — HIGH (ref 10.3–14.5)
RBC # FLD: 15.1 % — HIGH (ref 10.3–14.5)
SODIUM SERPL-SCNC: 144 MMOL/L — SIGNIFICANT CHANGE UP (ref 135–145)
WBC # BLD: 10.48 K/UL — SIGNIFICANT CHANGE UP (ref 3.8–10.5)
WBC # BLD: 10.85 K/UL — HIGH (ref 3.8–10.5)
WBC # FLD AUTO: 10.48 K/UL — SIGNIFICANT CHANGE UP (ref 3.8–10.5)
WBC # FLD AUTO: 10.85 K/UL — HIGH (ref 3.8–10.5)

## 2023-07-10 PROCEDURE — 86901 BLOOD TYPING SEROLOGIC RH(D): CPT

## 2023-07-10 PROCEDURE — 86850 RBC ANTIBODY SCREEN: CPT

## 2023-07-10 PROCEDURE — 82043 UR ALBUMIN QUANTITATIVE: CPT

## 2023-07-10 PROCEDURE — 50432 PLMT NEPHROSTOMY CATHETER: CPT

## 2023-07-10 PROCEDURE — 87181 SC STD AGAR DILUTION PER AGT: CPT

## 2023-07-10 PROCEDURE — 86038 ANTINUCLEAR ANTIBODIES: CPT

## 2023-07-10 PROCEDURE — 86900 BLOOD TYPING SEROLOGIC ABO: CPT

## 2023-07-10 PROCEDURE — 87340 HEPATITIS B SURFACE AG IA: CPT

## 2023-07-10 PROCEDURE — 85027 COMPLETE CBC AUTOMATED: CPT

## 2023-07-10 PROCEDURE — 83550 IRON BINDING TEST: CPT

## 2023-07-10 PROCEDURE — 36415 COLL VENOUS BLD VENIPUNCTURE: CPT

## 2023-07-10 PROCEDURE — 82962 GLUCOSE BLOOD TEST: CPT

## 2023-07-10 PROCEDURE — 86709 HEPATITIS A IGM ANTIBODY: CPT

## 2023-07-10 PROCEDURE — 80053 COMPREHEN METABOLIC PANEL: CPT

## 2023-07-10 PROCEDURE — 84466 ASSAY OF TRANSFERRIN: CPT

## 2023-07-10 PROCEDURE — 86225 DNA ANTIBODY NATIVE: CPT

## 2023-07-10 PROCEDURE — 99232 SBSQ HOSP IP/OBS MODERATE 35: CPT

## 2023-07-10 PROCEDURE — 84132 ASSAY OF SERUM POTASSIUM: CPT

## 2023-07-10 PROCEDURE — 80048 BASIC METABOLIC PNL TOTAL CA: CPT

## 2023-07-10 PROCEDURE — 87040 BLOOD CULTURE FOR BACTERIA: CPT

## 2023-07-10 PROCEDURE — C8929: CPT

## 2023-07-10 PROCEDURE — 97530 THERAPEUTIC ACTIVITIES: CPT

## 2023-07-10 PROCEDURE — 83516 IMMUNOASSAY NONANTIBODY: CPT

## 2023-07-10 PROCEDURE — 84295 ASSAY OF SERUM SODIUM: CPT

## 2023-07-10 PROCEDURE — 84156 ASSAY OF PROTEIN URINE: CPT

## 2023-07-10 PROCEDURE — 86803 HEPATITIS C AB TEST: CPT

## 2023-07-10 PROCEDURE — 97116 GAIT TRAINING THERAPY: CPT

## 2023-07-10 PROCEDURE — 99285 EMERGENCY DEPT VISIT HI MDM: CPT | Mod: 25

## 2023-07-10 PROCEDURE — 86706 HEP B SURFACE ANTIBODY: CPT

## 2023-07-10 PROCEDURE — 85025 COMPLETE CBC W/AUTO DIFF WBC: CPT

## 2023-07-10 PROCEDURE — 71045 X-RAY EXAM CHEST 1 VIEW: CPT

## 2023-07-10 PROCEDURE — 82728 ASSAY OF FERRITIN: CPT

## 2023-07-10 PROCEDURE — G0103: CPT

## 2023-07-10 PROCEDURE — 85730 THROMBOPLASTIN TIME PARTIAL: CPT

## 2023-07-10 PROCEDURE — 86160 COMPLEMENT ANTIGEN: CPT

## 2023-07-10 PROCEDURE — 86705 HEP B CORE ANTIBODY IGM: CPT

## 2023-07-10 PROCEDURE — C1729: CPT

## 2023-07-10 PROCEDURE — 84300 ASSAY OF URINE SODIUM: CPT

## 2023-07-10 PROCEDURE — 82803 BLOOD GASES ANY COMBINATION: CPT

## 2023-07-10 PROCEDURE — C1769: CPT

## 2023-07-10 PROCEDURE — 76770 US EXAM ABDO BACK WALL COMP: CPT

## 2023-07-10 PROCEDURE — 97161 PT EVAL LOW COMPLEX 20 MIN: CPT

## 2023-07-10 PROCEDURE — 82570 ASSAY OF URINE CREATININE: CPT

## 2023-07-10 PROCEDURE — 74176 CT ABD & PELVIS W/O CONTRAST: CPT

## 2023-07-10 PROCEDURE — 86036 ANCA SCREEN EACH ANTIBODY: CPT

## 2023-07-10 PROCEDURE — 84100 ASSAY OF PHOSPHORUS: CPT

## 2023-07-10 PROCEDURE — 86334 IMMUNOFIX E-PHORESIS SERUM: CPT

## 2023-07-10 PROCEDURE — 83605 ASSAY OF LACTIC ACID: CPT

## 2023-07-10 PROCEDURE — 84540 ASSAY OF URINE/UREA-N: CPT

## 2023-07-10 PROCEDURE — 81001 URINALYSIS AUTO W/SCOPE: CPT

## 2023-07-10 PROCEDURE — 87086 URINE CULTURE/COLONY COUNT: CPT

## 2023-07-10 PROCEDURE — 85610 PROTHROMBIN TIME: CPT

## 2023-07-10 PROCEDURE — 96374 THER/PROPH/DIAG INJ IV PUSH: CPT

## 2023-07-10 PROCEDURE — 86255 FLUORESCENT ANTIBODY SCREEN: CPT

## 2023-07-10 PROCEDURE — 93005 ELECTROCARDIOGRAM TRACING: CPT

## 2023-07-10 PROCEDURE — 83540 ASSAY OF IRON: CPT

## 2023-07-10 PROCEDURE — C1758: CPT

## 2023-07-10 PROCEDURE — 87186 SC STD MICRODIL/AGAR DIL: CPT

## 2023-07-10 PROCEDURE — 86704 HEP B CORE ANTIBODY TOTAL: CPT

## 2023-07-10 PROCEDURE — 83521 IG LIGHT CHAINS FREE EACH: CPT

## 2023-07-10 PROCEDURE — 83935 ASSAY OF URINE OSMOLALITY: CPT

## 2023-07-10 PROCEDURE — 82330 ASSAY OF CALCIUM: CPT

## 2023-07-10 PROCEDURE — 83735 ASSAY OF MAGNESIUM: CPT

## 2023-07-10 PROCEDURE — 99233 SBSQ HOSP IP/OBS HIGH 50: CPT | Mod: GC

## 2023-07-10 RX ORDER — POTASSIUM CHLORIDE 20 MEQ
20 PACKET (EA) ORAL ONCE
Refills: 0 | Status: COMPLETED | OUTPATIENT
Start: 2023-07-10 | End: 2023-07-10

## 2023-07-10 RX ORDER — LINEZOLID 600 MG/300ML
1 INJECTION, SOLUTION INTRAVENOUS
Qty: 6 | Refills: 0
Start: 2023-07-10 | End: 2023-07-12

## 2023-07-10 RX ORDER — METOPROLOL TARTRATE 50 MG
1 TABLET ORAL
Qty: 30 | Refills: 0
Start: 2023-07-10 | End: 2023-08-08

## 2023-07-10 RX ORDER — DILTIAZEM HCL 120 MG
1 CAPSULE, EXT RELEASE 24 HR ORAL
Qty: 0 | Refills: 0 | DISCHARGE
Start: 2023-07-10

## 2023-07-10 RX ORDER — MAGNESIUM SULFATE 500 MG/ML
2 VIAL (ML) INJECTION ONCE
Refills: 0 | Status: COMPLETED | OUTPATIENT
Start: 2023-07-10 | End: 2023-07-10

## 2023-07-10 RX ORDER — FINASTERIDE 5 MG/1
1 TABLET, FILM COATED ORAL
Qty: 0 | Refills: 0 | DISCHARGE
Start: 2023-07-10

## 2023-07-10 RX ORDER — OXYBUTYNIN CHLORIDE 5 MG
1 TABLET ORAL
Qty: 0 | Refills: 0 | DISCHARGE
Start: 2023-07-10

## 2023-07-10 RX ORDER — METOPROLOL TARTRATE 50 MG
50 TABLET ORAL
Refills: 0 | Status: DISCONTINUED | OUTPATIENT
Start: 2023-07-10 | End: 2023-07-10

## 2023-07-10 RX ORDER — ASPIRIN/CALCIUM CARB/MAGNESIUM 324 MG
1 TABLET ORAL
Qty: 0 | Refills: 0 | DISCHARGE
Start: 2023-07-10

## 2023-07-10 RX ORDER — METOPROLOL TARTRATE 50 MG
1 TABLET ORAL
Qty: 0 | Refills: 0 | DISCHARGE
Start: 2023-07-10

## 2023-07-10 RX ORDER — ASPIRIN/CALCIUM CARB/MAGNESIUM 324 MG
81 TABLET ORAL DAILY
Refills: 0 | Status: DISCONTINUED | OUTPATIENT
Start: 2023-07-10 | End: 2023-07-10

## 2023-07-10 RX ORDER — CALCIUM CARBONATE 500(1250)
2 TABLET ORAL ONCE
Refills: 0 | Status: COMPLETED | OUTPATIENT
Start: 2023-07-10 | End: 2023-07-10

## 2023-07-10 RX ORDER — TAMSULOSIN HYDROCHLORIDE 0.4 MG/1
1 CAPSULE ORAL
Qty: 0 | Refills: 0 | DISCHARGE
Start: 2023-07-10

## 2023-07-10 RX ADMIN — APIXABAN 5 MILLIGRAM(S): 2.5 TABLET, FILM COATED ORAL at 06:21

## 2023-07-10 RX ADMIN — Medication 180 MILLIGRAM(S): at 06:23

## 2023-07-10 RX ADMIN — Medication 25 MILLIGRAM(S): at 06:21

## 2023-07-10 RX ADMIN — Medication 81 MILLIGRAM(S): at 18:45

## 2023-07-10 RX ADMIN — Medication 25 GRAM(S): at 13:11

## 2023-07-10 RX ADMIN — Medication 50 MILLIGRAM(S): at 18:46

## 2023-07-10 RX ADMIN — Medication 2 TABLET(S): at 01:16

## 2023-07-10 RX ADMIN — PANTOPRAZOLE SODIUM 40 MILLIGRAM(S): 20 TABLET, DELAYED RELEASE ORAL at 06:21

## 2023-07-10 RX ADMIN — Medication 20 MILLIEQUIVALENT(S): at 13:11

## 2023-07-10 RX ADMIN — LINEZOLID 600 MILLIGRAM(S): 600 INJECTION, SOLUTION INTRAVENOUS at 06:21

## 2023-07-10 RX ADMIN — LINEZOLID 600 MILLIGRAM(S): 600 INJECTION, SOLUTION INTRAVENOUS at 18:45

## 2023-07-10 RX ADMIN — FINASTERIDE 5 MILLIGRAM(S): 5 TABLET, FILM COATED ORAL at 11:14

## 2023-07-10 NOTE — PROGRESS NOTE ADULT - ASSESSMENT
72M somewhat poor historian PMH Afib (not on AC), HTN, GERD, prostate cancer s/p brachytherapy, neurogenic bladder managed with chronic indwelling portillo admitted to medicine for UTI, ANIA. Urology consulted for moderate bilateral hydronephrosis on US i/s/o ANIA. FeNa 5.8% - suggestive of post-obstructive ANIA. Cr 4.68 (baseline 0.92). 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 S/p cystoscopy, attempted bilateral ureteral stent placement 7/5 however was unsuccessful. Now s/p b/l PCN placement by IR 7/6. UCx - MRSA.    Recommendations:  -Please obtain PSA  -Continue portillo for now. Long term plan regarding PCNs - If minimal output from Portillo and patient continues to tolerate PCNs/prefers PCNs to Portillo, can plan to remove Portillo (with intermittent catheterization prn by home nurse). If not tolerating PCNs, can ask IR to internalize stents and continue Portillo.   -Please have patient follow up with Dr. Robledo on discharge  -Broad spectrum IV abx for treatment of UTI per primary team  -Appreciate nephrology recommendations  -Appreciate excellent care per primary team  -Discussed with attending 72M somewhat poor historian PMH Afib (not on AC), HTN, GERD, prostate cancer s/p brachytherapy, neurogenic bladder managed with chronic indwelling portillo admitted to medicine for UTI, ANIA. Urology consulted for moderate bilateral hydronephrosis on US i/s/o ANIA. FeNa 5.8% - suggestive of post-obstructive ANIA. Cr 4.68 (baseline 0.92). 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 S/p cystoscopy, attempted bilateral ureteral stent placement 7/5 however was unsuccessful. Now s/p b/l PCN placement by IR 7/6. UCx - MRSA.    Recommendations:  -Continue portillo for now. Long term plan regarding PCNs - If minimal output from Portillo and patient continues to tolerate PCNs/prefers PCNs to Portillo, can plan to remove Portillo (with intermittent catheterization prn by home nurse). If not tolerating PCNs, can ask IR to internalize stents and continue Portillo.   -Please have patient follow up with Dr. Robledo on discharge  -Broad spectrum IV abx for treatment of UTI per primary team  -Appreciate nephrology recommendations  -Appreciate excellent care per primary team  -Discussed with attending

## 2023-07-10 NOTE — PROGRESS NOTE ADULT - NS ATTEST RISK PROBLEM GEN_ALL_CORE FT
#UTI  #ANIA  #B/l hydronephrosis  #Afib not on AC 2/2 hx of bleeding  #Chronic indwelling portillo   #Prostate cancer s/p brachytherapy
#UTI  #ANIA on ckd   #hematuria   #B/l hydronephrosis  #Afib not on AC 2/2 hx of bleeding  #Chronic indwelling portillo   #Prostate cancer s/p brachytherapy

## 2023-07-10 NOTE — PROGRESS NOTE ADULT - PROVIDER SPECIALTY LIST ADULT
Hospitalist
Hospitalist
Internal Medicine
Urology
Urology
Internal Medicine
Nephrology
Urology
Urology
Nephrology
Rehab Medicine
Urology

## 2023-07-10 NOTE — DISCHARGE NOTE NURSING/CASE MANAGEMENT/SOCIAL WORK - NSDCFUADDAPPT_GEN_ALL_CORE_FT
Please bring your Insurance card, Photo ID and Discharge paperwork to the following appointments:    (1) Please follow up with your Urology Provider, Dr. Josh Robledo at [address] [place], [address] [place] [address] on 07/11/2023 at 1:10pm.    Appointment was scheduled by Ms. KALEY Jorgensen, Referral Coordinator.    (2) Please follow up with your Family Medicine Provider, Dr. Tequila shankar [address] [place] [address] on 07/14/2023 at 12:00pm.    Appointment was scheduled by Ms. KALEY Jorgensen, Referral Coordinator.    (3) Please follow up with your Nephrology Provider, Dr. Mike Panda at [address] [place] [address], [address] [address] on 08/15/2023 at 4:30pm.    Please note that the office will contact you for an earlier appointment once available.    Appointment was scheduled by Ms. KALEY Jorgensen, Referral Coordinator.

## 2023-07-10 NOTE — PROGRESS NOTE ADULT - SUBJECTIVE AND OBJECTIVE BOX
Physical Medicine and Rehabilitation Progress Note :       Patient is a 73y old  Male who presents with a chief complaint of Acute ANIA in the setting of bilateral ureteral obstruction (06 Jul 2023 15:13)      HPI:  72M somewhat poor historian PMH Afib (not on AC), HTN, GERD,  prostate cancer s/p brachytherapy, chronic indwelling portillo presents for fever of 101F at home and abnormal labs. He reports going to his GI for abdominal pain 4 days ago and was informed about Hgb 8.3 and BUN/Cr 75/3.72. He does not know his baseline kidney function but reports it being abnormal previously. Reports poor PO intake over the last week. Portillo last changed 4 weeks ago. Denies n/v, diarrhea, current abd pain.     In the ED, VSS.   Given CTX 1g, 1L NS. (04 Jul 2023 02:12)                            9.4    16.38 )-----------( 311      ( 07 Jul 2023 05:30 )             30.2       07-07    138  |  110<H>  |  69<H>  ----------------------------<  135<H>  5.2   |  20<L>  |  4.66<H>    Ca    9.9      07 Jul 2023 05:30  Phos  3.8     07-07  Mg     1.8     07-07    TPro  6.5  /  Alb  2.9<L>  /  TBili  0.2  /  DBili  x   /  AST  13  /  ALT  10  /  AlkPhos  71  07-07    Vital Signs Last 24 Hrs  T(C): 36.6 (07 Jul 2023 05:33), Max: 36.6 (07 Jul 2023 05:33)  T(F): 97.8 (07 Jul 2023 05:33), Max: 97.8 (07 Jul 2023 05:33)  HR: 97 (07 Jul 2023 05:33) (89 - 97)  BP: 120/80 (07 Jul 2023 05:33) (101/63 - 120/80)  BP(mean): 81 (06 Jul 2023 16:51) (81 - 81)  RR: 18 (07 Jul 2023 05:33) (17 - 19)  SpO2: 99% (07 Jul 2023 05:33) (96% - 99%)    Parameters below as of 07 Jul 2023 05:33  Patient On (Oxygen Delivery Method): room air        MEDICATIONS  (STANDING):  diltiazem    milliGRAM(s) Oral every 24 hours  finasteride 5 milliGRAM(s) Oral daily  latanoprost 0.005% Ophthalmic Solution 1 Drop(s) Both EYES at bedtime  pantoprazole    Tablet 40 milliGRAM(s) Oral before breakfast  piperacillin/tazobactam IVPB.. 4.5 Gram(s) IV Intermittent every 8 hours  polyethylene glycol 3350 17 Gram(s) Oral daily  senna 2 Tablet(s) Oral at bedtime  sodium chloride 0.45%. 1000 milliLiter(s) (60 mL/Hr) IV Continuous <Continuous>  tamsulosin 0.4 milliGRAM(s) Oral at bedtime    MEDICATIONS  (PRN):  acetaminophen     Tablet .. 650 milliGRAM(s) Oral every 6 hours PRN Temp greater or equal to 38C (100.4F), Mild Pain (1 - 3)  melatonin 3 milliGRAM(s) Oral at bedtime PRN Insomnia  oxybutynin 5 milliGRAM(s) Oral every 8 hours PRN Bladder spasms        Initial Functional Status Assessment :       Previous Level of Function:     · Ambulation Skills	independent  · Transfer Skills	independent  · ADL Skills	independent  · Work/Leisure Activity	independent  · Additional Comments	Pt reports living in an elevator building with wife. Children (son and daughter) live close by to help with support. Pt also reports having HHA but unable to state the amount and the extent of help. Pt denies having RW or SC but does report having shower chair, grab bars, and raised toilet seat.    Cognitive Status Examination:   · Orientation	oriented to person, place, time and situation  · Level of Consciousness	confused; alert  · Follows Commands and Answers Questions	75% of the time; req constant redirection and repetition of cues  · Personal Safety and Judgment	impaired; at risk behaviors demonstrated; impulsive  · Short Term Memory	impaired    Range of Motion Exam:   · Range of Motion Examination	bilateral lower extremity ROM was WFL (within functional limits)    Manual Muscle Testing:   · Manual Muscle Testing Results	no strength deficits were identified    Bed Mobility: Sit to Supine:     · Level of [place]	minimum assist (75% patients effort)  · Physical Assist/Nonphysical Assist	1 person assist    Bed Mobility: Supine to Sit:     · Level of [place]	minimum assist (75% patients effort)  · Physical Assist/Nonphysical Assist	1 person assist    Transfer: Sit to Stand:     · Level of [place]	minimum assist (75% patients effort)  · Physical Assist/Nonphysical Assist	1 person assist  · Weight-Bearing Restrictions	weight-bearing as tolerated  · Assistive Device	rolling walker    Transfer: Stand to Sit:     · Level of [place]	minimum assist (75% patients effort)  · Physical Assist/Nonphysical Assist	1 person assist  · Weight-Bearing Restrictions	weight-bearing as tolerated  · Assistive Device	rolling walker    Gait Skills:     · Level of [place]	contact guard; minimum assist (75% patients effort)  · Physical Assist/Nonphysical Assist	1 person assist  · Assistive Device	rolling walker  · Gait Distance	10 feet; x2    Gait Analysis:     · Gait Pattern Used	3-point gait  · Gait Deviations Noted	decreased step length; decreased stride length; decreased weight-shifting ability; decreased joe  · Impairments Contributing to Gait Deviations	cognition; impaired postural control; decreased strength    Stair Negotiation:     · Level of [place]	unable to perform    Balance Skills Assessment:     · Sitting Balance: Static	good balance  · Sitting Balance: Dynamic	fair balance  · Standing Balance: Static	good balance  · Standing Balance: Dynamic	fair balance  · Systems Impairment Contributing to Balance Disturbance	musculoskeletal; neuromuscular  · Identified Impairments Contributing to Balance Disturbance	decreased strength; impaired motor control    Sensory Examination:   Sensory Examination:    Grossly Intact:   · Gross Sensory Examination	Grossly Intact      Clinical Impressions:   · Criteria for Skilled Therapeutic Interventions	rehab potential; impairments found; anticipated equipment needs at discharge; therapy frequency; functional limitations in following categories; anticipated discharge recommendation; risk reduction/prevention; predicted duration of therapy intervention  · Impairments Found (describe specific impairments)	aerobic capacity/endurance; gait, locomotion, and balance; muscle strength; posture; gross motor; ergonomics and body mechanics; arousal, attention, and cognition; poor safety awareness  · Functional Limitations in Following Categories (describe specific limitations)	self-care; home management; community/leisure  · Risk Reduction/Prevention (Describe Specific Areas of risk reduction/prevention)	risk factors  · Risk Areas	fall; impaired judgment; safety awareness            PM&R Impression : as above    Current Disposition Plan Recommendations :    d/c home with home physical therapy          
  UROLOGY POST OP NOTE (PAGER # [phone])    PROCEDURE: cystoscoy, attempted bilateral ureteral stents    T(C): 36.1 (07-05-23 @ 18:02), Max: 36.6 (07-05-23 @ 10:08)  HR: 84 (07-05-23 @ 20:40) (84 - 110)  BP: 127/75 (07-05-23 @ 20:40) (106/58 - 141/81)  RR: 21 (07-05-23 @ 20:40) (17 - 31)  SpO2: 100% (07-05-23 @ 20:40) (94% - 100%)  Wt(kg): --  UO: CBI    SUBJECTIVE: pain controlled. No N/V. No CP/SOB.    ON PE: alert and awake    Abdomen: soft, NT, ND    : CBI/FC intact, urine light pink                          8.1    8.18  )-----------( 276      ( 05 Jul 2023 06:07 )             25.9     07-05    134<L>  |  104  |  71<H>  ----------------------------<  98  4.6   |  18<L>  |  4.68<H>    Ca    9.5      05 Jul 2023 06:07  Phos  3.8     07-05  Mg     1.8     07-05    TPro  6.1  /  Alb  2.8<L>  /  TBili  0.3  /  DBili  x   /  AST  13  /  ALT  10  /  AlkPhos  72  07-05       
Patient is a 73y Male seen and evaluated at bedside. Laying comfortably in bed. Denies any symptoms.       Meds:    acetaminophen     Tablet .. 650 every 6 hours PRN  diltiazem    every 24 hours  finasteride 5 daily  latanoprost 0.005% Ophthalmic Solution 1 at bedtime  melatonin 3 at bedtime PRN  oxybutynin 5 every 8 hours PRN  pantoprazole    Tablet 40 before breakfast  piperacillin/tazobactam IVPB.. 4.5 every 8 hours  polyethylene glycol 3350 17 daily  senna 2 at bedtime  sodium chloride 0.45%. 1000 <Continuous>  tamsulosin 0.4 at bedtime      T(C): , Max: 36.6 (07-07-23 @ 05:33)  T(F): , Max: 97.8 (07-07-23 @ 05:33)  HR: 97 (07-07-23 @ 05:33)  BP: 120/80 (07-07-23 @ 05:33)  BP(mean): 81 (07-06-23 @ 16:51)  RR: 18 (07-07-23 @ 05:33)  SpO2: 99% (07-07-23 @ 05:33)  Wt(kg): --    07-06 @ 07:01  -  07-07 @ 07:00  --------------------------------------------------------  IN: 0 mL / OUT: 3675 mL / NET: -3675 mL    07-07 @ 07:01  -  07-07 @ 11:19  --------------------------------------------------------  IN: 0 mL / OUT: 125 mL / NET: -125 mL          Review of Systems:  ROS negative except as per HPI      PHYSICAL EXAM:  GENERAL: Alert, awake, NAD  CHEST/LUNG: Bilateral clear breath sounds  HEART: Regular rate and rhythm, no murmur  ABDOMEN: Soft, nontender, non distended  : Monterroso in place, draining urine. B/L nephrostomies  EXTREMITIES: no edema  Neurology: AAOx3, no focal neurological deficit    LABS:                        9.4    16.38 )-----------( 311      ( 07 Jul 2023 05:30 )             30.2     07-07    138  |  110<H>  |  69<H>  ----------------------------<  135<H>  5.2   |  20<L>  |  4.66<H>    Ca    9.9      07 Jul 2023 05:30  Phos  3.8     07-07  Mg     1.8     07-07    TPro  6.5  /  Alb  2.9<L>  /  TBili  0.2  /  DBili  x   /  AST  13  /  ALT  10  /  AlkPhos  71  07-07      PT/INR - ( 06 Jul 2023 07:31 )   PT: 15.3 sec;   INR: 1.28          PTT - ( 06 Jul 2023 07:31 )  PTT:31.1 sec  Urinalysis Basic - ( 07 Jul 2023 05:30 )    Color: x / Appearance: x / SG: x / pH: x  Gluc: 135 mg/dL / Ketone: x  / Bili: x / Urobili: x   Blood: x / Protein: x / Nitrite: x   Leuk Esterase: x / RBC: x / WBC x   Sq Epi: x / Non Sq Epi: x / Bacteria: x            RADIOLOGY & ADDITIONAL STUDIES:          
Patient is a 73y Male seen and evaluated at bedside. Laying comfortably in bed. Got b/l PCN today by IR.      Meds:    acetaminophen     Tablet .. 650 every 6 hours PRN  diltiazem    every 24 hours  finasteride 5 daily  latanoprost 0.005% Ophthalmic Solution 1 at bedtime  melatonin 3 at bedtime PRN  oxybutynin 5 every 8 hours PRN  pantoprazole    Tablet 40 before breakfast  piperacillin/tazobactam IVPB.. 4.5 every 8 hours  polyethylene glycol 3350 17 daily  senna 2 at bedtime  tamsulosin 0.4 at bedtime      T(C): , Max: 36.7 (07-06-23 @ 08:41)  T(F): , Max: 98.1 (07-06-23 @ 08:41)  HR: 83 (07-06-23 @ 08:41)  BP: 122/75 (07-06-23 @ 08:41)  BP(mean): 94 (07-05-23 @ 20:40)  RR: 16 (07-06-23 @ 08:41)  SpO2: 97% (07-06-23 @ 08:41)  Wt(kg): --    07-05 @ 07:01  -  07-06 @ 07:00  --------------------------------------------------------  IN: 0 mL / OUT: 350 mL / NET: -350 mL    07-06 @ 07:01  -  07-06 @ 15:15  --------------------------------------------------------  IN: 0 mL / OUT: 1300 mL / NET: -1300 mL          Review of Systems:  ROS negative except as per HPI      PHYSICAL EXAM:  GENERAL: Alert, awake, NAD  CHEST/LUNG: Bilateral clear breath sounds  HEART: Regular rate and rhythm, no murmur  ABDOMEN: Soft, nontender, non distended  : Monterroso in place, draining urine  EXTREMITIES: no edema  Neurology: AAOx3, no focal neurological deficit      LABS:                        9.3    10.77 )-----------( 299      ( 06 Jul 2023 08:32 )             30.1     07-06    132<L>  |  102  |  76<H>  ----------------------------<  264<H>  5.0   |  17<L>  |  5.33<H>    Ca    8.9      06 Jul 2023 05:30  Phos  5.2     07-06  Mg     1.8     07-06    TPro  5.8<L>  /  Alb  2.5<L>  /  TBili  0.2  /  DBili  x   /  AST  11  /  ALT  8<L>  /  AlkPhos  67  07-06      PT/INR - ( 06 Jul 2023 07:31 )   PT: 15.3 sec;   INR: 1.28          PTT - ( 06 Jul 2023 07:31 )  PTT:31.1 sec  Urinalysis Basic - ( 06 Jul 2023 05:30 )    Color: x / Appearance: x / SG: x / pH: x  Gluc: 264 mg/dL / Ketone: x  / Bili: x / Urobili: x   Blood: x / Protein: x / Nitrite: x   Leuk Esterase: x / RBC: x / WBC x   Sq Epi: x / Non Sq Epi: x / Bacteria: x            RADIOLOGY & ADDITIONAL STUDIES:          
UROLOGY PROGRESS NOTE    SUBJECTIVE: Patient seen and examined bedside. Patient denies acute complaints, no fevers/chills, or abd/flank pain.     diltiazem    milliGRAM(s) Oral every 24 hours  piperacillin/tazobactam IVPB.. 4.5 Gram(s) IV Intermittent every 8 hours      Vital Signs Last 24 Hrs  T(C): 36.6 (07 Jul 2023 05:33), Max: 36.7 (06 Jul 2023 08:41)  T(F): 97.8 (07 Jul 2023 05:33), Max: 98.1 (06 Jul 2023 08:41)  HR: 97 (07 Jul 2023 05:33) (83 - 97)  BP: 120/80 (07 Jul 2023 05:33) (101/63 - 122/75)  BP(mean): 81 (06 Jul 2023 16:51) (81 - 81)  RR: 18 (07 Jul 2023 05:33) (16 - 19)  SpO2: 99% (07 Jul 2023 05:33) (96% - 99%)    Parameters below as of 07 Jul 2023 05:33  Patient On (Oxygen Delivery Method): room air      I&O's Detail    06 Jul 2023 07:01  -  07 Jul 2023 07:00  --------------------------------------------------------  IN:  Total IN: 0 mL    OUT:    Drain (mL): 225 mL    Drain (mL): 3200 mL    Indwelling Catheter - Urethral (mL): 250 mL  Total OUT: 3675 mL    Total NET: -3675 mL          PHYSICAL EXAM    General: NAD, resting comfortably in bed  C/V: NSR  Pulm: Nonlabored breathing, no respiratory distress on room air  Abd: soft, ND/NT, no rebound tenderness, no guarding, no flank TTP  : portillo draining light pink urine, R PCN clear yellow, L PCN serosanguineous  Extrem: WWP, no edema, SCDs in place        LABS:                        9.5    16.98 )-----------( 321      ( 06 Jul 2023 16:53 )             31.2     07-06    135  |  104  |  74<H>  ----------------------------<  155<H>  4.9   |  18<L>  |  5.07<H>    Ca    9.4      06 Jul 2023 16:53  Phos  4.9     07-06  Mg     1.8     07-06    TPro  5.9<L>  /  Alb  2.5<L>  /  TBili  0.2  /  DBili  x   /  AST  11  /  ALT  8<L>  /  AlkPhos  62  07-06    PT/INR - ( 06 Jul 2023 07:31 )   PT: 15.3 sec;   INR: 1.28          PTT - ( 06 Jul 2023 07:31 )  PTT:31.1 sec  Urinalysis Basic - ( 06 Jul 2023 16:53 )    Color: x / Appearance: x / SG: x / pH: x  Gluc: 155 mg/dL / Ketone: x  / Bili: x / Urobili: x   Blood: x / Protein: x / Nitrite: x   Leuk Esterase: x / RBC: x / WBC x   Sq Epi: x / Non Sq Epi: x / Bacteria: x        CULTURES:      Culture - Blood (collected 07-04-23 @ 03:10)  Source: .Blood Blood-Peripheral  Preliminary Report (07-07-23 @ 04:00):    No growth at 3 days.    Culture - Blood (collected 07-04-23 @ 03:10)  Source: .Blood Blood-Peripheral  Preliminary Report (07-07-23 @ 04:00):    No growth at 3 days.        Culture - Urine (collected 07-05-23 @ 10:11)  Source: Catheterized None  Preliminary Report (07-06-23 @ 08:49):    Culture in progress    Culture - Urine (collected 07-04-23 @ 00:00)  Source: Clean Catch Clean Catch (Midstream)  Final Report (07-05-23 @ 09:17):    Specimen appears CONTAMINATED. Lab suggests repeat clean catch specimen.        RADIOLOGY & ADDITIONAL STUDIES:  
INTERVAL HPI/OVERNIGHT EVENTS: [place] o/n    SUBJECTIVE: Patient seen and examined at bedside.   Pt reports no complaints. Eating and drinking wo issues.   There is pink colored urine in nephrostomy output. No pain, fever, chest pain, dyspnea, N/V/Abd pain    OBJECTIVE:    VITAL SIGNS:  ICU Vital Signs Last 24 Hrs  T(C): 36.8 (09 Jul 2023 05:04), Max: 36.8 (08 Jul 2023 21:03)  T(F): 98.3 (09 Jul 2023 05:04), Max: 98.3 (09 Jul 2023 05:04)  HR: 95 (09 Jul 2023 06:32) (95 - 99)  BP: 128/83 (09 Jul 2023 06:32) (128/83 - 134/83)  BP(mean): --  ABP: --  ABP(mean): --  RR: 16 (09 Jul 2023 05:04) (16 - 18)  SpO2: 97% (09 Jul 2023 05:04) (97% - 97%)    O2 Parameters below as of 08 Jul 2023 21:03  Patient On (Oxygen Delivery Method): room air              07-08 @ 07:01  -  07-09 @ 07:00  --------------------------------------------------------  IN: 630 mL / OUT: 7450 mL / NET: -6820 mL    07-09 @ 07:01  -  07-09 @ 15:11  --------------------------------------------------------  IN: 0 mL / OUT: 1025 mL / NET: -1025 mL      CAPILLARY BLOOD GLUCOSE      POCT Blood Glucose.: 98 mg/dL (09 Jul 2023 13:47)      PHYSICAL EXAM:  GEN: Male in NAD on RA  HEENT: NC/AT, MMM  CV: RRR, nml S1S2, no murmurs  PULM: nml effort, CTAB  ABD: Soft, non-distended, NABS, non-tender  NEURO  A/O x3, moving all extremities, Sensation intact  : b/l nephrostomy tubes in place   PSYCH: Appropriate      MEDICATIONS:  MEDICATIONS  (STANDING):  apixaban 5 milliGRAM(s) Oral every 12 hours  diltiazem    milliGRAM(s) Oral every 24 hours  finasteride 5 milliGRAM(s) Oral daily  latanoprost 0.005% Ophthalmic Solution 1 Drop(s) Both EYES at bedtime  linezolid    Tablet 600 milliGRAM(s) Oral every 12 hours  metoprolol tartrate 25 milliGRAM(s) Oral every 12 hours  pantoprazole    Tablet 40 milliGRAM(s) Oral before breakfast  sodium chloride 0.45%. 1000 milliLiter(s) (80 mL/Hr) IV Continuous <Continuous>  tamsulosin 0.4 milliGRAM(s) Oral at bedtime    MEDICATIONS  (PRN):  acetaminophen     Tablet .. 650 milliGRAM(s) Oral every 6 hours PRN Temp greater or equal to 38C (100.4F), Mild Pain (1 - 3)  melatonin 3 milliGRAM(s) Oral at bedtime PRN Insomnia  oxybutynin 5 milliGRAM(s) Oral every 8 hours PRN Bladder spasms      ALLERGIES:  Allergies    No Known Allergies    Intolerances        LABS:                        8.9    10.27 )-----------( 316      ( 09 Jul 2023 08:05 )             28.9     07-09    145  |  113<H>  |  40<H>  ----------------------------<  91  3.9   |  19<L>  |  2.56<H>    Ca    9.8      09 Jul 2023 08:05  Phos  2.9     07-09  Mg     1.7     07-09    TPro  6.4  /  Alb  2.8<L>  /  TBili  0.2  /  DBili  x   /  AST  18  /  ALT  14  /  AlkPhos  62  07-09      Urinalysis Basic - ( 09 Jul 2023 08:05 )    Color: x / Appearance: x / SG: x / pH: x  Gluc: 91 mg/dL / Ketone: x  / Bili: x / Urobili: x   Blood: x / Protein: x / Nitrite: x   Leuk Esterase: x / RBC: x / WBC x   Sq Epi: x / Non Sq Epi: x / Bacteria: x        RADIOLOGY & ADDITIONAL TESTS: Reviewed.
INTERVAL HPI/OVERNIGHT EVENTS: [place] o/n    SUBJECTIVE: Patient seen and examined at bedside.   Pt reports some mild R shoulder/deltoid discomfort. Denies any numbness, fever, chest pain, dyspnea. Eating wo N/V/Abd pain.     UOP in 24h from 5.5L    INTAKE is NOT being tracked/recorded*****   60cc/hr 1/2 NS since 1200h 7/7. = 1100cc. Assume additional 1-2L intake pt still net negative 2L    OBJECTIVE:    VITAL SIGNS:  ICU Vital Signs Last 24 Hrs  T(C): 36.4 (08 Jul 2023 14:40), Max: 36.8 (07 Jul 2023 21:05)  T(F): 97.5 (08 Jul 2023 14:40), Max: 98.3 (07 Jul 2023 21:05)  HR: 100 (08 Jul 2023 14:40) (99 - 113)  BP: 131/93 (08 Jul 2023 14:40) (123/81 - 131/93)  BP(mean): --  ABP: --  ABP(mean): --  RR: 17 (08 Jul 2023 14:40) (17 - 18)  SpO2: 98% (08 Jul 2023 14:40) (95% - 98%)    O2 Parameters below as of 08 Jul 2023 14:40  Patient On (Oxygen Delivery Method): room air              07-07 @ 07:01 - 07-08 @ 07:00  --------------------------------------------------------  IN: 0 mL / OUT: 5550 mL / NET: -5550 mL    07-08 @ 07:01  -  07-08 @ 17:57  --------------------------------------------------------  IN: 0 mL / OUT: 2772 mL / NET: -2772 mL      CAPILLARY BLOOD GLUCOSE      POCT Blood Glucose.: 148 mg/dL (08 Jul 2023 17:41)      PHYSICAL EXAM:  GEN: Male in NAD on RA  HEENT: NC/AT, MMM  CV: RRR, nml S1S2, no murmurs  PULM: nml effort, CTAB  ABD: Soft, non-distended, NABS, non-tender  NEURO  A/O x3, moving all extremities, Sensation intact  : b/l nephrostomy tubes in place   PSYCH: Appropriate      MEDICATIONS:  MEDICATIONS  (STANDING):  apixaban 5 milliGRAM(s) Oral every 12 hours  diltiazem    milliGRAM(s) Oral every 24 hours  finasteride 5 milliGRAM(s) Oral daily  latanoprost 0.005% Ophthalmic Solution 1 Drop(s) Both EYES at bedtime  linezolid    Tablet 600 milliGRAM(s) Oral every 12 hours  metoprolol tartrate 25 milliGRAM(s) Oral every 12 hours  pantoprazole    Tablet 40 milliGRAM(s) Oral before breakfast  polyethylene glycol 3350 17 Gram(s) Oral daily  senna 2 Tablet(s) Oral at bedtime  sodium chloride 0.45%. 1000 milliLiter(s) (80 mL/Hr) IV Continuous <Continuous>  tamsulosin 0.4 milliGRAM(s) Oral at bedtime    MEDICATIONS  (PRN):  acetaminophen     Tablet .. 650 milliGRAM(s) Oral every 6 hours PRN Temp greater or equal to 38C (100.4F), Mild Pain (1 - 3)  melatonin 3 milliGRAM(s) Oral at bedtime PRN Insomnia  oxybutynin 5 milliGRAM(s) Oral every 8 hours PRN Bladder spasms      ALLERGIES:  Allergies    No Known Allergies    Intolerances        LABS:                        9.1    11.60 )-----------( 321      ( 08 Jul 2023 05:30 )             29.1     07-08    142  |  110<H>  |  57<H>  ----------------------------<  90  4.4   |  21<L>  |  3.83<H>    Ca    9.9      08 Jul 2023 05:30  Phos  3.7     07-08  Mg     1.6     07-08    TPro  6.4  /  Alb  3.0<L>  /  TBili  0.2  /  DBili  x   /  AST  14  /  ALT  12  /  AlkPhos  59  07-08      Urinalysis Basic - ( 08 Jul 2023 05:30 )    Color: x / Appearance: x / SG: x / pH: x  Gluc: 90 mg/dL / Ketone: x  / Bili: x / Urobili: x   Blood: x / Protein: x / Nitrite: x   Leuk Esterase: x / RBC: x / WBC x   Sq Epi: x / Non Sq Epi: x / Bacteria: x        RADIOLOGY & ADDITIONAL TESTS: Reviewed.
Patient is a 73y Male seen and evaluated at bedside.       Meds:    acetaminophen     Tablet .. 650 every 6 hours PRN  apixaban 5 every 12 hours  diltiazem    every 24 hours  finasteride 5 daily  latanoprost 0.005% Ophthalmic Solution 1 at bedtime  linezolid    Tablet 600 every 12 hours  magnesium sulfate  IVPB 2 once  melatonin 3 at bedtime PRN  metoprolol tartrate 25 every 12 hours  oxybutynin 5 every 8 hours PRN  pantoprazole    Tablet 40 before breakfast  polyethylene glycol 3350 17 daily  senna 2 at bedtime  sodium chloride 0.45%. 1000 <Continuous>  tamsulosin 0.4 at bedtime      T(C): , Max: 36.8 (07-07-23 @ 21:05)  T(F): , Max: 98.3 (07-07-23 @ 21:05)  HR: 113 (07-08-23 @ 10:44)  BP: 125/71 (07-08-23 @ 10:44)  BP(mean): --  RR: 18 (07-08-23 @ 10:44)  SpO2: 97% (07-08-23 @ 10:44)  Wt(kg): --    07-07 @ 07:01  -  07-08 @ 07:00  --------------------------------------------------------  IN: 0 mL / OUT: 5550 mL / NET: -5550 mL    07-08 @ 07:01  -  07-08 @ 13:14  --------------------------------------------------------  IN: 0 mL / OUT: 2497 mL / NET: -2497 mL          Review of Systems:  ROS negative except as per HPI      PHYSICAL EXAM:  GENERAL: Alert, awake, NAD  CHEST/LUNG: Bilateral clear breath sounds  HEART: Regular rate and rhythm, no murmur  ABDOMEN: Soft, nontender, non distended  : Monterroso in place, draining urine. B/L nephrostomies  EXTREMITIES: no edema  Neurology: AAOx3, no focal neurological deficit      LABS:                        9.1    11.60 )-----------( 321      ( 08 Jul 2023 05:30 )             29.1     07-08    142  |  110<H>  |  57<H>  ----------------------------<  90  4.4   |  21<L>  |  3.83<H>    Ca    9.9      08 Jul 2023 05:30  Phos  3.7     07-08  Mg     1.6     07-08    TPro  6.4  /  Alb  3.0<L>  /  TBili  0.2  /  DBili  x   /  AST  14  /  ALT  12  /  AlkPhos  59  07-08        Urinalysis Basic - ( 08 Jul 2023 05:30 )    Color: x / Appearance: x / SG: x / pH: x  Gluc: 90 mg/dL / Ketone: x  / Bili: x / Urobili: x   Blood: x / Protein: x / Nitrite: x   Leuk Esterase: x / RBC: x / WBC x   Sq Epi: x / Non Sq Epi: x / Bacteria: x            RADIOLOGY & ADDITIONAL STUDIES:          
Patient is a 73y Male seen and evaluated at bedside.       Meds:    acetaminophen     Tablet .. 650 every 6 hours PRN  apixaban 5 every 12 hours  diltiazem    every 24 hours  finasteride 5 daily  latanoprost 0.005% Ophthalmic Solution 1 at bedtime  linezolid    Tablet 600 every 12 hours  melatonin 3 at bedtime PRN  metoprolol tartrate 25 every 12 hours  oxybutynin 5 every 8 hours PRN  pantoprazole    Tablet 40 before breakfast  sodium chloride 0.45%. 1000 <Continuous>  tamsulosin 0.4 at bedtime      T(C): , Max: 36.7 (07-09-23 @ 16:22)  T(F): , Max: 98 (07-09-23 @ 16:22)  HR: 108 (07-10-23 @ 05:37)  BP: 133/85 (07-10-23 @ 05:37)  BP(mean): --  RR: 17 (07-10-23 @ 05:37)  SpO2: 99% (07-10-23 @ 05:37)  Wt(kg): --    07-09 @ 07:01  -  07-10 @ 07:00  --------------------------------------------------------  IN: 0 mL / OUT: 3475 mL / NET: -3475 mL    07-10 @ 07:01  -  07-10 @ 12:33  --------------------------------------------------------  IN: 275 mL / OUT: 800 mL / NET: -525 mL        Weight (kg): 92.4 (07-10 @ 11:22)    Review of Systems:  ROS negative except as per HPI      PHYSICAL EXAM:  GENERAL: Alert, awake, NAD  CHEST/LUNG: Bilateral clear breath sounds  HEART: Regular rate and rhythm, no murmur  ABDOMEN: Soft, nontender, non distended  : Monterroso in place, draining urine. B/L nephrostomies  EXTREMITIES: no edema  Neurology: AAOx3, no focal neurological deficit        LABS:                        9.5    10.85 )-----------( 318      ( 10 Jul 2023 11:39 )             30.5     07-10    144  |  112<H>  |  30<H>  ----------------------------<  96  3.6   |  22  |  2.09<H>    Ca    9.7      10 Jul 2023 05:30  Phos  2.6     07-10  Mg     1.5     07-10    TPro  6.4  /  Alb  2.8<L>  /  TBili  0.2  /  DBili  x   /  AST  18  /  ALT  14  /  AlkPhos  62  07-09        Urinalysis Basic - ( 10 Jul 2023 05:30 )    Color: x / Appearance: x / SG: x / pH: x  Gluc: 96 mg/dL / Ketone: x  / Bili: x / Urobili: x   Blood: x / Protein: x / Nitrite: x   Leuk Esterase: x / RBC: x / WBC x   Sq Epi: x / Non Sq Epi: x / Bacteria: x            RADIOLOGY & ADDITIONAL STUDIES:          
Patient is a 73y Male seen and evaluated at bedside.       Meds:    acetaminophen     Tablet .. 650 every 6 hours PRN  apixaban 5 every 12 hours  diltiazem    every 24 hours  finasteride 5 daily  latanoprost 0.005% Ophthalmic Solution 1 at bedtime  linezolid    Tablet 600 every 12 hours  melatonin 3 at bedtime PRN  metoprolol tartrate 25 every 12 hours  oxybutynin 5 every 8 hours PRN  pantoprazole    Tablet 40 before breakfast  sodium chloride 0.45%. 1000 <Continuous>  tamsulosin 0.4 at bedtime      T(C): , Max: 36.8 (07-08-23 @ 21:03)  T(F): , Max: 98.3 (07-09-23 @ 05:04)  HR: 95 (07-09-23 @ 06:32)  BP: 128/83 (07-09-23 @ 06:32)  BP(mean): --  RR: 16 (07-09-23 @ 05:04)  SpO2: 97% (07-09-23 @ 05:04)  Wt(kg): --    07-08 @ 07:01  -  07-09 @ 07:00  --------------------------------------------------------  IN: 630 mL / OUT: 7450 mL / NET: -6820 mL    07-09 @ 07:01  -  07-09 @ 10:11  --------------------------------------------------------  IN: 0 mL / OUT: 450 mL / NET: -450 mL          Review of Systems:  ROS negative except as per HPI      PHYSICAL EXAM:  GENERAL: Alert, awake, NAD  CHEST/LUNG: Bilateral clear breath sounds  HEART: Regular rate and rhythm, no murmur  ABDOMEN: Soft, nontender, non distended  : Monterroso in place, draining urine. B/L nephrostomies  EXTREMITIES: no edema  Neurology: AAOx3, no focal neurological deficit        LABS:                        8.9    10.27 )-----------( 316      ( 09 Jul 2023 08:05 )             28.9     07-09    145  |  113<H>  |  40<H>  ----------------------------<  91  3.9   |  19<L>  |  2.56<H>    Ca    9.8      09 Jul 2023 08:05  Phos  2.9     07-09  Mg     1.7     07-09    TPro  6.4  /  Alb  2.8<L>  /  TBili  0.2  /  DBili  x   /  AST  18  /  ALT  14  /  AlkPhos  62  07-09        Urinalysis Basic - ( 09 Jul 2023 08:05 )    Color: x / Appearance: x / SG: x / pH: x  Gluc: 91 mg/dL / Ketone: x  / Bili: x / Urobili: x   Blood: x / Protein: x / Nitrite: x   Leuk Esterase: x / RBC: x / WBC x   Sq Epi: x / Non Sq Epi: x / Bacteria: x            RADIOLOGY & ADDITIONAL STUDIES:          
UROLOGY PROGRESS NOTE    SUBJECTIVE: Patient seen and examined bedside. Patient denies acute complaints, no fevers/chills, nausea/vomiting, or abd/flank pain.     diltiazem    milliGRAM(s) Oral every 24 hours  piperacillin/tazobactam IVPB.. 4.5 Gram(s) IV Intermittent every 12 hours      Vital Signs Last 24 Hrs  T(C): 36.4 (05 Jul 2023 05:48), Max: 36.9 (04 Jul 2023 21:13)  T(F): 97.6 (05 Jul 2023 05:48), Max: 98.4 (04 Jul 2023 21:13)  HR: 96 (05 Jul 2023 05:48) (69 - 98)  BP: 130/80 (05 Jul 2023 05:48) (114/69 - 130/80)  BP(mean): --  RR: 18 (05 Jul 2023 05:48) (18 - 18)  SpO2: 97% (05 Jul 2023 05:48) (95% - 98%)    Parameters below as of 05 Jul 2023 05:48  Patient On (Oxygen Delivery Method): room air      I&O's Detail    04 Jul 2023 07:01  -  05 Jul 2023 07:00  --------------------------------------------------------  IN:  Total IN: 0 mL    OUT:    Indwelling Catheter - Urethral (mL): 2550 mL  Total OUT: 2550 mL    Total NET: -2550 mL          PHYSICAL EXAM    General: NAD, resting comfortably in bed  C/V: NSR  Pulm: Nonlabored breathing, no respiratory distress on room air  Abd: soft, ND/NT, no rebound tenderness, no guarding, no flank TTP  : portillo draining clear yellow urine.  Extrem: WWP, no edema, SCDs in place        LABS:                        8.1    8.18  )-----------( 276      ( 05 Jul 2023 06:07 )             25.9     07-05    134<L>  |  104  |  71<H>  ----------------------------<  98  4.6   |  18<L>  |  4.68<H>    Ca    9.5      05 Jul 2023 06:07  Phos  3.8     07-05  Mg     1.8     07-05    TPro  6.1  /  Alb  2.8<L>  /  TBili  0.3  /  DBili  x   /  AST  13  /  ALT  10  /  AlkPhos  72  07-05    PT/INR - ( 04 Jul 2023 00:00 )   PT: 16.3 sec;   INR: 1.37          PTT - ( 04 Jul 2023 00:00 )  PTT:28.6 sec  Urinalysis Basic - ( 05 Jul 2023 06:07 )    Color: x / Appearance: x / SG: x / pH: x  Gluc: 98 mg/dL / Ketone: x  / Bili: x / Urobili: x   Blood: x / Protein: x / Nitrite: x   Leuk Esterase: x / RBC: x / WBC x   Sq Epi: x / Non Sq Epi: x / Bacteria: x        CULTURES:      Culture - Blood (collected 07-04-23 @ 03:10)  Source: .Blood Blood-Peripheral  Preliminary Report (07-05-23 @ 04:01):    No growth at 1 day.    Culture - Blood (collected 07-04-23 @ 03:10)  Source: .Blood Blood-Peripheral  Preliminary Report (07-05-23 @ 04:01):    No growth at 1 day.        Culture - Urine (collected 07-04-23 @ 00:00)  Source: Clean Catch Clean Catch (Midstream)  Preliminary Report (07-04-23 @ 08:33):    Culture in progress        RADIOLOGY & ADDITIONAL STUDIES:  
UROLOGY PROGRESS NOTE    SUBJECTIVE: Patient seen and examined bedside. Patient denies acute complaints, no fevers/chills, nausea/vomiting, or abd/flank pain.     diltiazem    milliGRAM(s) Oral every 24 hours  piperacillin/tazobactam IVPB.. 4.5 Gram(s) IV Intermittent every 8 hours      Vital Signs Last 24 Hrs  T(C): 36.7 (06 Jul 2023 08:41), Max: 36.7 (06 Jul 2023 08:41)  T(F): 98.1 (06 Jul 2023 08:41), Max: 98.1 (06 Jul 2023 08:41)  HR: 83 (06 Jul 2023 08:41) (83 - 110)  BP: 122/75 (06 Jul 2023 08:41) (106/58 - 141/81)  BP(mean): 94 (05 Jul 2023 20:40) (76 - 107)  RR: 16 (06 Jul 2023 08:41) (16 - 31)  SpO2: 97% (06 Jul 2023 08:41) (94% - 100%)    Parameters below as of 06 Jul 2023 08:41  Patient On (Oxygen Delivery Method): room air      I&O's Detail    05 Jul 2023 07:01  -  06 Jul 2023 07:00  --------------------------------------------------------  IN:  Total IN: 0 mL    OUT:    Indwelling Catheter - Urethral (mL): 350 mL  Total OUT: 350 mL    Total NET: -350 mL          PHYSICAL EXAM    General: NAD, resting comfortably in bed  C/V: NSR  Pulm: Nonlabored breathing, no respiratory distress on room air  Abd: soft, ND/NT, no rebound tenderness, no guarding, no flank TTP  : portillo draining light pink, off CBI.  Extrem: WWP, no edema, SCDs in place        LABS:                        9.3    10.77 )-----------( 299      ( 06 Jul 2023 08:32 )             30.1     07-06    132<L>  |  102  |  76<H>  ----------------------------<  264<H>  5.0   |  17<L>  |  5.33<H>    Ca    8.9      06 Jul 2023 05:30  Phos  5.2     07-06  Mg     1.8     07-06    TPro  5.8<L>  /  Alb  2.5<L>  /  TBili  0.2  /  DBili  x   /  AST  11  /  ALT  8<L>  /  AlkPhos  67  07-06    PT/INR - ( 06 Jul 2023 07:31 )   PT: 15.3 sec;   INR: 1.28          PTT - ( 06 Jul 2023 07:31 )  PTT:31.1 sec  Urinalysis Basic - ( 06 Jul 2023 05:30 )    Color: x / Appearance: x / SG: x / pH: x  Gluc: 264 mg/dL / Ketone: x  / Bili: x / Urobili: x   Blood: x / Protein: x / Nitrite: x   Leuk Esterase: x / RBC: x / WBC x   Sq Epi: x / Non Sq Epi: x / Bacteria: x        CULTURES:      Culture - Blood (collected 07-04-23 @ 03:10)  Source: .Blood Blood-Peripheral  Preliminary Report (07-06-23 @ 04:00):    No growth at 2 days.    Culture - Blood (collected 07-04-23 @ 03:10)  Source: .Blood Blood-Peripheral  Preliminary Report (07-06-23 @ 04:00):    No growth at 2 days.        Culture - Urine (collected 07-05-23 @ 10:11)  Source: Catheterized None  Preliminary Report (07-06-23 @ 08:49):    Culture in progress    Culture - Urine (collected 07-04-23 @ 00:00)  Source: Clean Catch Clean Catch (Midstream)  Final Report (07-05-23 @ 09:17):    Specimen appears CONTAMINATED. Lab suggests repeat clean catch specimen.        RADIOLOGY & ADDITIONAL STUDIES:  
UROLOGY PROGRESS NOTE    SUBJECTIVE: Patient seen and examined bedside. Patient denies acute complaints. No fevers/chills or ab/flank pain.    apixaban 5 milliGRAM(s) Oral every 12 hours  diltiazem    milliGRAM(s) Oral every 24 hours  linezolid    Tablet 600 milliGRAM(s) Oral every 12 hours  metoprolol tartrate 25 milliGRAM(s) Oral every 12 hours      Vital Signs Last 24 Hrs  T(C): 36.7 (10 Jul 2023 05:37), Max: 36.7 (09 Jul 2023 16:22)  T(F): 98 (10 Jul 2023 05:37), Max: 98 (09 Jul 2023 16:22)  HR: 108 (10 Jul 2023 05:37) (86 - 108)  BP: 133/85 (10 Jul 2023 05:37) (126/81 - 136/82)  BP(mean): --  RR: 17 (10 Jul 2023 05:37) (17 - 18)  SpO2: 99% (10 Jul 2023 05:37) (96% - 99%)    Parameters below as of 10 Jul 2023 05:37  Patient On (Oxygen Delivery Method): room air      I&O's Detail    09 Jul 2023 07:01  -  10 Jul 2023 07:00  --------------------------------------------------------  IN:  Total IN: 0 mL    OUT:    Drain (mL): 3250 mL    Drain (mL): 225 mL  Total OUT: 3475 mL    Total NET: -3475 mL          PHYSICAL EXAM    General: NAD, resting comfortably in bed  C/V: NSR  Pulm: Nonlabored breathing, no respiratory distress on room air  Abd: soft, ND/NT, no rebound tenderness, no guarding, no flank TTP  : portillo draining light fruit punch. L PCN serosanguineous, R PCN serous.  Extrem: WWP, no edema, SCDs in place        LABS:                        9.0    10.48 )-----------( 297      ( 10 Jul 2023 05:30 )             29.2     07-10    144  |  112<H>  |  30<H>  ----------------------------<  96  3.6   |  22  |  2.09<H>    Ca    9.7      10 Jul 2023 05:30  Phos  2.6     07-10  Mg     1.5     07-10    TPro  6.4  /  Alb  2.8<L>  /  TBili  0.2  /  DBili  x   /  AST  18  /  ALT  14  /  AlkPhos  62  07-09      Urinalysis Basic - ( 10 Jul 2023 05:30 )    Color: x / Appearance: x / SG: x / pH: x  Gluc: 96 mg/dL / Ketone: x  / Bili: x / Urobili: x   Blood: x / Protein: x / Nitrite: x   Leuk Esterase: x / RBC: x / WBC x   Sq Epi: x / Non Sq Epi: x / Bacteria: x        CULTURES:      Culture - Blood (collected 07-07-23 @ 17:40)  Source: .Blood Blood  Preliminary Report (07-09-23 @ 18:00):    No growth at 2 days.    Culture - Blood (collected 07-07-23 @ 17:40)  Source: .Blood Blood  Preliminary Report (07-09-23 @ 18:00):    No growth at 2 days.    Culture - Blood (collected 07-04-23 @ 03:10)  Source: .Blood Blood-Peripheral  Final Report (07-09-23 @ 04:01):    No growth at 5 days.    Culture - Blood (collected 07-04-23 @ 03:10)  Source: .Blood Blood-Peripheral  Final Report (07-09-23 @ 04:01):    No growth at 5 days.        Culture - Urine (collected 07-06-23 @ 10:25)  Source: .Urine Right nephrostomy  Final Report (07-09-23 @ 12:00):    >100,000 CFU/ml Staphylococcus aureus    >100,000 CFU/ml Methicillin Resistant Staphylococcus aureus    Result called to and read back by_ Ms. ENMA Ken RN  07/07/2023 08:50:47  Organism: Staphylococcus aureus  Staphylococcus aureus  Methicillin resistant Staphylococcus aureus  Methicillin resistant Staphylococcus aureus (07-09-23 @ 12:00)  Organism: Methicillin resistant Staphylococcus aureus (07-09-23 @ 12:00)      Method Type: ETEST      -  Vancomycin: S 1.5  Organism: Methicillin resistant Staphylococcus aureus (07-09-23 @ 12:00)      Method Type: DUARTE      -  Daptomycin: S 0.5      -  Linezolid: S 2      -  Oxacillin: R >2      -  Rifampin: S <=1 Should not be used as monotherapy      -  Tetracycline: S <=1      -  Trimethoprim/Sulfamethoxazole: S <=0.5/9.5      -  Vancomycin: S 1  Organism: Staphylococcus aureus (07-09-23 @ 12:00)      Method Type: DUARTE      -  Linezolid: S 2      -  Nitrofurantoin: S <=32      -  Oxacillin: S 1 Oxacillin predicts susceptibility for dicloxacillin, methicillin, and nafcillin      -  Rifampin: S <=1 Should not be used as monotherapy      -  Trimethoprim/Sulfamethoxazole: S <=0.5/9.5      -  Ceftriaxone: S <=4      -  Ciprofloxacin: R >2      -  Vancomycin: S 2  Organism: Staphylococcus aureus (07-09-23 @ 12:00)      Method Type: ETEST      -  Vancomycin: S 2    Culture - Urine (collected 07-06-23 @ 10:20)  Source: .Urine Left nephrostomy  Final Report (07-09-23 @ 11:57):    No growth    Culture - Urine (collected 07-05-23 @ 10:11)  Source: Catheterized None  Final Report (07-08-23 @ 12:55):    10,000 CFU/ml Methicillin Resistant Staphylococcus aureus    Floor previously notified.  Organism: Methicillin resistant Staphylococcus aureus (07-08-23 @ 12:55)  Organism: Methicillin resistant Staphylococcus aureus (07-08-23 @ 12:55)      Method Type: DUARTE      -  Daptomycin: S 1      -  Linezolid: S 4      -  Oxacillin: R >2      -  Rifampin: S <=1 Should not be used as monotherapy      -  Tetracycline: S <=1      -  Trimethoprim/Sulfamethoxazole: S <=0.5/9.5      -  Vancomycin: S 2    Culture - Urine (collected 07-04-23 @ 00:00)  Source: Clean Catch Clean Catch (Midstream)  Final Report (07-05-23 @ 09:17):    Specimen appears CONTAMINATED. Lab suggests repeat clean catch specimen.        RADIOLOGY & ADDITIONAL STUDIES:  
OVERNIGHT EVENTS: SUNDAR as per night team.     SUBJECTIVE:  Patient seen and examined at bedside, pt states that he is feeling better, however he states that he does have mild discomfort around the area of the nephrostomy site.     Vital Signs Last 12 Hrs  T(F): 97 (07-07-23 @ 15:43), Max: 97.8 (07-07-23 @ 05:33)  HR: 99 (07-07-23 @ 15:43) (95 - 99)  BP: 119/76 (07-07-23 @ 15:43) (110/74 - 120/80)  BP(mean): --  RR: 18 (07-07-23 @ 15:43) (18 - 18)  SpO2: 99% (07-07-23 @ 15:43) (97% - 99%)  I&O's Summary    06 Jul 2023 07:01  -  07 Jul 2023 07:00  --------------------------------------------------------  IN: 0 mL / OUT: 3675 mL / NET: -3675 mL    07 Jul 2023 07:01  -  07 Jul 2023 16:06  --------------------------------------------------------  IN: 0 mL / OUT: 775 mL / NET: -775 mL        PHYSICAL EXAM:  Constitutional: NAD, mild discomfort in bed.  HEENT: NC/AT  Neck: Supple  Respiratory: CTA B/L. No w/r/r.   Cardiovascular: RRR, normal S1 and S2, no m/r/g.   Gastrointestinal: +BS, soft NT, no guarding or rebound tenderness, no palpable masses   Extremities: wwp  Vascular: Pulses equal and strong throughout.   Neurological: AAOx3  Skin: No gross skin abnormalities or rashes        LABS:                        9.4    16.38 )-----------( 311      ( 07 Jul 2023 05:30 )             30.2     07-07    138  |  110<H>  |  69<H>  ----------------------------<  135<H>  5.2   |  20<L>  |  4.66<H>    Ca    9.9      07 Jul 2023 05:30  Phos  3.8     07-07  Mg     1.8     07-07    TPro  6.5  /  Alb  2.9<L>  /  TBili  0.2  /  DBili  x   /  AST  13  /  ALT  10  /  AlkPhos  71  07-07    PT/INR - ( 06 Jul 2023 07:31 )   PT: 15.3 sec;   INR: 1.28          PTT - ( 06 Jul 2023 07:31 )  PTT:31.1 sec  Urinalysis Basic - ( 07 Jul 2023 05:30 )    Color: x / Appearance: x / SG: x / pH: x  Gluc: 135 mg/dL / Ketone: x  / Bili: x / Urobili: x   Blood: x / Protein: x / Nitrite: x   Leuk Esterase: x / RBC: x / WBC x   Sq Epi: x / Non Sq Epi: x / Bacteria: x          RADIOLOGY & ADDITIONAL TESTS:    MEDICATIONS  (STANDING):  diltiazem    milliGRAM(s) Oral every 24 hours  finasteride 5 milliGRAM(s) Oral daily  latanoprost 0.005% Ophthalmic Solution 1 Drop(s) Both EYES at bedtime  pantoprazole    Tablet 40 milliGRAM(s) Oral before breakfast  piperacillin/tazobactam IVPB.. 4.5 Gram(s) IV Intermittent every 8 hours  polyethylene glycol 3350 17 Gram(s) Oral daily  senna 2 Tablet(s) Oral at bedtime  sodium chloride 0.45%. 1000 milliLiter(s) (60 mL/Hr) IV Continuous <Continuous>  tamsulosin 0.4 milliGRAM(s) Oral at bedtime    MEDICATIONS  (PRN):  acetaminophen     Tablet .. 650 milliGRAM(s) Oral every 6 hours PRN Temp greater or equal to 38C (100.4F), Mild Pain (1 - 3)  melatonin 3 milliGRAM(s) Oral at bedtime PRN Insomnia  oxybutynin 5 milliGRAM(s) Oral every 8 hours PRN Bladder spasms  
    SUBJECTIVE:  Patient seen and examined at bedside and states that his doctor told him to go to the hospital after he had a fever of 101 and got labs results back that indicated he may have a UTI.     Vital Signs Last 12 Hrs  T(F): 98.4 (07-04-23 @ 21:13), Max: 98.4 (07-04-23 @ 21:13)  HR: 98 (07-04-23 @ 21:13) (84 - 98)  BP: 126/73 (07-04-23 @ 21:13) (125/76 - 126/73)  BP(mean): --  RR: 18 (07-04-23 @ 21:13) (18 - 18)  SpO2: 98% (07-04-23 @ 21:13) (96% - 98%)  I&O's Summary    04 Jul 2023 07:01  -  04 Jul 2023 21:31  --------------------------------------------------------  IN: 0 mL / OUT: 1300 mL / NET: -1300 mL        PHYSICAL EXAM:  Constitutional: NAD, comfortable in bed.  HEENT: NC/AT,   Neck: Supple,   Respiratory: CTA B/L. No w/r/r.   Cardiovascular: RRR, normal S1 and S2, no m/r/g.   Gastrointestinal: +BS, soft NTND, no guarding or rebound tenderness, no palpable masses   Extremities: wwp;  Vascular: Pulses equal and strong throughout.   Neurological: AAOx3,  Skin: No gross skin abnormalities chronic venous changes         LABS:                        7.7    7.29  )-----------( 242      ( 04 Jul 2023 05:30 )             24.5     07-04    136  |  107  |  72<H>  ----------------------------<  97  4.4   |  17<L>  |  4.30<H>    Ca    8.8      04 Jul 2023 05:30  Phos  4.1     07-04  Mg     1.6     07-04    TPro  5.8<L>  /  Alb  2.7<L>  /  TBili  0.2  /  DBili  x   /  AST  14  /  ALT  10  /  AlkPhos  73  07-04    PT/INR - ( 04 Jul 2023 00:00 )   PT: 16.3 sec;   INR: 1.37          PTT - ( 04 Jul 2023 00:00 )  PTT:28.6 sec  Urinalysis Basic - ( 04 Jul 2023 05:30 )    Color: x / Appearance: x / SG: x / pH: x  Gluc: 97 mg/dL / Ketone: x  / Bili: x / Urobili: x   Blood: x / Protein: x / Nitrite: x   Leuk Esterase: x / RBC: x / WBC x   Sq Epi: x / Non Sq Epi: x / Bacteria: x          RADIOLOGY & ADDITIONAL TESTS:    MEDICATIONS  (STANDING):  diltiazem    milliGRAM(s) Oral every 24 hours  finasteride 5 milliGRAM(s) Oral daily  latanoprost 0.005% Ophthalmic Solution 1 Drop(s) Both EYES at bedtime  pantoprazole    Tablet 40 milliGRAM(s) Oral before breakfast  polyethylene glycol 3350 17 Gram(s) Oral daily  senna 2 Tablet(s) Oral at bedtime  sodium bicarbonate 650 milliGRAM(s) Oral three times a day  tamsulosin 0.4 milliGRAM(s) Oral at bedtime    MEDICATIONS  (PRN):  acetaminophen     Tablet .. 650 milliGRAM(s) Oral every 6 hours PRN Temp greater or equal to 38C (100.4F), Mild Pain (1 - 3)  melatonin 3 milliGRAM(s) Oral at bedtime PRN Insomnia  
SUBJECTIVE:   As per night team, no overnight events. Patient seen and examined at bedside, he has no complains, and states that he is not thirsty and dose not have an appetite     Vital Signs Last 12 Hrs  T(F): 97.8 (23 @ 15:25), Max: 97.8 (23 @ 10:08)  HR: 93 (23 @ 15:25) (93 - 96)  BP: 116/73 (23 @ 15:25) (116/73 - 130/80)  BP(mean): --  RR: 17 (23 @ 15:25) (17 - 18)  SpO2: 98% (23 @ 15:25) (97% - 98%)  I&O's Summary    2023 07:01  -  2023 07:00  --------------------------------------------------------  IN: 0 mL / OUT: 2550 mL / NET: -2550 mL    2023 07:01  -  2023 17:41  --------------------------------------------------------  IN: 0 mL / OUT: 350 mL / NET: -350 mL        PHYSICAL EXAM:  Constitutional: NAD  HEENT: NC/AT  Neck: Supple  Respiratory: CTA B/L. No w/r/r.   Cardiovascular: RRR, normal S1 and S2, no m/r/g.   Gastrointestinal: +BS, soft NTND  Extremities: wwp  Vascular: Pulses equal and strong throughout.   Neurological: AAOx3, sensation intact throughout.   Skin: chronic venous changes in b/l LE         LABS:                        8.1    8.18  )-----------( 276      ( 2023 06:07 )             25.9     07-05    134<L>  |  104  |  71<H>  ----------------------------<  98  4.6   |  18<L>  |  4.68<H>    Ca    9.5      2023 06:07  Phos  3.8     07-05  Mg     1.8     07-05    TPro  6.1  /  Alb  2.8<L>  /  TBili  0.3  /  DBili  x   /  AST  13  /  ALT  10  /  AlkPhos  72  07-05    PT/INR - ( 2023 00:00 )   PT: 16.3 sec;   INR: 1.37          PTT - ( 2023 00:00 )  PTT:28.6 sec  Urinalysis Basic - ( 2023 10:11 )    Color: Yellow / Appearance: Clear / S.015 / pH: x  Gluc: x / Ketone: NEGATIVE  / Bili: Negative / Urobili: 0.2 E.U./dL   Blood: x / Protein: 30 mg/dL / Nitrite: NEGATIVE   Leuk Esterase: Large / RBC: Many /HPF / WBC Many /HPF   Sq Epi: x / Non Sq Epi: x / Bacteria: Many /HPF          RADIOLOGY & ADDITIONAL TESTS:    MEDICATIONS  (STANDING):    MEDICATIONS  (PRN):  
    SUBJECTIVE: As per night team, no overnight events. Patient seen and examined at bedside he says he feels good. Otherwise, no complaints at this time. Patient denies, abdominal pain.     Vital Signs Last 12 Hrs  T(F): 97.6 (07-06-23 @ 16:51), Max: 98.1 (07-06-23 @ 08:41)  HR: 89 (07-06-23 @ 16:51) (83 - 89)  BP: 103/70 (07-06-23 @ 16:51) (103/70 - 122/75)  BP(mean): 81 (07-06-23 @ 16:51) (81 - 81)  RR: 17 (07-06-23 @ 16:51) (16 - 17)  SpO2: 98% (07-06-23 @ 16:51) (97% - 98%)  I&O's Summary    05 Jul 2023 07:01  -  06 Jul 2023 07:00  --------------------------------------------------------  IN: 0 mL / OUT: 350 mL / NET: -350 mL    06 Jul 2023 07:01  -  06 Jul 2023 20:03  --------------------------------------------------------  IN: 0 mL / OUT: 2125 mL / NET: -2125 mL        PHYSICAL EXAM:  Constitutional: NAD, comfortable in bed.  HEENT: NC/AT  Neck: Supple  Respiratory: CTA B/L. No w/r/r.   Cardiovascular: RRR, normal S1 and S2, no m/r/g.   Gastrointestinal: +BS, soft NTND, no guarding or rebound tenderness, no palpable masses   Extremities: wwp, B/L LE edema.  Vascular: Pulses equal and strong throughout.   Neurological: AAOx3  Skin: Chronic LE venous changes        LABS:                        9.5    16.98 )-----------( 321      ( 06 Jul 2023 16:53 )             31.2     07-06    135  |  104  |  74<H>  ----------------------------<  155<H>  4.9   |  18<L>  |  5.07<H>    Ca    9.4      06 Jul 2023 16:53  Phos  4.9     07-06  Mg     1.8     07-06    TPro  5.9<L>  /  Alb  2.5<L>  /  TBili  0.2  /  DBili  x   /  AST  11  /  ALT  8<L>  /  AlkPhos  62  07-06    PT/INR - ( 06 Jul 2023 07:31 )   PT: 15.3 sec;   INR: 1.28          PTT - ( 06 Jul 2023 07:31 )  PTT:31.1 sec  Urinalysis Basic - ( 06 Jul 2023 16:53 )    Color: x / Appearance: x / SG: x / pH: x  Gluc: 155 mg/dL / Ketone: x  / Bili: x / Urobili: x   Blood: x / Protein: x / Nitrite: x   Leuk Esterase: x / RBC: x / WBC x   Sq Epi: x / Non Sq Epi: x / Bacteria: x          RADIOLOGY & ADDITIONAL TESTS:    MEDICATIONS  (STANDING):  diltiazem    milliGRAM(s) Oral every 24 hours  finasteride 5 milliGRAM(s) Oral daily  latanoprost 0.005% Ophthalmic Solution 1 Drop(s) Both EYES at bedtime  pantoprazole    Tablet 40 milliGRAM(s) Oral before breakfast  piperacillin/tazobactam IVPB.. 4.5 Gram(s) IV Intermittent every 8 hours  polyethylene glycol 3350 17 Gram(s) Oral daily  senna 2 Tablet(s) Oral at bedtime  tamsulosin 0.4 milliGRAM(s) Oral at bedtime    MEDICATIONS  (PRN):  acetaminophen     Tablet .. 650 milliGRAM(s) Oral every 6 hours PRN Temp greater or equal to 38C (100.4F), Mild Pain (1 - 3)  melatonin 3 milliGRAM(s) Oral at bedtime PRN Insomnia  oxybutynin 5 milliGRAM(s) Oral every 8 hours PRN Bladder spasms

## 2023-07-10 NOTE — PROGRESS NOTE ADULT - ATTENDING COMMENTS
72M somewhat poor historian PMH Afib (not on AC), HTN, , ?CKD, GERD, prostate cancer s/p brachytherapy, chronic indwelling portillo presents for fever found to have a UTI as well as ANIA with creatinine of 4.3 with unknown baseline.     #MRSA UTI   -Renal US:  Bilateral renal cysts and parenchymal disease.  Moderate bilateral hydronephrosis.  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  - s/p b/l PCN by IR 7/6  - urine cx from R nephro tb w MRSA  7/6 - ID consulted- recommended to dc zosyn and start – linezolid 600 q12 x6 thru 7/13    #hematuria   in portillo and B/l PCN   repeat hgb 9-9.5-- will cont to monitor cbc   - fu with urology and IR for eval   - will hold eliquis for now     #non oliguric ANIA on CKD due to post-renal obstruction  #low serum bicarb   #B/l moderate hydronephrosis  - baseline creatinine 0.92 from PCP Dr. Bush [phone]    - Urology took pt to OR for cystoscopy and bilateral ureteral stent placement 7/5-  unsuccessful and recommended IR for pcn->  s/p b/l PCN by IR 7/6  - cr 4.6-->   5.6 --4.6- -3.83 2.56 -> 2.09 today   - will dc fluids 7/10  - fu renal recs and fu renal fux and urine outpt     #Chronic indwelling portillo   #Prostate cancer s/p brachytherapy  - uro recs appreciated - will cw portillo for now w b/l PCN   - Continue tamsulosin and finasteride     #Afib   - will hold AC 7/10 -- jadiel blood in nephrostomy tube and portillo   - Continue diltiazem     #DISPO  - home w HPT when medically ready

## 2023-07-10 NOTE — PROGRESS NOTE ADULT - ASSESSMENT
72 yo M w/ no known renal disease, admitted for ANIA Cr 4.2 with workup revealing b/l mod hydro likely 2/2 post-radiation distal ureteral strictures., s/p unsuccessful attempt to place bilateral ureteral stents 7/5, then s/p b/l PCN placement by IR 7/6. Cr downtrending since then, at 2.09, today after peak of 5.3 7/6. Pt also w/ post-obstructive diuresis, as high as UO ~8L 7/8-7/9,  requiring IV fluids since 7/7    #Non-oliguric ANIA due to post-renal obstruction  BCr not known, but reportedly normal  UA w/ hematuria, proteinuria (UPCR 1.7). Hematuria due to portillo likely. Will need to follow the proteinuria, but at this time acute GN process unlikely  Liza 47  US w/ bilateral mod hydro, Rt 14.3cm, Lt 14.1cm, b/l increased echogenicity, decompressed bladder    Recommend:  UO now slowing; 3.5L/24 hours. Post-ATN diuresis may be resolving. D/c IV fluids today and will assess BP and UO. Encourage oral hydration and intake during this time  If hypotensive after dc'ing IV fluids as above, may need 250cc bolus of 0.45%saline  Lytes and vol status stable  Avoid nephrotoxic meds, ACEi/ARBs, NSAIDs. Dose meds for eGFR<15  BMP daily  Replete phos and Mg  Appreciate urology input  If BP and lytes stable after dc'ing IV fluids, pt will be stable for discharge from renal standpoint 7/11

## 2023-07-10 NOTE — DISCHARGE NOTE NURSING/CASE MANAGEMENT/SOCIAL WORK - PATIENT PORTAL LINK FT
You can access the FollowMyHealth Patient Portal offered by [facility] by registering at the following website: [email or web address]. By joining [employer]’s FollowMyHealth portal, you will also be able to view your health information using other applications (apps) compatible with our system.

## 2023-07-10 NOTE — PROGRESS NOTE ADULT - REASON FOR ADMISSION
ANIA secondary to bilateral ureteral obstruction and UTI
ANIA secondary to bilateral ureteral obstruction with UTI.
UTI iso Chronic folly
UTI iso Chronic folly
Acute ANIA in the setting of bilateral ureteral obstruction

## 2023-07-10 NOTE — PROGRESS NOTE ADULT - ATTENDING COMMENTS
72 yo man with acute rise in creat  to 4.2 in setting of obstructive uropathy- bilat hydro at level of bladder - thought to be related to ureteral strictures s/p RT- s/p bilat PCN by IR 7/6 ( unable to stent from below 7/5)  had post obstructive diuresis and required IVF- UO now slowing. as above okay to proceed with trial of dc IVF, encourage oral fluids  creat down to 2.094.6, K 5.2 CO2 20

## 2023-07-11 ENCOUNTER — APPOINTMENT (OUTPATIENT)
Dept: UROLOGY | Facility: CLINIC | Age: 73
End: 2023-07-11
Payer: MEDICARE

## 2023-07-11 ENCOUNTER — APPOINTMENT (OUTPATIENT)
Dept: UROLOGY | Facility: CLINIC | Age: 73
End: 2023-07-11

## 2023-07-11 LAB
BILIRUB UR QL STRIP: NORMAL
CLARITY UR: CLEAR
COLLECTION METHOD: NORMAL
GLUCOSE UR-MCNC: NORMAL
HCG UR QL: 0.2 EU/DL
HGB UR QL STRIP.AUTO: NORMAL
KETONES UR-MCNC: NORMAL
LEUKOCYTE ESTERASE UR QL STRIP: NORMAL
NITRITE UR QL STRIP: NORMAL
PH UR STRIP: 6.5
PROT UR STRIP-MCNC: NORMAL
SP GR UR STRIP: 1.01

## 2023-07-11 PROCEDURE — 99214 OFFICE O/P EST MOD 30 MIN: CPT

## 2023-07-11 NOTE — HISTORY OF PRESENT ILLNESS
[FreeTextEntry1] : 4/24/23:\par 73 yo male with hx of prostate cancer s/p brachytherapy at least 8 years presents for a second opinion regarding his chronic retention of urine.  He has had a catheter in place since January after going into urinary retention.  His catheter has been changed monthly.  He has failed multiple TOV.  He had a urodynamic test on March 6th which showed a desensate bladder with no detrusor activity.  He has a normal bladder capacity.  He had a cystoscopy in February 2022 which showed a small friable prostate without significant obstruction of the bladder neck.  He has been advised to learn CIC vs. have an SPT placed.  \par \par His catheter was most recently changed last week.  \par \par PSA from April 2023 was < 0.04.  He was recently treated for a proteus UTI. \par \par *************\par 5/15/23:\par Patient returns for follow up for CIC teaching.  \par \par CIC teaching was provided by the nursing staff.  Patient is not able to perform CIC, however his wife is able to perform CIC.  Unfortunately, the patient's wife works long days and is not home from early in the morning till later at night.  She is concerned he would not be able to manage with his urinary issues without a catheter while she is out.  \par \par **************\par 7/11/23:\par 72 yo male returns for follow up.  He was recently hospitalized for over a week with ANIA (Cr over 4).  He was found to have new B/L hydronephrosis of unclear etiology.  retrograde stent placement was not possible due to inability to identify the UOs as a result of significant bladder edema from a chronic portillo catheter.  B/L PCNs were placed by IR.  His Cr came down to 2 prior to discharge.  He presents today to discuss how to manage his B/L PCN tubes and portillo catheter.  \par \par The patient's wife has noted that the right PCN tube is draining more urine than the left.  The portillo catheter has been draining a small volume of blood tinged urine.  he has been holding his eliquis due to hematuria and the recently placed PCN tubes.

## 2023-07-11 NOTE — ASSESSMENT
[FreeTextEntry1] : 74 yo male with urinary retention and new B/L hydronephrosis of unclear etiology.  I discussed next step in management with the patient, his wife, and his daughter.  I would like to have IR place stent in an antegrade fashion so that I can assess his ureters for causes of hydronephrosis.  his last PSA from April 2023 was undetectable (<0.04) making it extremely unlikely that this obstruction is being caused by recurrent cancer.  It is more likely that he has radiation-induced strictures which have caused distal ureteral obstruction. UI will coordinate with IR to see if they can be present in the OR to try to pass a wire in an antegrade fashion to allow us to gain access into his ureter for further evaluation.  In the meantime he will maintain the portillo and B/L PCN tubes to gravity drainage.  I instructed them to hold the eliquis until the urine is no longer hematuric. \par \par Plan:\par 1. Will keep PCN and portillo to drainage\par 2. Will plan to scheudle joint case with IOR for antegrade placement of wire or stent in order to ureteroscopic evaluate the distal ureters for causes of hydro.

## 2023-07-11 NOTE — PHYSICAL EXAM
[General Appearance - Well Developed] : well developed [Normal Appearance] : normal appearance [Abdomen Soft] : soft [Abdomen Tenderness] : non-tender [Urethral Meatus] : meatus normal [Penis Abnormality] : normal circumcised penis [Skin Color & Pigmentation] : normal skin color and pigmentation [Heart Rate And Rhythm] : Heart rate and rhythm were normal [] : no respiratory distress [Oriented To Time, Place, And Person] : oriented to person, place, and time [Normal Station and Gait] : the gait and station were normal for the patient's age [No Focal Deficits] : no focal deficits [FreeTextEntry1] : Fol;ey catheter to gravity drainage

## 2023-07-12 LAB
CULTURE RESULTS: SIGNIFICANT CHANGE UP
CULTURE RESULTS: SIGNIFICANT CHANGE UP
SPECIMEN SOURCE: SIGNIFICANT CHANGE UP
SPECIMEN SOURCE: SIGNIFICANT CHANGE UP

## 2023-07-13 ENCOUNTER — NON-APPOINTMENT (OUTPATIENT)
Age: 73
End: 2023-07-13

## 2023-07-13 ENCOUNTER — APPOINTMENT (OUTPATIENT)
Dept: UROLOGY | Facility: CLINIC | Age: 73
End: 2023-07-13
Payer: MEDICARE

## 2023-07-13 PROCEDURE — 99214 OFFICE O/P EST MOD 30 MIN: CPT

## 2023-07-13 PROCEDURE — 99499A: CUSTOM

## 2023-07-13 NOTE — PHYSICAL EXAM
[General Appearance - Well Developed] : well developed [Normal Appearance] : normal appearance [Abdomen Soft] : soft [Skin Color & Pigmentation] : normal skin color and pigmentation [Heart Rate And Rhythm] : Heart rate and rhythm were normal [] : no respiratory distress [Oriented To Time, Place, And Person] : oriented to person, place, and time [Normal Station and Gait] : the gait and station were normal for the patient's age [No Focal Deficits] : no focal deficits [FreeTextEntry1] : Monterroso catheter to gravity drainage

## 2023-07-13 NOTE — ASSESSMENT
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  Will plan to go to OR with IR for antegrade access into the ureter to aid in retrograde ureteroscopy to identify cause of hydro.  We will the leave his portillo in place for now.  \par \par Plan:\par 1. Schedule cysto and B/L URS with intraop help from IR\par 2. PST\par 3. Medical and cardiac clearance

## 2023-07-13 NOTE — HISTORY OF PRESENT ILLNESS
[FreeTextEntry1] : 4/24/23:\par 73 yo male with hx of prostate cancer s/p brachytherapy at least 8 years presents for a second opinion regarding his chronic retention of urine.  He has had a catheter in place since January after going into urinary retention.  His catheter has been changed monthly.  He has failed multiple TOV.  He had a urodynamic test on March 6th which showed a desensate bladder with no detrusor activity.  He has a normal bladder capacity.  He had a cystoscopy in February 2022 which showed a small friable prostate without significant obstruction of the bladder neck.  He has been advised to learn CIC vs. have an SPT placed.  \par \par His catheter was most recently changed last week.  \par \par PSA from April 2023 was < 0.04.  He was recently treated for a proteus UTI. \par \par *************\par 5/15/23:\par Patient returns for follow up for CIC teaching.  \par \par CIC teaching was provided by the nursing staff.  Patient is not able to perform CIC, however his wife is able to perform CIC.  Unfortunately, the patient's wife works long days and is not home from early in the morning till later at night.  She is concerned he would not be able to manage with his urinary issues without a catheter while she is out.  \par \par **************\par 7/11/23:\par 74 yo male returns for follow up.  He was recently hospitalized for over a week with ANIA (Cr over 4).  He was found to have new B/L hydronephrosis of unclear etiology.  retrograde stent placement was not possible due to inability to identify the UOs as a result of significant bladder edema from a chronic portillo catheter.  B/L PCNs were placed by IR.  His Cr came down to 2 prior to discharge.  He presents today to discuss how to manage his B/L PCN tubes and portillo catheter.  \par \par The patient's wife has noted that the right PCN tube is draining more urine than the left.  The portillo catheter has been draining a small volume of blood tinged urine.  he has been holding his eliquis due to hematuria and the recently placed PCN tubes. \par \par ************\par 7/13/23:\par Patient returns today.  His Portillo catheter continues to drain small volume of bloody urine.  The output is bloodier today than Tuesday.

## 2023-07-14 DIAGNOSIS — Z79.82 LONG TERM (CURRENT) USE OF ASPIRIN: ICD-10-CM

## 2023-07-14 DIAGNOSIS — K21.9 GASTRO-ESOPHAGEAL REFLUX DISEASE WITHOUT ESOPHAGITIS: ICD-10-CM

## 2023-07-14 DIAGNOSIS — Z79.01 LONG TERM (CURRENT) USE OF ANTICOAGULANTS: ICD-10-CM

## 2023-07-14 DIAGNOSIS — D63.1 ANEMIA IN CHRONIC KIDNEY DISEASE: ICD-10-CM

## 2023-07-14 DIAGNOSIS — N28.1 CYST OF KIDNEY, ACQUIRED: ICD-10-CM

## 2023-07-14 DIAGNOSIS — T83.511A INFECTION AND INFLAMMATORY REACTION DUE TO INDWELLING URETHRAL CATHETER, INITIAL ENCOUNTER: ICD-10-CM

## 2023-07-14 DIAGNOSIS — N18.9 CHRONIC KIDNEY DISEASE, UNSPECIFIED: ICD-10-CM

## 2023-07-14 DIAGNOSIS — E66.01 MORBID (SEVERE) OBESITY DUE TO EXCESS CALORIES: ICD-10-CM
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
## 2023-07-14 DIAGNOSIS — N17.9 ACUTE KIDNEY FAILURE, UNSPECIFIED: ICD-10-CM
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
## 2023-07-14 DIAGNOSIS — N99.89 OTHER POSTPROCEDURAL COMPLICATIONS AND DISORDERS OF GENITOURINARY SYSTEM: ICD-10-CM

## 2023-07-14 DIAGNOSIS — I48.91 UNSPECIFIED ATRIAL FIBRILLATION: ICD-10-CM

## 2023-07-14 DIAGNOSIS — B95.62 METHICILLIN RESISTANT STAPHYLOCOCCUS AUREUS INFECTION AS THE CAUSE OF DISEASES CLASSIFIED ELSEWHERE: ICD-10-CM
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
## 2023-07-14 DIAGNOSIS — N13.6 PYONEPHROSIS: ICD-10-CM

## 2023-07-14 DIAGNOSIS — N31.9 NEUROMUSCULAR DYSFUNCTION OF BLADDER, UNSPECIFIED: ICD-10-CM

## 2023-07-14 DIAGNOSIS — Z85.46 PERSONAL HISTORY OF MALIGNANT NEOPLASM OF PROSTATE: ICD-10-CM

## 2023-07-14 DIAGNOSIS — N40.0 BENIGN PROSTATIC HYPERPLASIA WITHOUT LOWER URINARY TRACT SYMPTOMS: ICD-10-CM

## 2023-07-14 DIAGNOSIS — I12.9 HYPERTENSIVE CHRONIC KIDNEY DISEASE WITH STAGE 1 THROUGH STAGE 4 CHRONIC KIDNEY DISEASE, OR UNSPECIFIED CHRONIC KIDNEY DISEASE: ICD-10-CM

## 2023-07-14 DIAGNOSIS — E87.20 ACIDOSIS, UNSPECIFIED: ICD-10-CM

## 2023-07-14 LAB
ANION GAP SERPL CALC-SCNC: 13 MMOL/L
APTT BLD: 36.4 SEC
BUN SERPL-MCNC: 24 MG/DL
CALCIUM SERPL-MCNC: 10 MG/DL
CHLORIDE SERPL-SCNC: 104 MMOL/L
CO2 SERPL-SCNC: 21 MMOL/L
CREAT SERPL-MCNC: 1.52 MG/DL
EGFR: 47 ML/MIN/1.73M2
GLUCOSE SERPL-MCNC: 95 MG/DL
INR PPP: 1.23 RATIO
POTASSIUM SERPL-SCNC: 4.2 MMOL/L
PT BLD: 14.3 SEC
SODIUM SERPL-SCNC: 139 MMOL/L

## 2023-07-17 RX ORDER — FUROSEMIDE 40 MG
1 TABLET ORAL
Refills: 0 | DISCHARGE

## 2023-07-17 RX ORDER — CYPROHEPTADINE HYDROCHLORIDE 4 MG/1
1 TABLET ORAL
Refills: 0 | DISCHARGE

## 2023-07-17 RX ORDER — OXYBUTYNIN CHLORIDE 5 MG
1 TABLET ORAL
Refills: 0 | DISCHARGE

## 2023-07-17 RX ORDER — LINACLOTIDE 145 UG/1
1 CAPSULE, GELATIN COATED ORAL
Refills: 0 | DISCHARGE

## 2023-07-17 RX ORDER — FESOTERODINE FUMARATE 8 MG/1
1 TABLET, FILM COATED, EXTENDED RELEASE ORAL
Refills: 0 | DISCHARGE

## 2023-07-17 RX ORDER — POLYETHYLENE GLYCOL 3350 17 G/17G
17 POWDER, FOR SOLUTION ORAL
Refills: 0 | DISCHARGE

## 2023-07-17 RX ORDER — METOPROLOL TARTRATE 50 MG
1 TABLET ORAL
Refills: 0 | DISCHARGE

## 2023-07-17 RX ORDER — FINASTERIDE 5 MG/1
1 TABLET, FILM COATED ORAL
Refills: 0 | DISCHARGE

## 2023-07-17 RX ORDER — APIXABAN 2.5 MG/1
1 TABLET, FILM COATED ORAL
Refills: 0 | DISCHARGE

## 2023-07-17 RX ORDER — DOCUSATE SODIUM 100 MG
1 CAPSULE ORAL
Refills: 0 | DISCHARGE

## 2023-07-17 RX ORDER — DILTIAZEM HCL 120 MG
1 CAPSULE, EXT RELEASE 24 HR ORAL
Refills: 0 | DISCHARGE

## 2023-07-17 RX ORDER — TAMSULOSIN HYDROCHLORIDE 0.4 MG/1
1 CAPSULE ORAL
Refills: 0 | DISCHARGE

## 2023-07-17 RX ORDER — ASPIRIN/CALCIUM CARB/MAGNESIUM 324 MG
1 TABLET ORAL
Refills: 0 | DISCHARGE

## 2023-07-18 ENCOUNTER — APPOINTMENT (OUTPATIENT)
Dept: NEPHROLOGY | Facility: CLINIC | Age: 73
End: 2023-07-18
Payer: MEDICARE

## 2023-07-18 ENCOUNTER — LABORATORY RESULT (OUTPATIENT)
Age: 73
End: 2023-07-18

## 2023-07-18 VITALS — WEIGHT: 208.11 LBS

## 2023-07-18 DIAGNOSIS — N13.9 OBSTRUCTIVE AND REFLUX UROPATHY, UNSPECIFIED: ICD-10-CM

## 2023-07-18 LAB
BACTERIA UR CULT: ABNORMAL
BACTERIA UR CULT: ABNORMAL

## 2023-07-18 PROCEDURE — 99205 OFFICE O/P NEW HI 60 MIN: CPT | Mod: 25

## 2023-07-18 PROCEDURE — 36415 COLL VENOUS BLD VENIPUNCTURE: CPT

## 2023-07-18 PROCEDURE — 93000 ELECTROCARDIOGRAM COMPLETE: CPT

## 2023-07-20 ENCOUNTER — TRANSCRIPTION ENCOUNTER (OUTPATIENT)
Age: 73
End: 2023-07-20

## 2023-07-23 LAB
24R-OH-CALCIDIOL SERPL-MCNC: 64.7 PG/ML
25(OH)D3 SERPL-MCNC: 19 NG/ML
ALBUMIN MFR SERPL ELPH: 49.3 %
ALBUMIN SERPL ELPH-MCNC: 3.9 G/DL
ALBUMIN SERPL-MCNC: 3.3 G/DL
ALBUMIN/GLOB SERPL: 1 RATIO
ALDOSTERONE SERUM: 16.5 NG/DL
ALP BLD-CCNC: 106 U/L
ALP BONE SERPL-MCNC: 13.1 UG/L
ALPHA1 GLOB MFR SERPL ELPH: 6.5 %
ALPHA1 GLOB SERPL ELPH-MCNC: 0.4 G/DL
ALPHA2 GLOB MFR SERPL ELPH: 14.1 %
ALPHA2 GLOB SERPL ELPH-MCNC: 0.9 G/DL
ALT SERPL-CCNC: 19 U/L
ANA SER IF-ACNC: NEGATIVE
ANION GAP SERPL CALC-SCNC: 14 MMOL/L
APPEARANCE: ABNORMAL
AST SERPL-CCNC: 24 U/L
B-GLOBULIN MFR SERPL ELPH: 12.5 %
B-GLOBULIN SERPL ELPH-MCNC: 0.8 G/DL
BACTERIA UR CULT: NORMAL
BACTERIA: ABNORMAL /HPF
BILIRUB SERPL-MCNC: 0.2 MG/DL
BILIRUBIN URINE: NEGATIVE
BLOOD URINE: ABNORMAL
BUN SERPL-MCNC: 21 MG/DL
C3 SERPL-MCNC: 129 MG/DL
C4 SERPL-MCNC: 34 MG/DL
CALCIUM SERPL-MCNC: 9.8 MG/DL
CALCIUM SERPL-MCNC: 9.8 MG/DL
CAST: 0 /LPF
CHLORIDE SERPL-SCNC: 104 MMOL/L
CHOLEST SERPL-MCNC: 145 MG/DL
CO2 SERPL-SCNC: 21 MMOL/L
COLLAGEN CTX SERPL-MCNC: 808 PG/ML
COLOR: YELLOW
CREAT SERPL-MCNC: 1.32 MG/DL
CREAT SPEC-SCNC: 57 MG/DL
CREAT/PROT UR: 1.7 RATIO
CYSTATIN C SERPL-MCNC: 1.68 MG/L
DEPRECATED KAPPA LC FREE/LAMBDA SER: 2.09 RATIO
EGFR: 56 ML/MIN/1.73M2
EPITHELIAL CELLS: 1 /HPF
ESTIMATED AVERAGE GLUCOSE: 140 MG/DL
FERRITIN SERPL-MCNC: 109 NG/ML
FOLATE SERPL-MCNC: 8.4 NG/ML
GAMMA GLOB FLD ELPH-MCNC: 1.2 G/DL
GAMMA GLOB MFR SERPL ELPH: 17.6 %
GFR/BSA.PRED SERPLBLD CYS-BASED-ARV: 37 ML/MIN/1.73M2
GLUCOSE QUALITATIVE U: NEGATIVE MG/DL
GLUCOSE SERPL-MCNC: 104 MG/DL
HBA1C MFR BLD HPLC: 6.5 %
HBV SURFACE AB SER QL: NONREACTIVE
HBV SURFACE AG SER QL: NONREACTIVE
HCV AB SER QL: NONREACTIVE
HCV S/CO RATIO: 0.1 S/CO
HCYS SERPL-MCNC: 20.9 UMOL/L
HDLC SERPL-MCNC: 44 MG/DL
IGA SER QL IEP: 228 MG/DL
IGG SER QL IEP: 1332 MG/DL
IGM SER QL IEP: 64 MG/DL
INTERPRETATION SERPL IEP-IMP: NORMAL
IRON SATN MFR SERPL: 7 %
IRON SERPL-MCNC: 17 UG/DL
KAPPA LC CSF-MCNC: 3.2 MG/DL
KAPPA LC SERPL-MCNC: 6.7 MG/DL
KETONES URINE: NEGATIVE MG/DL
LDLC SERPL CALC-MCNC: 83 MG/DL
LEUKOCYTE ESTERASE URINE: ABNORMAL
M PROTEIN SPEC IFE-MCNC: NORMAL
MAGNESIUM SERPL-MCNC: 1.4 MG/DL
METHYLMALONATE SERPL-SCNC: 267 NMOL/L
MICROSCOPIC-UA: NORMAL
NITRITE URINE: POSITIVE
NONHDLC SERPL-MCNC: 102 MG/DL
NT-PROBNP SERPL-MCNC: 3017 PG/ML
PARATHYROID HORMONE INTACT: 185 PG/ML
PH URINE: 6
PHOSPHATE SERPL-MCNC: 2 MG/DL
POTASSIUM SERPL-SCNC: 3.5 MMOL/L
PROT SERPL-MCNC: 6.7 G/DL
PROT UR-MCNC: 96 MG/DL
PROTEIN URINE: 100 MG/DL
PSA SERPL-MCNC: <0.01 NG/ML
RED BLOOD CELLS URINE: 42 /HPF
RHEUMATOID FACT SER QL: 10 IU/ML
SODIUM ?TM SUB UR QN: 28 MMOL/L
SODIUM SERPL-SCNC: 138 MMOL/L
SPECIFIC GRAVITY URINE: 1.02
T3FREE SERPL-MCNC: 2.54 PG/ML
T3RU NFR SERPL: 0.9 TBI
T4 FREE SERPL-MCNC: 1.3 NG/DL
T4 SERPL-MCNC: 6.7 UG/DL
THYROGLOB AB SERPL-ACNC: <20 IU/ML
THYROPEROXIDASE AB SERPL IA-ACNC: 32.7 IU/ML
TIBC SERPL-MCNC: 252 UG/DL
TRIGL SERPL-MCNC: 102 MG/DL
TSH SERPL-ACNC: 2.67 UIU/ML
UIBC SERPL-MCNC: 235 UG/DL
URATE SERPL-MCNC: 6.4 MG/DL
UROBILINOGEN URINE: 0.2 MG/DL
VIT B12 SERPL-MCNC: 398 PG/ML
WHITE BLOOD CELLS URINE: 61 /HPF

## 2023-07-23 RX ORDER — METOPROLOL SUCCINATE 100 MG/1
100 TABLET, EXTENDED RELEASE ORAL DAILY
Qty: 90 | Refills: 3 | Status: ACTIVE | COMMUNITY
Start: 2023-03-06 | End: 1900-01-01

## 2023-07-24 RX ORDER — OMEPRAZOLE 40 MG/1
40 CAPSULE, DELAYED RELEASE ORAL
Qty: 90 | Refills: 3 | Status: ACTIVE | COMMUNITY
Start: 2023-07-23 | End: 1900-01-01

## 2023-07-25 ENCOUNTER — APPOINTMENT (OUTPATIENT)
Dept: UROLOGY | Facility: CLINIC | Age: 73
End: 2023-07-25

## 2023-07-27 ENCOUNTER — APPOINTMENT (OUTPATIENT)
Dept: UROLOGY | Facility: CLINIC | Age: 73
End: 2023-07-27

## 2023-07-27 ENCOUNTER — NON-APPOINTMENT (OUTPATIENT)
Age: 73
End: 2023-07-27

## 2023-07-28 ENCOUNTER — APPOINTMENT (OUTPATIENT)
Dept: UROLOGY | Facility: CLINIC | Age: 73
End: 2023-07-28
Payer: MEDICARE

## 2023-07-28 VITALS
SYSTOLIC BLOOD PRESSURE: 116 MMHG | TEMPERATURE: 98.1 F | OXYGEN SATURATION: 96 % | DIASTOLIC BLOOD PRESSURE: 74 MMHG | HEART RATE: 96 BPM

## 2023-07-28 DIAGNOSIS — R31.0 GROSS HEMATURIA: ICD-10-CM

## 2023-07-28 PROCEDURE — 99213 OFFICE O/P EST LOW 20 MIN: CPT

## 2023-07-28 NOTE — ASSESSMENT
[FreeTextEntry1] : 72 yo male with urinary retention and B/L hydro s/p B/L PCN tube placement.  he is scheduled for exploratory endoscopic surgery to assess the nature of his ureteral obstruction.  He will start cefpodoxime for positive urine cx.  \par \par Plan:\par 1. Proceed as planned with surgery on 8/2\par 2. Cefpodoxime for positive Ucx\par 3. Con Monterroso and B/L PCN tubes to gravity drainage

## 2023-07-28 NOTE — PHYSICAL EXAM
[General Appearance - Well Developed] : well developed [Abdomen Soft] : soft [FreeTextEntry1] : Monterroso to gravity drainage [Skin Color & Pigmentation] : normal skin color and pigmentation [Heart Rate And Rhythm] : Heart rate and rhythm were normal [] : no respiratory distress [Oriented To Time, Place, And Person] : oriented to person, place, and time [Normal Station and Gait] : the gait and station were normal for the patient's age [No Focal Deficits] : no focal deficits

## 2023-07-28 NOTE — HISTORY OF PRESENT ILLNESS
[FreeTextEntry1] : 4/24/23:\par 73 yo male with hx of prostate cancer s/p brachytherapy at least 8 years presents for a second opinion regarding his chronic retention of urine.  He has had a catheter in place since January after going into urinary retention.  His catheter has been changed monthly.  He has failed multiple TOV.  He had a urodynamic test on March 6th which showed a desensate bladder with no detrusor activity.  He has a normal bladder capacity.  He had a cystoscopy in February 2022 which showed a small friable prostate without significant obstruction of the bladder neck.  He has been advised to learn CIC vs. have an SPT placed.  \par \par His catheter was most recently changed last week.  \par \par PSA from April 2023 was < 0.04.  He was recently treated for a proteus UTI. \par \par *************\par 5/15/23:\par Patient returns for follow up for CIC teaching.  \par \par CIC teaching was provided by the nursing staff.  Patient is not able to perform CIC, however his wife is able to perform CIC.  Unfortunately, the patient's wife works long days and is not home from early in the morning till later at night.  She is concerned he would not be able to manage with his urinary issues without a catheter while she is out.  \par \par **************\par 7/11/23:\par 72 yo male returns for follow up.  He was recently hospitalized for over a week with ANIA (Cr over 4).  He was found to have new B/L hydronephrosis of unclear etiology.  retrograde stent placement was not possible due to inability to identify the UOs as a result of significant bladder edema from a chronic portillo catheter.  B/L PCNs were placed by IR.  His Cr came down to 2 prior to discharge.  He presents today to discuss how to manage his B/L PCN tubes and portillo catheter.  \par \par The patient's wife has noted that the right PCN tube is draining more urine than the left.  The portillo catheter has been draining a small volume of blood tinged urine.  he has been holding his eliquis due to hematuria and the recently placed PCN tubes. \par \par ************\par 7/13/23:\par Patient returns today.  His Portillo catheter continues to drain small volume of bloody urine.  The output is bloodier today than Tuesday.  \par \par ****************\par 7/28/23:\par Patient returns with concern that the right nephrostomy tube has been leaking.  he is otherwise at his baseline with B/L PCN tubes draining clear yellow urine.   The portillo catheter is still hematuric but has lightened since his last visit.  He is scheduled for surgery next Wednesday \par \par Right PCN drainage bag changed. \par \par He had positive Ucx from both PCN tubes from his last visit.

## 2023-08-01 ENCOUNTER — APPOINTMENT (OUTPATIENT)
Dept: HEART AND VASCULAR | Facility: CLINIC | Age: 73
End: 2023-08-01
Payer: MEDICARE

## 2023-08-01 ENCOUNTER — TRANSCRIPTION ENCOUNTER (OUTPATIENT)
Age: 73
End: 2023-08-01

## 2023-08-01 VITALS
WEIGHT: 217 LBS | OXYGEN SATURATION: 97 % | BODY MASS INDEX: 35.72 KG/M2 | HEIGHT: 65.5 IN | DIASTOLIC BLOOD PRESSURE: 69 MMHG | SYSTOLIC BLOOD PRESSURE: 114 MMHG | HEART RATE: 105 BPM

## 2023-08-01 DIAGNOSIS — Z87.898 PERSONAL HISTORY OF OTHER SPECIFIED CONDITIONS: ICD-10-CM

## 2023-08-01 DIAGNOSIS — I48.91 UNSPECIFIED ATRIAL FIBRILLATION: ICD-10-CM

## 2023-08-01 DIAGNOSIS — E11.29 TYPE 2 DIABETES MELLITUS WITH OTHER DIABETIC KIDNEY COMPLICATION: ICD-10-CM

## 2023-08-01 DIAGNOSIS — N18.9 CHRONIC KIDNEY DISEASE, UNSPECIFIED: ICD-10-CM

## 2023-08-01 DIAGNOSIS — Z86.79 PERSONAL HISTORY OF OTHER DISEASES OF THE CIRCULATORY SYSTEM: ICD-10-CM

## 2023-08-01 DIAGNOSIS — I10 ESSENTIAL (PRIMARY) HYPERTENSION: ICD-10-CM

## 2023-08-01 DIAGNOSIS — N13.9 OBSTRUCTIVE AND REFLUX UROPATHY, UNSPECIFIED: ICD-10-CM

## 2023-08-01 DIAGNOSIS — G47.33 OBSTRUCTIVE SLEEP APNEA (ADULT) (PEDIATRIC): ICD-10-CM

## 2023-08-01 DIAGNOSIS — N18.30 CHRONIC KIDNEY DISEASE, STAGE 3 UNSPECIFIED: ICD-10-CM

## 2023-08-01 DIAGNOSIS — E66.9 OBESITY, UNSPECIFIED: ICD-10-CM

## 2023-08-01 PROCEDURE — 99205 OFFICE O/P NEW HI 60 MIN: CPT | Mod: 25

## 2023-08-01 PROCEDURE — 93000 ELECTROCARDIOGRAM COMPLETE: CPT

## 2023-08-01 RX ORDER — CEFPODOXIME PROXETIL 200 MG/1
200 TABLET, FILM COATED ORAL
Refills: 0 | Status: ACTIVE | COMMUNITY
Start: 2023-08-01

## 2023-08-01 RX ORDER — OMEPRAZOLE 10 MG/1
1 CAPSULE, DELAYED RELEASE ORAL
Refills: 0 | DISCHARGE

## 2023-08-01 RX ORDER — OLOPATADINE HYDROCHLORIDE 1 MG/ML
1 SOLUTION/ DROPS OPHTHALMIC
Refills: 0 | DISCHARGE

## 2023-08-01 RX ORDER — CEFPODOXIME PROXETIL 200 MG/1
200 TABLET, FILM COATED ORAL
Qty: 14 | Refills: 0 | Status: DISCONTINUED | COMMUNITY
Start: 2023-07-28 | End: 2023-08-01

## 2023-08-01 RX ORDER — BETAXOLOL HCL 0.25 %
1 SUSPENSION, DROPS(FINAL DOSAGE FORM)(ML) OPHTHALMIC (EYE)
Refills: 0 | DISCHARGE

## 2023-08-01 NOTE — ASU PATIENT PROFILE, ADULT - NSICDXPASTMEDICALHX_GEN_ALL_CORE_FT
PAST MEDICAL HISTORY:  H/O: glaucoma     HTN (hypertension)      PAST MEDICAL HISTORY:  GERD (gastroesophageal reflux disease)     H/O: glaucoma     HTN (hypertension)     Prostate CA

## 2023-08-01 NOTE — ASU PATIENT PROFILE, ADULT - URINARY CATHETER
nephrostomy tube/yes Burow's Advancement Flap Text: The defect edges were debeveled with a #15 scalpel blade.  Given the location of the defect and the proximity to free margins a Burow's advancement flap was deemed most appropriate.  Using a sterile surgical marker, the appropriate advancement flap was drawn incorporating the defect and placing the expected incisions within the relaxed skin tension lines where possible.    The area thus outlined was incised deep to adipose tissue with a #15 scalpel blade.  The skin margins were undermined to an appropriate distance in all directions utilizing iris scissors.

## 2023-08-01 NOTE — REASON FOR VISIT
[Other: ____] : [unfilled] [FreeTextEntry1] : ================================= Diagnostic Tests: ------------------------------------------------- EC23:  atrial fibrillation with RVR and occasional aberrantly conducted beats, NSST.  -------------------------------------------------- Echo: 22: EF 66%, aortic sclerosis, mild TR. -------------------------------------------------- Stress tests: 22: regadenoson MPI: E 68%, normal perfusion.  --------------------------------------------------- Lower extremity veins: 22: sono: left: no DVT, s/p venous ablation GSV, AAV, + SSV reflux.

## 2023-08-01 NOTE — ASSESSMENT
[FreeTextEntry1] : 1. Atrial fibrillation, persistent: [id number] Score 4:          - systemic anticoagulation with Eliquis 5mg po bid being held pending resolution of hematuria (will restart next visit)          - discussed with patient avoidance of ASA and NSAIDS as well as need for bleeding surveillance          - will follow LV function with serial echocardiograms  2. HTN: BP at ACC/AHA 2017 guideline target:        - continue metorpolol succinate 100mg po qd         - ? if taking diltiazem ER 180mg po qd (if so, will likely discontinue next visit)         - continue enalapril/HCT 10/25mg po qd  3. DM2:  A1c 6.5% (07/18/23):          - discussed with patient therapeutic lifestyle changes to improve glucose metabolism   4. CKD III: secondary to obstructive uropathy: Cr 1.32, eGFR via cystatin C 37 (07/18/23):          - follow up with nephrologist, Mike Polanco MD         - follow up with urologist, Josh Robledo MD         - avoid nephrotoxic agents  5. r/o NABILA: likely:         - will order a home sleep study today  6. Obesity: BMI: 35.6:          - we had an extensive discussion about therapeutic lifestyle changes to promote increased cardiovascular fitness and achieve goal weight   7. Preoperative for exploratory endoscopic surgery to identify the etiology of his ureteral obstruction tomorrow (08/02/23) with Josh Robledo MD:          - he has no known atherosclerotic disease and denies chest pain          - he has < 4 METS of exertional capacity but had a normal MPI stress test on 03/31/22          - he is medically optimized from a CV standpoint          - he may, therefore, proceed with surgery without cardiac contraindications   I spent > 60 minutes of total time on this visit, including time spent face-to-face and non-face-to-face.  During this time, I took a relevant history and examined the patient.  I reviewed relevant portions of the medical record and formulated a differential diagnosis and plan.  I explained the relevant cardiac diagnoses to the patient, as well as the work up and management plan.  I answered all questions related to the patient's cardiac conditions.

## 2023-08-01 NOTE — REVIEW OF SYSTEMS
[Negative] : Heme/Lymph [SOB] : shortness of breath [Weight Gain (___ Lbs)] : [unfilled] ~Ulb weight gain [Feeling Fatigued] : feeling fatigued [Dyspnea on exertion] : dyspnea during exertion [Lower Ext Edema] : lower extremity edema [Snoring] : snoring [Abdominal Pain] : abdominal pain [Urinary Frequency] : urinary frequency [Hematuria] : hematuria

## 2023-08-01 NOTE — HISTORY OF PRESENT ILLNESS
[FreeTextEntry1] : Mr. Peñaloza presents for initial evaluation and management of prostate cancer, (s/p brachytherapy), obstructive uropathy, CKD III, atonic bladder, chronic venous insufficiency, obesity, DM2, and HTN.  He has a history of prostate cancer and underwent brachytherapy.  He has had chronic urinary retention since that time and has failed multiple voiding trials.  On 03/06/23, he had urodynamics which revealed an atonic bladder.  He has bilateral suprapubic cystostomy tubes at present.  He is preoperative for exploratory endoscopic surgery to identify the etiology of his ureteral obstruction tomorrow (08/02/23) with Josh Robledo MD.  He is followed by another cardiologist, Darryl Jessica MD, and was seen on 07/26/23, who already provided medical clearance.  He also ordered a mobile cardiac telemetry monitor which he is currently wearing.  His systemic anticoagulation has been held secondary to hematuria.  He has complaints of BUSH to 1 flight of stairs and is very sedentary.  He denies chest pain.  He admits to snoring and daytime somnolence.

## 2023-08-01 NOTE — PHYSICAL EXAM
[No Acute Distress] : no acute distress [Normal Conjunctiva] : normal conjunctiva [Normal Venous Pressure] : normal venous pressure [No Carotid Bruit] : no carotid bruit [No Rub] : no rub [No Gallop] : no gallop [Clear Lung Fields] : clear lung fields [Good Air Entry] : good air entry [No Respiratory Distress] : no respiratory distress  [Soft] : abdomen soft [Non Tender] : non-tender [No Masses/organomegaly] : no masses/organomegaly [Normal Bowel Sounds] : normal bowel sounds [Normal Gait] : normal gait [No Cyanosis] : no cyanosis [No Clubbing] : no clubbing [No Varicosities] : no varicosities [No Rash] : no rash [No Skin Lesions] : no skin lesions [Moves all extremities] : moves all extremities [No Focal Deficits] : no focal deficits [Normal Speech] : normal speech [Alert and Oriented] : alert and oriented [Normal memory] : normal memory [Obese] : obese [Ill-Appearing] : ill-appearing [de-identified] : irregularly irregular rhythm, 2/5 MICKY LUSB [de-identified] : + b/l PCNs [de-identified] : 1+ b/l LE edema

## 2023-08-01 NOTE — ASU PATIENT PROFILE, ADULT - FALL HARM RISK - HARM RISK INTERVENTIONS
Assistance with ambulation/Assistance OOB with selected safe patient handling equipment/Communicate Risk of Fall with Harm to all staff/Discuss with provider need for PT consult/Monitor gait and stability/Provide patient with walking aids - walker, cane, crutches/Reinforce activity limits and safety measures with patient and family/Sit up slowly, dangle for a short time, stand at bedside before walking/Tailored Fall Risk Interventions/Use of alarms - bed, chair and/or voice tab/Visual Cue: Yellow wristband and red socks/Bed in lowest position, wheels locked, appropriate side rails in place/Call bell, personal items and telephone in reach/Instruct patient to call for assistance before getting out of bed or chair/Non-slip footwear when patient is out of bed/[place] to call system/Physically safe environment - no spills, clutter or unnecessary equipment/Purposeful Proactive Rounding/Room/bathroom lighting operational, light cord in reach

## 2023-08-02 ENCOUNTER — INPATIENT (INPATIENT)
Facility: HOSPITAL | Age: 73
LOS: 0 days | Discharge: HOME | DRG: 658 | End: 2023-08-03
Attending: UROLOGY | Admitting: UROLOGY
Payer: COMMERCIAL

## 2023-08-02 ENCOUNTER — TRANSCRIPTION ENCOUNTER (OUTPATIENT)
Age: 73
End: 2023-08-02

## 2023-08-02 ENCOUNTER — APPOINTMENT (OUTPATIENT)
Dept: UROLOGY | Facility: HOSPITAL | Age: 73
End: 2023-08-02

## 2023-08-02 ENCOUNTER — RESULT REVIEW (OUTPATIENT)
Age: 73
End: 2023-08-02

## 2023-08-02 VITALS
OXYGEN SATURATION: 98 % | HEART RATE: 86 BPM | HEIGHT: 68 IN | SYSTOLIC BLOOD PRESSURE: 129 MMHG | RESPIRATION RATE: 16 BRPM | TEMPERATURE: 98 F | DIASTOLIC BLOOD PRESSURE: 65 MMHG | WEIGHT: 218.48 LBS

## 2023-08-02 DIAGNOSIS — Z98.890 OTHER SPECIFIED POSTPROCEDURAL STATES: Chronic | ICD-10-CM

## 2023-08-02 DIAGNOSIS — N13.5 CROSSING VESSEL AND STRICTURE OF URETER WITHOUT HYDRONEPHROSIS: ICD-10-CM

## 2023-08-02 DIAGNOSIS — Z90.49 ACQUIRED ABSENCE OF OTHER SPECIFIED PARTS OF DIGESTIVE TRACT: Chronic | ICD-10-CM

## 2023-08-02 PROCEDURE — 88305 TISSUE EXAM BY PATHOLOGIST: CPT | Mod: 26

## 2023-08-02 PROCEDURE — 50435 EXCHANGE NEPHROSTOMY CATH: CPT | Mod: RT,59

## 2023-08-02 PROCEDURE — 52204 CYSTOSCOPY W/BIOPSY(S): CPT

## 2023-08-02 PROCEDURE — 52001 CYSTO W/IRRG&EVAC MLT CLOTS: CPT | Mod: 59

## 2023-08-02 PROCEDURE — 50434 CONVERT NEPHROSTOMY CATHETER: CPT | Mod: LT

## 2023-08-02 DEVICE — GUIDEWIRE TERUMO GLIDEWIRE ANGLED .035" X 150CM STANDARD: Type: IMPLANTABLE DEVICE | Status: FUNCTIONAL

## 2023-08-02 DEVICE — CATH DRAIN ULTRATHANE 25CM 10F: Type: IMPLANTABLE DEVICE | Status: FUNCTIONAL

## 2023-08-02 DEVICE — GUIDEWIRE AMPLATZ SUPER-STIFF STRAIGHT .035" X 75CM: Type: IMPLANTABLE DEVICE | Status: FUNCTIONAL

## 2023-08-02 DEVICE — GUIDEWIRE AMPLATZ SUPER-STIFF STRAIGHT .035" X 145CM: Type: IMPLANTABLE DEVICE | Status: FUNCTIONAL

## 2023-08-02 DEVICE — IMPLANTABLE DEVICE: Type: IMPLANTABLE DEVICE | Status: FUNCTIONAL

## 2023-08-02 DEVICE — GUIDEWIRE SENSOR DUAL-FLEX NITINOL STRAIGHT .035" X 150CM: Type: IMPLANTABLE DEVICE | Status: FUNCTIONAL

## 2023-08-02 DEVICE — URETERAL CATH FLEXIMA OPEN END 5FR 70CM: Type: IMPLANTABLE DEVICE | Status: FUNCTIONAL

## 2023-08-02 DEVICE — URETERAL CATH DUAL LUMEN 10FR 54CM: Type: IMPLANTABLE DEVICE | Status: FUNCTIONAL

## 2023-08-02 RX ORDER — OMEPRAZOLE 10 MG/1
1 CAPSULE, DELAYED RELEASE ORAL
Refills: 0 | DISCHARGE

## 2023-08-02 RX ORDER — ACETAMINOPHEN 500 MG
1000 TABLET ORAL EVERY 8 HOURS
Refills: 0 | Status: DISCONTINUED | OUTPATIENT
Start: 2023-08-02 | End: 2023-08-03

## 2023-08-02 RX ORDER — PANTOPRAZOLE SODIUM 20 MG/1
40 TABLET, DELAYED RELEASE ORAL
Refills: 0 | Status: DISCONTINUED | OUTPATIENT
Start: 2023-08-02 | End: 2023-08-03

## 2023-08-02 RX ORDER — ENOXAPARIN SODIUM 100 MG/ML
40 INJECTION SUBCUTANEOUS EVERY 24 HOURS
Refills: 0 | Status: DISCONTINUED | OUTPATIENT
Start: 2023-08-03 | End: 2023-08-03

## 2023-08-02 RX ORDER — DILTIAZEM HCL 120 MG
180 CAPSULE, EXT RELEASE 24 HR ORAL DAILY
Refills: 0 | Status: DISCONTINUED | OUTPATIENT
Start: 2023-08-02 | End: 2023-08-03

## 2023-08-02 RX ORDER — FINASTERIDE 5 MG/1
5 TABLET, FILM COATED ORAL DAILY
Refills: 0 | Status: DISCONTINUED | OUTPATIENT
Start: 2023-08-02 | End: 2023-08-03

## 2023-08-02 RX ORDER — METOPROLOL TARTRATE 50 MG
1 TABLET ORAL
Refills: 0 | DISCHARGE

## 2023-08-02 RX ORDER — TAMSULOSIN HYDROCHLORIDE 0.4 MG/1
0.4 CAPSULE ORAL AT BEDTIME
Refills: 0 | Status: DISCONTINUED | OUTPATIENT
Start: 2023-08-02 | End: 2023-08-03

## 2023-08-02 RX ORDER — METOPROLOL TARTRATE 50 MG
100 TABLET ORAL
Refills: 0 | Status: DISCONTINUED | OUTPATIENT
Start: 2023-08-02 | End: 2023-08-03

## 2023-08-02 RX ORDER — SODIUM CHLORIDE 9 MG/ML
1000 INJECTION, SOLUTION INTRAVENOUS
Refills: 0 | Status: DISCONTINUED | OUTPATIENT
Start: 2023-08-02 | End: 2023-08-03

## 2023-08-02 RX ORDER — CEFTRIAXONE 500 MG/1
1000 INJECTION, POWDER, FOR SOLUTION INTRAMUSCULAR; INTRAVENOUS EVERY 24 HOURS
Refills: 0 | Status: DISCONTINUED | OUTPATIENT
Start: 2023-08-02 | End: 2023-08-03

## 2023-08-02 RX ORDER — OLOPATADINE HYDROCHLORIDE 1 MG/ML
1 SOLUTION/ DROPS OPHTHALMIC
Refills: 0 | DISCHARGE

## 2023-08-02 RX ORDER — TIMOLOL 0.5 %
1 DROPS OPHTHALMIC (EYE)
Refills: 0 | DISCHARGE

## 2023-08-02 RX ADMIN — SODIUM CHLORIDE 50 MILLILITER(S): 9 INJECTION, SOLUTION INTRAVENOUS at 18:47

## 2023-08-02 RX ADMIN — CEFTRIAXONE 100 MILLIGRAM(S): 500 INJECTION, POWDER, FOR SOLUTION INTRAMUSCULAR; INTRAVENOUS at 18:48

## 2023-08-02 RX ADMIN — TAMSULOSIN HYDROCHLORIDE 0.4 MILLIGRAM(S): 0.4 CAPSULE ORAL at 21:42

## 2023-08-02 RX ADMIN — Medication 1000 MILLIGRAM(S): at 21:20

## 2023-08-02 RX ADMIN — Medication 100 MILLIGRAM(S): at 18:36

## 2023-08-02 NOTE — PROGRESS NOTE ADULT - PROBLEM SELECTOR PLAN 1
-stable  -OOB  -IS, SCD's  -Diet: regular  -Antibx: ceftriaxone  -I's & O's  -pain control  -IVF's  -continue present care

## 2023-08-02 NOTE — PATIENT PROFILE ADULT - FUNCTIONAL ASSESSMENT - BASIC MOBILITY ASSESSMENT TYPE
Denies known Latex allergy or symptoms of Latex sensitivity.   Medications reviewed with patient:  No changes made.  Tobacco use verified.  Medical and Surgical history reviewed:  No changes made.      Preferred Pharmacy:    [id number] - [facility] [address] [address] [address] [facility]  [address] [facility] [address]  Phone: [phone] Fax: [phone]        Refills needed?  no  Letter for work/school needed?  no    Chief Complaint   Patient presents with   • Office Visit     Post op #1 left hip TFN, DOS 10/24/22        Incision appears well healed without signs or symptoms of infections.  Per Raúl Lewis NP v.o staples removed and steri strips applied.  Patient tolerated well.  Instructed in wound care.  Patient verbalized understanding.        Admission

## 2023-08-02 NOTE — PRE-OP CHECKLIST - PATIENT'S PERSONAL PROPERTY GIVEN TO
family member/on unit 2 bags sda, valuables w son/family member/on unit 3 bags sda, valuables w son/family member/on unit

## 2023-08-02 NOTE — BRIEF OPERATIVE NOTE - NSICDXBRIEFPROCEDURE_GEN_ALL_CORE_FT
PROCEDURES:  Cystoscopy, with bladder biopsy 02-Aug-2023 13:59:41  Nader Hauser  Evacuation of clots from bladder 02-Aug-2023 13:59:49  Nader Hauser

## 2023-08-02 NOTE — PRE-ANESTHESIA EVALUATION ADULT - NSRADCARDRESULTSFT_GEN_ALL_CORE
echo 7/7/23:  CONCLUSIONS:     1. Normal left and right ventricular cavity size and systolic function,   LV EF 66%.   2. Grade II left ventricular diastolic dysfunction with elevated filling   pressure.   3. Dilated left atrium.   4. Aortic sclerosis without significant stenosis.   5. Mild tricuspid regurgitation.   6. Pulmonary hypertension present, pulmonary artery systolic pressure is   53 mmHg.   7. No pericardial effusion.

## 2023-08-02 NOTE — PATIENT PROFILE ADULT - FALL HARM RISK - HARM RISK INTERVENTIONS
Assistance with ambulation/Assistance OOB with selected safe patient handling equipment/Communicate Risk of Fall with Harm to all staff/Discuss with provider need for PT consult/Monitor gait and stability/Reinforce activity limits and safety measures with patient and family/Tailored Fall Risk Interventions/Visual Cue: Yellow wristband and red socks/Bed in lowest position, wheels locked, appropriate side rails in place/Call bell, personal items and telephone in reach/Instruct patient to call for assistance before getting out of bed or chair/Non-slip footwear when patient is out of bed/[place] to call system/Physically safe environment - no spills, clutter or unnecessary equipment/Purposeful Proactive Rounding/Room/bathroom lighting operational, light cord in reach

## 2023-08-02 NOTE — PATIENT PROFILE ADULT - FUNCTIONAL ASSESSMENT - DAILY ACTIVITY 1.
Body Location Override (Optional - Billing Will Still Be Based On Selected Body Map Location If Applicable): left inferior lower back Biopsy Photograph Reviewed: Yes Size Of Lesion In Cm: 1.7 X Size Of Lesion In Cm (Optional): 0 Size Of Margin In Cm: 1 Excision Method: Elliptical Anesthesia Volume In Cc: 6 Did You Provide Opioid Counseling: No Repair Type: Intermediate Intermediate / Complex Repair - Final Wound Length In Cm: 9 Undermining Type: Entire Wound Debridement Text: The wound edges were debrided prior to proceeding with the closure to facilitate wound healing. Helical Rim Text: The closure involved the helical rim. Vermilion Border Text: The closure involved the vermilion border. Nostril Rim Text: The closure involved the nostril rim. Retention Suture Text: Retention sutures were placed to support the closure and prevent dehiscence. Suture Removal: 14 days Lab: [id number] [facility] [id number] Graft Donor Site Bandage (Optional-Leave Blank If You Don't Want In Note): Steri-strips and a pressure bandage were applied to the donor site. Epidermal Closure Graft Donor Site (Optional): simple interrupted Billing Type: [employer] Excision Depth: adipose tissue Scalpel Size: 10 blade Anesthesia Type: 1% lidocaine with epinephrine Hemostasis: Electrocautery Estimated Blood Loss (Cc): minimal Detail Level: Detailed Repair Anesthesia Method: local infiltration Undermining Location (Optional): in the deep fat Deep Sutures: 3-0 Monocryl Dermal Closure: buried vertical mattress Epidermal Sutures: 3-0 Nylon Wound Care: Petrolatum Dressing: dry sterile dressing Positioning (Leave Blank If You Do Not Want): The patient was placed in a comfortable position exposing the surgical site. Pre-Excision Curettage Text (Leave Blank If You Do Not Want): Prior to drawing the surgical margin the visible lesion was removed with electrodesiccation and curettage to clearly define the lesion size. Complex Repair Preamble Text (Leave Blank If You Do Not Want): Extensive wide undermining was performed. Intermediate Repair Preamble Text (Leave Blank If You Do Not Want): Undermining was performed with blunt dissection. Curvilinear Excision Additional Text (Leave Blank If You Do Not Want): The margin was drawn around the clinically apparent lesion. A curvilinear shape was then drawn on the skin incorporating the lesion and margins. Incisions were then made along these lines to the appropriate tissue plane and the lesion was extirpated. Fusiform Excision Additional Text (Leave Blank If You Do Not Want): The margin was drawn around the clinically apparent lesion. A fusiform shape was then drawn on the skin incorporating the lesion and margins. Incisions were then made along these lines to the appropriate tissue plane and the lesion was extirpated. Elliptical Excision Additional Text (Leave Blank If You Do Not Want): The margin was drawn around the clinically apparent lesion. An elliptical shape was then drawn on the skin incorporating the lesion and margins. Incisions were then made along these lines to the appropriate tissue plane and the lesion was extirpated. Saucerization Excision Additional Text (Leave Blank If You Do Not Want): The margin was drawn around the clinically apparent lesion. Incisions were then made along these lines, in a tangential fashion, to the appropriate tissue plane and the lesion was extirpated. Slit Excision Additional Text (Leave Blank If You Do Not Want): A linear line was drawn on the skin overlying the lesion. An incision was made slowly until the lesion was visualized. Once visualized, the lesion was removed with blunt dissection. Excisional Biopsy Additional Text (Leave Blank If You Do Not Want): The margin was drawn around the clinically apparent lesion. An elliptical shape was then drawn on the skin incorporating the lesion and margins.  Incisions were then made along these lines to the appropriate tissue plane and the lesion was extirpated. Perilesional Excision Additional Text (Leave Blank If You Do Not Want): The margin was drawn around the clinically apparent lesion. Incisions were then made along these lines to the appropriate tissue plane and the lesion was extirpated. Repair Performed By Another Provider Text (Leave Blank If You Do Not Want): After the tissue was excised the defect was repaired by another provider. No Repair - Repaired With Adjacent Surgical Defect Text (Leave Blank If You Do Not Want): After the excision the defect was repaired concurrently with another surgical defect which was in close approximation. Advancement Flap (Single) Text: The defect edges were debeveled with a #15 scalpel blade. Given the location of the defect and the proximity to free margins a single advancement flap was deemed most appropriate. Using a sterile surgical marker, an appropriate advancement flap was drawn incorporating the defect and placing the expected incisions within the relaxed skin tension lines where possible. The area thus outlined was incised deep to adipose tissue with a #15 scalpel blade. The skin margins were undermined to an appropriate distance in all directions utilizing iris scissors. Advancement Flap (Double) Text: The defect edges were debeveled with a #15 scalpel blade. Given the location of the defect and the proximity to free margins a double advancement flap was deemed most appropriate. Using a sterile surgical marker, the appropriate advancement flaps were drawn incorporating the defect and placing the expected incisions within the relaxed skin tension lines where possible. The area thus outlined was incised deep to adipose tissue with a #15 scalpel blade. The skin margins were undermined to an appropriate distance in all directions utilizing iris scissors. Burow's Advancement Flap Text: The defect edges were debeveled with a #15 scalpel blade. Given the location of the defect and the proximity to free margins a Burow's advancement flap was deemed most appropriate. Using a sterile surgical marker, the appropriate advancement flap was drawn incorporating the defect and placing the expected incisions within the relaxed skin tension lines where possible. The area thus outlined was incised deep to adipose tissue with a #15 scalpel blade. The skin margins were undermined to an appropriate distance in all directions utilizing iris scissors. Chonodrocutaneous Helical Advancement Flap Text: The defect edges were debeveled with a #15 scalpel blade. Given the location of the defect and the proximity to free margins a chondrocutaneous helical advancement flap was deemed most appropriate. Using a sterile surgical marker, the appropriate advancement flap was drawn incorporating the defect and placing the expected incisions within the relaxed skin tension lines where possible. The area thus outlined was incised deep to adipose tissue with a #15 scalpel blade. The skin margins were undermined to an appropriate distance in all directions utilizing iris scissors. Crescentic Advancement Flap Text: The defect edges were debeveled with a #15 scalpel blade. Given the location of the defect and the proximity to free margins a crescentic advancement flap was deemed most appropriate. Using a sterile surgical marker, the appropriate advancement flap was drawn incorporating the defect and placing the expected incisions within the relaxed skin tension lines where possible. The area thus outlined was incised deep to adipose tissue with a #15 scalpel blade. The skin margins were undermined to an appropriate distance in all directions utilizing iris scissors. A-T Advancement Flap Text: The defect edges were debeveled with a #15 scalpel blade. Given the location of the defect, shape of the defect and the proximity to free margins an A-T advancement flap was deemed most appropriate. Using a sterile surgical marker, an appropriate advancement flap was drawn incorporating the defect and placing the expected incisions within the relaxed skin tension lines where possible. The area thus outlined was incised deep to adipose tissue with a #15 scalpel blade. The skin margins were undermined to an appropriate distance in all directions utilizing iris scissors. O-T Advancement Flap Text: The defect edges were debeveled with a #15 scalpel blade. Given the location of the defect, shape of the defect and the proximity to free margins an O-T advancement flap was deemed most appropriate. Using a sterile surgical marker, an appropriate advancement flap was drawn incorporating the defect and placing the expected incisions within the relaxed skin tension lines where possible. The area thus outlined was incised deep to adipose tissue with a #15 scalpel blade. The skin margins were undermined to an appropriate distance in all directions utilizing iris scissors. O-L Flap Text: The defect edges were debeveled with a #15 scalpel blade. Given the location of the defect, shape of the defect and the proximity to free margins an O-L flap was deemed most appropriate. Using a sterile surgical marker, an appropriate advancement flap was drawn incorporating the defect and placing the expected incisions within the relaxed skin tension lines where possible. The area thus outlined was incised deep to adipose tissue with a #15 scalpel blade. The skin margins were undermined to an appropriate distance in all directions utilizing iris scissors. O-Z Flap Text: The defect edges were debeveled with a #15 scalpel blade. Given the location of the defect, shape of the defect and the proximity to free margins an O-Z flap was deemed most appropriate. Using a sterile surgical marker, an appropriate transposition flap was drawn incorporating the defect and placing the expected incisions within the relaxed skin tension lines where possible. The area thus outlined was incised deep to adipose tissue with a #15 scalpel blade. The skin margins were undermined to an appropriate distance in all directions utilizing iris scissors. Double O-Z Flap Text: The defect edges were debeveled with a #15 scalpel blade. Given the location of the defect, shape of the defect and the proximity to free margins a Double O-Z flap was deemed most appropriate. Using a sterile surgical marker, an appropriate transposition flap was drawn incorporating the defect and placing the expected incisions within the relaxed skin tension lines where possible. The area thus outlined was incised deep to adipose tissue with a #15 scalpel blade. The skin margins were undermined to an appropriate distance in all directions utilizing iris scissors. V-Y Flap Text: The defect edges were debeveled with a #15 scalpel blade. Given the location of the defect, shape of the defect and the proximity to free margins a V-Y flap was deemed most appropriate. Using a sterile surgical marker, an appropriate advancement flap was drawn incorporating the defect and placing the expected incisions within the relaxed skin tension lines where possible. The area thus outlined was incised deep to adipose tissue with a #15 scalpel blade. The skin margins were undermined to an appropriate distance in all directions utilizing iris scissors. Advancement-Rotation Flap Text: The defect edges were debeveled with a #15 scalpel blade. Given the location of the defect, shape of the defect and the proximity to free margins an advancement-rotation flap was deemed most appropriate. Using a sterile surgical marker, an appropriate flap was drawn incorporating the defect and placing the expected incisions within the relaxed skin tension lines where possible. The area thus outlined was incised deep to adipose tissue with a #15 scalpel blade. The skin margins were undermined to an appropriate distance in all directions utilizing iris scissors. Mercedes Flap Text: The defect edges were debeveled with a #15 scalpel blade. Given the location of the defect, shape of the defect and the proximity to free margins a Mercedes flap was deemed most appropriate. Using a sterile surgical marker, an appropriate advancement flap was drawn incorporating the defect and placing the expected incisions within the relaxed skin tension lines where possible. The area thus outlined was incised deep to adipose tissue with a #15 scalpel blade. The skin margins were undermined to an appropriate distance in all directions utilizing iris scissors. Modified Advancement Flap Text: The defect edges were debeveled with a #15 scalpel blade. Given the location of the defect, shape of the defect and the proximity to free margins a modified advancement flap was deemed most appropriate. Using a sterile surgical marker, an appropriate advancement flap was drawn incorporating the defect and placing the expected incisions within the relaxed skin tension lines where possible. The area thus outlined was incised deep to adipose tissue with a #15 scalpel blade. The skin margins were undermined to an appropriate distance in all directions utilizing iris scissors. Mucosal Advancement Flap Text: Given the location of the defect, shape of the defect and the proximity to free margins a mucosal advancement flap was deemed most appropriate. Incisions were made with a 15 blade scalpel in the appropriate fashion along the cutaneous vermilion border and the mucosal lip. The remaining actinically damaged mucosal tissue was excised. The mucosal advancement flap was then elevated to the gingival sulcus with care taken to preserve the neurovascular structures and advanced into the primary defect. Care was taken to ensure that precise realignment of the vermilion border was achieved. Hatchet Flap Text: The defect edges were debeveled with a #15 scalpel blade. Given the location of the defect, shape of the defect and the proximity to free margins a hatchet flap was deemed most appropriate. Using a sterile surgical marker, an appropriate hatchet flap was drawn incorporating the defect and placing the expected incisions within the relaxed skin tension lines where possible. The area thus outlined was incised deep to adipose tissue with a #15 scalpel blade. The skin margins were undermined to an appropriate distance in all directions utilizing iris scissors. Rotation Flap Text: The defect edges were debeveled with a #15 scalpel blade. Given the location of the defect, shape of the defect and the proximity to free margins a rotation flap was deemed most appropriate. Using a sterile surgical marker, an appropriate rotation flap was drawn incorporating the defect and placing the expected incisions within the relaxed skin tension lines where possible. The area thus outlined was incised deep to adipose tissue with a #15 scalpel blade. The skin margins were undermined to an appropriate distance in all directions utilizing iris scissors. Spiral Flap Text: The defect edges were debeveled with a #15 scalpel blade. Given the location of the defect, shape of the defect and the proximity to free margins a spiral flap was deemed most appropriate. Using a sterile surgical marker, an appropriate rotation flap was drawn incorporating the defect and placing the expected incisions within the relaxed skin tension lines where possible. The area thus outlined was incised deep to adipose tissue with a #15 scalpel blade. The skin margins were undermined to an appropriate distance in all directions utilizing iris scissors. Star Wedge Flap Text: The defect edges were debeveled with a #15 scalpel blade. Given the location of the defect, shape of the defect and the proximity to free margins a star wedge flap was deemed most appropriate. Using a sterile surgical marker, an appropriate rotation flap was drawn incorporating the defect and placing the expected incisions within the relaxed skin tension lines where possible. The area thus outlined was incised deep to adipose tissue with a #15 scalpel blade. The skin margins were undermined to an appropriate distance in all directions utilizing iris scissors. Transposition Flap Text: The defect edges were debeveled with a #15 scalpel blade. Given the location of the defect and the proximity to free margins a transposition flap was deemed most appropriate. Using a sterile surgical marker, an appropriate transposition flap was drawn incorporating the defect. The area thus outlined was incised deep to adipose tissue with a #15 scalpel blade. The skin margins were undermined to an appropriate distance in all directions utilizing iris scissors. Muscle Hinge Flap Text: The defect edges were debeveled with a #15 scalpel blade. Given the size, depth and location of the defect and the proximity to free margins a muscle hinge flap was deemed most appropriate. Using a sterile surgical marker, an appropriate hinge flap was drawn incorporating the defect. The area thus outlined was incised with a #15 scalpel blade. The skin margins were undermined to an appropriate distance in all directions utilizing iris scissors. Melolabial Transposition Flap Text: The defect edges were debeveled with a #15 scalpel blade. Given the location of the defect and the proximity to free margins a melolabial flap was deemed most appropriate. Using a sterile surgical marker, an appropriate melolabial transposition flap was drawn incorporating the defect. The area thus outlined was incised deep to adipose tissue with a #15 scalpel blade. The skin margins were undermined to an appropriate distance in all directions utilizing iris scissors. Rhombic Flap Text: The defect edges were debeveled with a #15 scalpel blade. Given the location of the defect and the proximity to free margins a rhombic flap was deemed most appropriate. Using a sterile surgical marker, an appropriate rhombic flap was drawn incorporating the defect. The area thus outlined was incised deep to adipose tissue with a #15 scalpel blade. The skin margins were undermined to an appropriate distance in all directions utilizing iris scissors. Rhomboid Transposition Flap Text: The defect edges were debeveled with a #15 scalpel blade. Given the location of the defect and the proximity to free margins a rhomboid transposition flap was deemed most appropriate. Using a sterile surgical marker, an appropriate rhomboid flap was drawn incorporating the defect. The area thus outlined was incised deep to adipose tissue with a #15 scalpel blade. The skin margins were undermined to an appropriate distance in all directions utilizing iris scissors. Bi-Rhombic Flap Text: The defect edges were debeveled with a #15 scalpel blade. Given the location of the defect and the proximity to free margins a bi-rhombic flap was deemed most appropriate. Using a sterile surgical marker, an appropriate rhombic flap was drawn incorporating the defect. The area thus outlined was incised deep to adipose tissue with a #15 scalpel blade. The skin margins were undermined to an appropriate distance in all directions utilizing iris scissors. Helical Rim Advancement Flap Text: The defect edges were debeveled with a #15 blade scalpel. Given the location of the defect and the proximity to free margins (helical rim) a double helical rim advancement flap was deemed most appropriate. Using a sterile surgical marker, the appropriate advancement flaps were drawn incorporating the defect and placing the expected incisions between the helical rim and antihelix where possible. The area thus outlined was incised through and through with a #15 scalpel blade. With a skin hook and iris scissors, the flaps were gently and sharply undermined and freed up. Bilateral Helical Rim Advancement Flap Text: The defect edges were debeveled with a #15 blade scalpel. Given the location of the defect and the proximity to free margins (helical rim) a bilateral helical rim advancement flap was deemed most appropriate. Using a sterile surgical marker, the appropriate advancement flaps were drawn incorporating the defect and placing the expected incisions between the helical rim and antihelix where possible. The area thus outlined was incised through and through with a #15 scalpel blade. With a skin hook and iris scissors, the flaps were gently and sharply undermined and freed up. Ear Star Wedge Flap Text: The defect edges were debeveled with a #15 blade scalpel. Given the location of the defect and the proximity to free margins (helical rim) an ear star wedge flap was deemed most appropriate. Using a sterile surgical marker, the appropriate flap was drawn incorporating the defect and placing the expected incisions between the helical rim and antihelix where possible. The area thus outlined was incised through and through with a #15 scalpel blade. Banner Transposition Flap Text: The defect edges were debeveled with a #15 scalpel blade. Given the location of the defect and the proximity to free margins a Banner transposition flap was deemed most appropriate. Using a sterile surgical marker, an appropriate flap drawn around the defect. The area thus outlined was incised deep to adipose tissue with a #15 scalpel blade. The skin margins were undermined to an appropriate distance in all directions utilizing iris scissors. Bilobed Flap Text: The defect edges were debeveled with a #15 scalpel blade. Given the location of the defect and the proximity to free margins a bilobe flap was deemed most appropriate. Using a sterile surgical marker, an appropriate bilobe flap drawn around the defect. The area thus outlined was incised deep to adipose tissue with a #15 scalpel blade. The skin margins were undermined to an appropriate distance in all directions utilizing iris scissors. 3 = A little assistance Bilobed Transposition Flap Text: The defect edges were debeveled with a #15 scalpel blade. Given the location of the defect and the proximity to free margins a bilobed transposition flap was deemed most appropriate. Using a sterile surgical marker, an appropriate bilobe flap drawn around the defect. The area thus outlined was incised deep to adipose tissue with a #15 scalpel blade. The skin margins were undermined to an appropriate distance in all directions utilizing iris scissors. Trilobed Flap Text: The defect edges were debeveled with a #15 scalpel blade. Given the location of the defect and the proximity to free margins a trilobed flap was deemed most appropriate. Using a sterile surgical marker, an appropriate trilobed flap drawn around the defect. The area thus outlined was incised deep to adipose tissue with a #15 scalpel blade. The skin margins were undermined to an appropriate distance in all directions utilizing iris scissors. Dorsal Nasal Flap Text: The defect edges were debeveled with a #15 scalpel blade. Given the location of the defect and the proximity to free margins a dorsal nasal flap was deemed most appropriate. Using a sterile surgical marker, an appropriate dorsal nasal flap was drawn around the defect. The area thus outlined was incised deep to adipose tissue with a #15 scalpel blade. The skin margins were undermined to an appropriate distance in all directions utilizing iris scissors. Island Pedicle Flap Text: The defect edges were debeveled with a #15 scalpel blade. Given the location of the defect, shape of the defect and the proximity to free margins an island pedicle advancement flap was deemed most appropriate. Using a sterile surgical marker, an appropriate advancement flap was drawn incorporating the defect, outlining the appropriate donor tissue and placing the expected incisions within the relaxed skin tension lines where possible. The area thus outlined was incised deep to adipose tissue with a #15 scalpel blade. The skin margins were undermined to an appropriate distance in all directions around the primary defect and laterally outward around the island pedicle utilizing iris scissors. There was minimal undermining beneath the pedicle flap. Island Pedicle Flap With Canthal Suspension Text: The defect edges were debeveled with a #15 scalpel blade. Given the location of the defect, shape of the defect and the proximity to free margins an island pedicle advancement flap was deemed most appropriate. Using a sterile surgical marker, an appropriate advancement flap was drawn incorporating the defect, outlining the appropriate donor tissue and placing the expected incisions within the relaxed skin tension lines where possible. The area thus outlined was incised deep to adipose tissue with a #15 scalpel blade. The skin margins were undermined to an appropriate distance in all directions around the primary defect and laterally outward around the island pedicle utilizing iris scissors. There was minimal undermining beneath the pedicle flap. A suspension suture was placed in the canthal tendon to prevent tension and prevent ectropion. Alar Island Pedicle Flap Text: The defect edges were debeveled with a #15 scalpel blade. Given the location of the defect, shape of the defect and the proximity to the alar rim an island pedicle advancement flap was deemed most appropriate. Using a sterile surgical marker, an appropriate advancement flap was drawn incorporating the defect, outlining the appropriate donor tissue and placing the expected incisions within the nasal ala running parallel to the alar rim. The area thus outlined was incised with a #15 scalpel blade. The skin margins were undermined minimally to an appropriate distance in all directions around the primary defect and laterally outward around the island pedicle utilizing iris scissors. There was minimal undermining beneath the pedicle flap. Double Island Pedicle Flap Text: The defect edges were debeveled with a #15 scalpel blade. Given the location of the defect, shape of the defect and the proximity to free margins a double island pedicle advancement flap was deemed most appropriate. Using a sterile surgical marker, an appropriate advancement flap was drawn incorporating the defect, outlining the appropriate donor tissue and placing the expected incisions within the relaxed skin tension lines where possible. The area thus outlined was incised deep to adipose tissue with a #15 scalpel blade. The skin margins were undermined to an appropriate distance in all directions around the primary defect and laterally outward around the island pedicle utilizing iris scissors. There was minimal undermining beneath the pedicle flap. Island Pedicle Flap-Requiring Vessel Identification Text: The defect edges were debeveled with a #15 scalpel blade. Given the location of the defect, shape of the defect and the proximity to free margins an island pedicle advancement flap was deemed most appropriate. Using a sterile surgical marker, an appropriate advancement flap was drawn, based on the axial vessel mentioned above, incorporating the defect, outlining the appropriate donor tissue and placing the expected incisions within the relaxed skin tension lines where possible. The area thus outlined was incised deep to adipose tissue with a #15 scalpel blade. The skin margins were undermined to an appropriate distance in all directions around the primary defect and laterally outward around the island pedicle utilizing iris scissors. There was minimal undermining beneath the pedicle flap. Keystone Flap Text: The defect edges were debeveled with a #15 scalpel blade. Given the location of the defect, shape of the defect a keystone flap was deemed most appropriate. Using a sterile surgical marker, an appropriate keystone flap was drawn incorporating the defect, outlining the appropriate donor tissue and placing the expected incisions within the relaxed skin tension lines where possible. The area thus outlined was incised deep to adipose tissue with a #15 scalpel blade. The skin margins were undermined to an appropriate distance in all directions around the primary defect and laterally outward around the flap utilizing iris scissors. O-T Plasty Text: The defect edges were debeveled with a #15 scalpel blade. Given the location of the defect, shape of the defect and the proximity to free margins an O-T plasty was deemed most appropriate. Using a sterile surgical marker, an appropriate O-T plasty was drawn incorporating the defect and placing the expected incisions within the relaxed skin tension lines where possible. The area thus outlined was incised deep to adipose tissue with a #15 scalpel blade. The skin margins were undermined to an appropriate distance in all directions utilizing iris scissors. O-Z Plasty Text: The defect edges were debeveled with a #15 scalpel blade. Given the location of the defect, shape of the defect and the proximity to free margins an O-Z plasty (double transposition flap) was deemed most appropriate. Using a sterile surgical marker, the appropriate transposition flaps were drawn incorporating the defect and placing the expected incisions within the relaxed skin tension lines where possible. The area thus outlined was incised deep to adipose tissue with a #15 scalpel blade. The skin margins were undermined to an appropriate distance in all directions utilizing iris scissors. Hemostasis was achieved with electrocautery. The flaps were then transposed into place, one clockwise and the other counterclockwise, and anchored with interrupted buried subcutaneous sutures. Double O-Z Plasty Text: The defect edges were debeveled with a #15 scalpel blade. Given the location of the defect, shape of the defect and the proximity to free margins a Double O-Z plasty (double transposition flap) was deemed most appropriate. Using a sterile surgical marker, the appropriate transposition flaps were drawn incorporating the defect and placing the expected incisions within the relaxed skin tension lines where possible. The area thus outlined was incised deep to adipose tissue with a #15 scalpel blade. The skin margins were undermined to an appropriate distance in all directions utilizing iris scissors. Hemostasis was achieved with electrocautery. The flaps were then transposed into place, one clockwise and the other counterclockwise, and anchored with interrupted buried subcutaneous sutures. S Plasty Text: Given the location and shape of the defect, and the orientation of relaxed skin tension lines, an S-plasty was deemed most appropriate for repair. Using a sterile surgical marker, the appropriate outline of the S-plasty was drawn, incorporating the defect and placing the expected incisions within the relaxed skin tension lines where possible. The area thus outlined was incised deep to adipose tissue with a #15 scalpel blade. The skin margins were undermined to an appropriate distance in all directions utilizing iris scissors. The skin flaps were advanced over the defect. The opposing margins were then approximated with interrupted buried subcutaneous sutures. V-Y Plasty Text: The defect edges were debeveled with a #15 scalpel blade. Given the location of the defect, shape of the defect and the proximity to free margins an V-Y advancement flap was deemed most appropriate. Using a sterile surgical marker, an appropriate advancement flap was drawn incorporating the defect and placing the expected incisions within the relaxed skin tension lines where possible. The area thus outlined was incised deep to adipose tissue with a #15 scalpel blade. The skin margins were undermined to an appropriate distance in all directions utilizing iris scissors. H Plasty Text: Given the location of the defect, shape of the defect and the proximity to free margins a H-plasty was deemed most appropriate for repair. Using a sterile surgical marker, the appropriate advancement arms of the H-plasty were drawn incorporating the defect and placing the expected incisions within the relaxed skin tension lines where possible. The area thus outlined was incised deep to adipose tissue with a #15 scalpel blade. The skin margins were undermined to an appropriate distance in all directions utilizing iris scissors. The opposing advancement arms were then advanced into place in opposite direction and anchored with interrupted buried subcutaneous sutures. W Plasty Text: The lesion was extirpated to the level of the fat with a #15 scalpel blade. Given the location of the defect, shape of the defect and the proximity to free margins a W-plasty was deemed most appropriate for repair. Using a sterile surgical marker, the appropriate transposition arms of the W-plasty were drawn incorporating the defect and placing the expected incisions within the relaxed skin tension lines where possible. The area thus outlined was incised deep to adipose tissue with a #15 scalpel blade. The skin margins were undermined to an appropriate distance in all directions utilizing iris scissors. The opposing transposition arms were then transposed into place in opposite direction and anchored with interrupted buried subcutaneous sutures. Z Plasty Text: The lesion was extirpated to the level of the fat with a #15 scalpel blade. Given the location of the defect, shape of the defect and the proximity to free margins a Z-plasty was deemed most appropriate for repair. Using a sterile surgical marker, the appropriate transposition arms of the Z-plasty were drawn incorporating the defect and placing the expected incisions within the relaxed skin tension lines where possible. The area thus outlined was incised deep to adipose tissue with a #15 scalpel blade. The skin margins were undermined to an appropriate distance in all directions utilizing iris scissors. The opposing transposition arms were then transposed into place in opposite direction and anchored with interrupted buried subcutaneous sutures. Cheek Interpolation Flap Text: A decision was made to reconstruct the defect utilizing an interpolation axial flap and a staged reconstruction. A telfa template was made of the defect. This telfa template was then used to outline the Cheek Interpolation flap. The donor area for the pedicle flap was then injected with anesthesia. The flap was excised through the skin and subcutaneous tissue down to the layer of the underlying musculature. The interpolation flap was carefully excised within this deep plane to maintain its blood supply. The edges of the donor site were undermined. The donor site was closed in a primary fashion. The pedicle was then rotated into position and sutured. Once the tube was sutured into place, adequate blood supply was confirmed with blanching and refill. The pedicle was then wrapped with xeroform gauze and dressed appropriately with a telfa and gauze bandage to ensure continued blood supply and protect the attached pedicle. Cheek-To-Nose Interpolation Flap Text: A decision was made to reconstruct the defect utilizing an interpolation axial flap and a staged reconstruction. A telfa template was made of the defect. This telfa template was then used to outline the Cheek-To-Nose Interpolation flap. The donor area for the pedicle flap was then injected with anesthesia. The flap was excised through the skin and subcutaneous tissue down to the layer of the underlying musculature. The interpolation flap was carefully excised within this deep plane to maintain its blood supply. The edges of the donor site were undermined. The donor site was closed in a primary fashion. The pedicle was then rotated into position and sutured. Once the tube was sutured into place, adequate blood supply was confirmed with blanching and refill. The pedicle was then wrapped with xeroform gauze and dressed appropriately with a telfa and gauze bandage to ensure continued blood supply and protect the attached pedicle. Interpolation Flap Text: A decision was made to reconstruct the defect utilizing an interpolation axial flap and a staged reconstruction. A telfa template was made of the defect. This telfa template was then used to outline the interpolation flap. The donor area for the pedicle flap was then injected with anesthesia. The flap was excised through the skin and subcutaneous tissue down to the layer of the underlying musculature. The interpolation flap was carefully excised within this deep plane to maintain its blood supply. The edges of the donor site were undermined. The donor site was closed in a primary fashion. The pedicle was then rotated into position and sutured. Once the tube was sutured into place, adequate blood supply was confirmed with blanching and refill. The pedicle was then wrapped with xeroform gauze and dressed appropriately with a telfa and gauze bandage to ensure continued blood supply and protect the attached pedicle. Melolabial Interpolation Flap Text: A decision was made to reconstruct the defect utilizing an interpolation axial flap and a staged reconstruction. A telfa template was made of the defect. This telfa template was then used to outline the melolabial interpolation flap. The donor area for the pedicle flap was then injected with anesthesia. The flap was excised through the skin and subcutaneous tissue down to the layer of the underlying musculature. The pedicle flap was carefully excised within this deep plane to maintain its blood supply. The edges of the donor site were undermined. The donor site was closed in a primary fashion. The pedicle was then rotated into position and sutured. Once the tube was sutured into place, adequate blood supply was confirmed with blanching and refill. The pedicle was then wrapped with xeroform gauze and dressed appropriately with a telfa and gauze bandage to ensure continued blood supply and protect the attached pedicle. Mastoid Interpolation Flap Text: A decision was made to reconstruct the defect utilizing an interpolation axial flap and a staged reconstruction. A telfa template was made of the defect. This telfa template was then used to outline the mastoid interpolation flap. The donor area for the pedicle flap was then injected with anesthesia. The flap was excised through the skin and subcutaneous tissue down to the layer of the underlying musculature. The pedicle flap was carefully excised within this deep plane to maintain its blood supply. The edges of the donor site were undermined. The donor site was closed in a primary fashion. The pedicle was then rotated into position and sutured. Once the tube was sutured into place, adequate blood supply was confirmed with blanching and refill. The pedicle was then wrapped with xeroform gauze and dressed appropriately with a telfa and gauze bandage to ensure continued blood supply and protect the attached pedicle. Posterior Auricular Interpolation Flap Text: A decision was made to reconstruct the defect utilizing an interpolation axial flap and a staged reconstruction. A telfa template was made of the defect. This telfa template was then used to outline the posterior auricular interpolation flap. The donor area for the pedicle flap was then injected with anesthesia. The flap was excised through the skin and subcutaneous tissue down to the layer of the underlying musculature. The pedicle flap was carefully excised within this deep plane to maintain its blood supply. The edges of the donor site were undermined. The donor site was closed in a primary fashion. The pedicle was then rotated into position and sutured. Once the tube was sutured into place, adequate blood supply was confirmed with blanching and refill. The pedicle was then wrapped with xeroform gauze and dressed appropriately with a telfa and gauze bandage to ensure continued blood supply and protect the attached pedicle. Paramedian Forehead Flap Text: A decision was made to reconstruct the defect utilizing an interpolation axial flap and a staged reconstruction. A telfa template was made of the defect. This telfa template was then used to outline the paramedian forehead pedicle flap. The donor area for the pedicle flap was then injected with anesthesia. The flap was excised through the skin and subcutaneous tissue down to the layer of the underlying musculature. The pedicle flap was carefully excised within this deep plane to maintain its blood supply. The edges of the donor site were undermined. The donor site was closed in a primary fashion. The pedicle was then rotated into position and sutured. Once the tube was sutured into place, adequate blood supply was confirmed with blanching and refill. The pedicle was then wrapped with xeroform gauze and dressed appropriately with a telfa and gauze bandage to ensure continued blood supply and protect the attached pedicle. Lip Wedge Excision Repair Text: Given the location of the defect and the proximity to free margins a full thickness wedge repair was deemed most appropriate. Using a sterile surgical marker, the appropriate repair was drawn incorporating the defect and placing the expected incisions perpendicular to the vermilion border. The vermilion border was also meticulously outlined to ensure appropriate reapproximation during the repair. The area thus outlined was incised through and through with a #15 scalpel blade. The muscularis and dermis were reaproximated with deep sutures following hemostasis. Care was taken to realign the vermilion border before proceeding with the superficial closure. Once the vermilion was realigned the superfical and mucosal closure was finished. Ftsg Text: The defect edges were debeveled with a #15 scalpel blade. Given the location of the defect, shape of the defect and the proximity to free margins a full thickness skin graft was deemed most appropriate. Using a sterile surgical marker, the primary defect shape was transferred to the donor site. The area thus outlined was incised deep to adipose tissue with a #15 scalpel blade. The harvested graft was then trimmed of adipose tissue until only dermis and epidermis was left. The skin margins of the secondary defect were undermined to an appropriate distance in all directions utilizing iris scissors. The secondary defect was closed with interrupted buried subcutaneous sutures. The skin edges were then re-apposed with running  sutures. The skin graft was then placed in the primary defect and oriented appropriately. Split-Thickness Skin Graft Text: The defect edges were debeveled with a #15 scalpel blade. Given the location of the defect, shape of the defect and the proximity to free margins a split thickness skin graft was deemed most appropriate. Using a sterile surgical marker, the primary defect shape was transferred to the donor site. The split thickness graft was then harvested. The skin graft was then placed in the primary defect and oriented appropriately. Cartilage Graft Text: The defect edges were debeveled with a #15 scalpel blade. Given the location of the defect, shape of the defect, the fact the defect involved a full thickness cartilage defect a cartilage graft was deemed most appropriate. An appropriate donor site was identified, cleansed, and anesthetized. The cartilage graft was then harvested and transferred to the recipient site, oriented appropriately and then sutured into place. The secondary defect was then repaired using a primary closure. Composite Graft Text: The defect edges were debeveled with a #15 scalpel blade. Given the location of the defect, shape of the defect, the proximity to free margins and the fact the defect was full thickness a composite graft was deemed most appropriate. The defect was outline and then transferred to the donor site. A full thickness graft was then excised from the donor site. The graft was then placed in the primary defect, oriented appropriately and then sutured into place. The secondary defect was then repaired using a primary closure. Epidermal Autograft Text: The defect edges were debeveled with a #15 scalpel blade. Given the location of the defect, shape of the defect and the proximity to free margins an epidermal autograft was deemed most appropriate. Using a sterile surgical marker, the primary defect shape was transferred to the donor site. The epidermal graft was then harvested. The skin graft was then placed in the primary defect and oriented appropriately. Dermal Autograft Text: The defect edges were debeveled with a #15 scalpel blade. Given the location of the defect, shape of the defect and the proximity to free margins a dermal autograft was deemed most appropriate. Using a sterile surgical marker, the primary defect shape was transferred to the donor site. The area thus outlined was incised deep to adipose tissue with a #15 scalpel blade. The harvested graft was then trimmed of adipose and epidermal tissue until only dermis was left. The skin graft was then placed in the primary defect and oriented appropriately. Skin Substitute Text: The defect edges were debeveled with a #15 scalpel blade. Given the location of the defect, shape of the defect and the proximity to free margins a skin substitute graft was deemed most appropriate. The graft material was trimmed to fit the size of the defect. The graft was then placed in the primary defect and oriented appropriately. Tissue Cultured Epidermal Autograft Text: The defect edges were debeveled with a #15 scalpel blade. Given the location of the defect, shape of the defect and the proximity to free margins a tissue cultured epidermal autograft was deemed most appropriate. The graft was then trimmed to fit the size of the defect. The graft was then placed in the primary defect and oriented appropriately. Xenograft Text: The defect edges were debeveled with a #15 scalpel blade. Given the location of the defect, shape of the defect and the proximity to free margins a xenograft was deemed most appropriate. The graft was then trimmed to fit the size of the defect. The graft was then placed in the primary defect and oriented appropriately. Purse String (Intermediate) Text: Given the location of the defect and the characteristics of the surrounding skin a purse string intermediate closure was deemed most appropriate. Undermining was performed circumfirentially around the surgical defect. A purse string suture was then placed and tightened. Purse String (Simple) Text: Given the location of the defect and the characteristics of the surrounding skin a purse string simple closure was deemed most appropriate. Undermining was performed circumferentially around the surgical defect. A purse string suture was then placed and tightened. Partial Purse String (Intermediate) Text: Given the location of the defect and the characteristics of the surrounding skin an intermediate purse string closure was deemed most appropriate. Undermining was performed circumferentially around the surgical defect. A purse string suture was then placed and tightened. Wound tension of the circular defect prevented complete closure of the wound. Partial Purse String (Simple) Text: Given the location of the defect and the characteristics of the surrounding skin a simple purse string closure was deemed most appropriate. Undermining was performed circumferentially around the surgical defect. A purse string suture was then placed and tightened. Wound tension of the circular defect prevented complete closure of the wound. Complex Repair And Single Advancement Flap Text: The defect edges were debeveled with a #15 scalpel blade. The primary defect was closed partially with a complex linear closure. Given the location of the remaining defect, shape of the defect and the proximity to free margins a single advancement flap was deemed most appropriate for complete closure of the defect. Using a sterile surgical marker, an appropriate advancement flap was drawn incorporating the defect and placing the expected incisions within the relaxed skin tension lines where possible. The area thus outlined was incised deep to adipose tissue with a #15 scalpel blade. The skin margins were undermined to an appropriate distance in all directions utilizing iris scissors. Complex Repair And Double Advancement Flap Text: The defect edges were debeveled with a #15 scalpel blade. The primary defect was closed partially with a complex linear closure. Given the location of the remaining defect, shape of the defect and the proximity to free margins a double advancement flap was deemed most appropriate for complete closure of the defect. Using a sterile surgical marker, an appropriate advancement flap was drawn incorporating the defect and placing the expected incisions within the relaxed skin tension lines where possible. The area thus outlined was incised deep to adipose tissue with a #15 scalpel blade. The skin margins were undermined to an appropriate distance in all directions utilizing iris scissors. Complex Repair And Modified Advancement Flap Text: The defect edges were debeveled with a #15 scalpel blade. The primary defect was closed partially with a complex linear closure. Given the location of the remaining defect, shape of the defect and the proximity to free margins a modified advancement flap was deemed most appropriate for complete closure of the defect. Using a sterile surgical marker, an appropriate advancement flap was drawn incorporating the defect and placing the expected incisions within the relaxed skin tension lines where possible. The area thus outlined was incised deep to adipose tissue with a #15 scalpel blade. The skin margins were undermined to an appropriate distance in all directions utilizing iris scissors. Complex Repair And A-T Advancement Flap Text: The defect edges were debeveled with a #15 scalpel blade. The primary defect was closed partially with a complex linear closure. Given the location of the remaining defect, shape of the defect and the proximity to free margins an A-T advancement flap was deemed most appropriate for complete closure of the defect. Using a sterile surgical marker, an appropriate advancement flap was drawn incorporating the defect and placing the expected incisions within the relaxed skin tension lines where possible. The area thus outlined was incised deep to adipose tissue with a #15 scalpel blade. The skin margins were undermined to an appropriate distance in all directions utilizing iris scissors. Complex Repair And O-T Advancement Flap Text: The defect edges were debeveled with a #15 scalpel blade. The primary defect was closed partially with a complex linear closure. Given the location of the remaining defect, shape of the defect and the proximity to free margins an O-T advancement flap was deemed most appropriate for complete closure of the defect. Using a sterile surgical marker, an appropriate advancement flap was drawn incorporating the defect and placing the expected incisions within the relaxed skin tension lines where possible. The area thus outlined was incised deep to adipose tissue with a #15 scalpel blade. The skin margins were undermined to an appropriate distance in all directions utilizing iris scissors. Complex Repair And O-L Flap Text: The defect edges were debeveled with a #15 scalpel blade. The primary defect was closed partially with a complex linear closure. Given the location of the remaining defect, shape of the defect and the proximity to free margins an O-L flap was deemed most appropriate for complete closure of the defect. Using a sterile surgical marker, an appropriate flap was drawn incorporating the defect and placing the expected incisions within the relaxed skin tension lines where possible. The area thus outlined was incised deep to adipose tissue with a #15 scalpel blade. The skin margins were undermined to an appropriate distance in all directions utilizing iris scissors. Complex Repair And Bilobe Flap Text: The defect edges were debeveled with a #15 scalpel blade. The primary defect was closed partially with a complex linear closure. Given the location of the remaining defect, shape of the defect and the proximity to free margins a bilobe flap was deemed most appropriate for complete closure of the defect. Using a sterile surgical marker, an appropriate advancement flap was drawn incorporating the defect and placing the expected incisions within the relaxed skin tension lines where possible. The area thus outlined was incised deep to adipose tissue with a #15 scalpel blade. The skin margins were undermined to an appropriate distance in all directions utilizing iris scissors. Complex Repair And Melolabial Flap Text: The defect edges were debeveled with a #15 scalpel blade. The primary defect was closed partially with a complex linear closure. Given the location of the remaining defect, shape of the defect and the proximity to free margins a melolabial flap was deemed most appropriate for complete closure of the defect. Using a sterile surgical marker, an appropriate advancement flap was drawn incorporating the defect and placing the expected incisions within the relaxed skin tension lines where possible. The area thus outlined was incised deep to adipose tissue with a #15 scalpel blade. The skin margins were undermined to an appropriate distance in all directions utilizing iris scissors. Complex Repair And Rotation Flap Text: The defect edges were debeveled with a #15 scalpel blade. The primary defect was closed partially with a complex linear closure. Given the location of the remaining defect, shape of the defect and the proximity to free margins a rotation flap was deemed most appropriate for complete closure of the defect. Using a sterile surgical marker, an appropriate advancement flap was drawn incorporating the defect and placing the expected incisions within the relaxed skin tension lines where possible. The area thus outlined was incised deep to adipose tissue with a #15 scalpel blade. The skin margins were undermined to an appropriate distance in all directions utilizing iris scissors. Complex Repair And Rhombic Flap Text: The defect edges were debeveled with a #15 scalpel blade. The primary defect was closed partially with a complex linear closure. Given the location of the remaining defect, shape of the defect and the proximity to free margins a rhombic flap was deemed most appropriate for complete closure of the defect. Using a sterile surgical marker, an appropriate advancement flap was drawn incorporating the defect and placing the expected incisions within the relaxed skin tension lines where possible. The area thus outlined was incised deep to adipose tissue with a #15 scalpel blade. The skin margins were undermined to an appropriate distance in all directions utilizing iris scissors. Complex Repair And Transposition Flap Text: The defect edges were debeveled with a #15 scalpel blade. The primary defect was closed partially with a complex linear closure. Given the location of the remaining defect, shape of the defect and the proximity to free margins a transposition flap was deemed most appropriate for complete closure of the defect. Using a sterile surgical marker, an appropriate advancement flap was drawn incorporating the defect and placing the expected incisions within the relaxed skin tension lines where possible. The area thus outlined was incised deep to adipose tissue with a #15 scalpel blade. The skin margins were undermined to an appropriate distance in all directions utilizing iris scissors. Complex Repair And V-Y Plasty Text: The defect edges were debeveled with a #15 scalpel blade. The primary defect was closed partially with a complex linear closure. Given the location of the remaining defect, shape of the defect and the proximity to free margins a V-Y plasty was deemed most appropriate for complete closure of the defect. Using a sterile surgical marker, an appropriate advancement flap was drawn incorporating the defect and placing the expected incisions within the relaxed skin tension lines where possible. The area thus outlined was incised deep to adipose tissue with a #15 scalpel blade. The skin margins were undermined to an appropriate distance in all directions utilizing iris scissors. Complex Repair And M Plasty Text: The defect edges were debeveled with a #15 scalpel blade. The primary defect was closed partially with a complex linear closure. Given the location of the remaining defect, shape of the defect and the proximity to free margins an M plasty was deemed most appropriate for complete closure of the defect. Using a sterile surgical marker, an appropriate advancement flap was drawn incorporating the defect and placing the expected incisions within the relaxed skin tension lines where possible. The area thus outlined was incised deep to adipose tissue with a #15 scalpel blade. The skin margins were undermined to an appropriate distance in all directions utilizing iris scissors. Complex Repair And Double M Plasty Text: The defect edges were debeveled with a #15 scalpel blade. The primary defect was closed partially with a complex linear closure. Given the location of the remaining defect, shape of the defect and the proximity to free margins a double M plasty was deemed most appropriate for complete closure of the defect. Using a sterile surgical marker, an appropriate advancement flap was drawn incorporating the defect and placing the expected incisions within the relaxed skin tension lines where possible. The area thus outlined was incised deep to adipose tissue with a #15 scalpel blade. The skin margins were undermined to an appropriate distance in all directions utilizing iris scissors. Complex Repair And W Plasty Text: The defect edges were debeveled with a #15 scalpel blade. The primary defect was closed partially with a complex linear closure. Given the location of the remaining defect, shape of the defect and the proximity to free margins a W plasty was deemed most appropriate for complete closure of the defect. Using a sterile surgical marker, an appropriate advancement flap was drawn incorporating the defect and placing the expected incisions within the relaxed skin tension lines where possible. The area thus outlined was incised deep to adipose tissue with a #15 scalpel blade. The skin margins were undermined to an appropriate distance in all directions utilizing iris scissors. Complex Repair And Z Plasty Text: The defect edges were debeveled with a #15 scalpel blade. The primary defect was closed partially with a complex linear closure. Given the location of the remaining defect, shape of the defect and the proximity to free margins a Z plasty was deemed most appropriate for complete closure of the defect. Using a sterile surgical marker, an appropriate advancement flap was drawn incorporating the defect and placing the expected incisions within the relaxed skin tension lines where possible. The area thus outlined was incised deep to adipose tissue with a #15 scalpel blade. The skin margins were undermined to an appropriate distance in all directions utilizing iris scissors. Complex Repair And Dorsal Nasal Flap Text: The defect edges were debeveled with a #15 scalpel blade. The primary defect was closed partially with a complex linear closure. Given the location of the remaining defect, shape of the defect and the proximity to free margins a dorsal nasal flap was deemed most appropriate for complete closure of the defect. Using a sterile surgical marker, an appropriate flap was drawn incorporating the defect and placing the expected incisions within the relaxed skin tension lines where possible. The area thus outlined was incised deep to adipose tissue with a #15 scalpel blade. The skin margins were undermined to an appropriate distance in all directions utilizing iris scissors. Complex Repair And Ftsg Text: The defect edges were debeveled with a #15 scalpel blade. The primary defect was closed partially with a complex linear closure. Given the location of the defect, shape of the defect and the proximity to free margins a full thickness skin graft was deemed most appropriate to repair the remaining defect. The graft was trimmed to fit the size of the remaining defect. The graft was then placed in the primary defect, oriented appropriately, and sutured into place. Complex Repair And Split-Thickness Skin Graft Text: The defect edges were debeveled with a #15 scalpel blade. The primary defect was closed partially with a complex linear closure. Given the location of the defect, shape of the defect and the proximity to free margins a split thickness skin graft was deemed most appropriate to repair the remaining defect. The graft was trimmed to fit the size of the remaining defect. The graft was then placed in the primary defect, oriented appropriately, and sutured into place. Complex Repair And Epidermal Autograft Text: The defect edges were debeveled with a #15 scalpel blade. The primary defect was closed partially with a complex linear closure. Given the location of the defect, shape of the defect and the proximity to free margins an epidermal autograft was deemed most appropriate to repair the remaining defect. The graft was trimmed to fit the size of the remaining defect. The graft was then placed in the primary defect, oriented appropriately, and sutured into place. Complex Repair And Dermal Autograft Text: The defect edges were debeveled with a #15 scalpel blade. The primary defect was closed partially with a complex linear closure. Given the location of the defect, shape of the defect and the proximity to free margins an dermal autograft was deemed most appropriate to repair the remaining defect. The graft was trimmed to fit the size of the remaining defect. The graft was then placed in the primary defect, oriented appropriately, and sutured into place. Complex Repair And Tissue Cultured Epidermal Autograft Text: The defect edges were debeveled with a #15 scalpel blade. The primary defect was closed partially with a complex linear closure. Given the location of the defect, shape of the defect and the proximity to free margins an tissue cultured epidermal autograft was deemed most appropriate to repair the remaining defect. The graft was trimmed to fit the size of the remaining defect. The graft was then placed in the primary defect, oriented appropriately, and sutured into place. Complex Repair And Xenograft Text: The defect edges were debeveled with a #15 scalpel blade. The primary defect was closed partially with a complex linear closure. Given the location of the defect, shape of the defect and the proximity to free margins a xenograft was deemed most appropriate to repair the remaining defect. The graft was trimmed to fit the size of the remaining defect. The graft was then placed in the primary defect, oriented appropriately, and sutured into place. Complex Repair And Skin Substitute Graft Text: The defect edges were debeveled with a #15 scalpel blade. The primary defect was closed partially with a complex linear closure. Given the location of the remaining defect, shape of the defect and the proximity to free margins a skin substitute graft was deemed most appropriate to repair the remaining defect. The graft was trimmed to fit the size of the remaining defect. The graft was then placed in the primary defect, oriented appropriately, and sutured into place. Path Notes (To The Dermatopathologist): Please check margins. \\nTagged at 3 oâclock Consent was obtained from the patient. The risks and benefits to therapy were discussed in detail. Specifically, the risks of infection, scarring, bleeding, prolonged wound healing, incomplete removal, allergy to anesthesia, nerve injury and recurrence were addressed. Prior to the procedure, the treatment site was clearly identified and confirmed by the patient. All components of Universal Protocol/PAUSE Rule completed. Post-Care Instructions: I reviewed with the patient in detail post-care instructions. Patient is not to engage in any heavy lifting, exercise, or swimming for the next 14 days. Should the patient develop any fevers, chills, bleeding, severe pain patient will contact the office immediately. Home Suture Removal Text: Patient was provided a home suture removal kit and will remove their sutures at home. If they have any questions or difficulties they will call the office. Where Do You Want The Question To Include Opioid Counseling Located?: Case Summary Tab Information: Selecting Yes will display possible errors in your note based on the variables you have selected. This validation is only offered as a suggestion for you. PLEASE NOTE THAT THE VALIDATION TEXT WILL BE REMOVED WHEN YOU FINALIZE YOUR NOTE. IF YOU WANT TO FAX A PRELIMINARY NOTE YOU WILL NEED TO TOGGLE THIS TO 'NO' IF YOU DO NOT WANT IT IN YOUR FAXED NOTE.

## 2023-08-02 NOTE — PRE-OP CHECKLIST - ALLERGY BAND ON
1. POD#1 CE/IOL OS (Standard) doing well. Use Zylet BID OS Prolensa Qdaily OS: Use all three gtts through completion of PO gtt chart regimen/ Per our instructions given to patient.   Post op Warnings Reiterated RTC as scheduled no known allergies

## 2023-08-02 NOTE — PRE-ANESTHESIA EVALUATION ADULT - NSANTHPEFT_GEN_ALL_CORE
General: AAOx3, NAD  Eyes: The sclera and conjunctiva normal, pupils equal in size.  ENT: The ears and nose were normal in appearance; oropharynx clear, moist mucus membranes.  Neck: The appearance of the neck was normal, with no gross masses or nodules.  Respiratory: Unlabored, no retractions.  CV: a fib  Neurological: No focal deficit, moves all extremities.  Exercise Tolerance:  limited

## 2023-08-02 NOTE — PATIENT PROFILE ADULT - FUNCTIONAL ASSESSMENT - BASIC MOBILITY 6.
3-calculated by average/Not able to assess (calculate score using [facility] averaging method)

## 2023-08-02 NOTE — PROGRESS NOTE ADULT - ASSESSMENT
74 yo male s/p  cystoscopy, bladder biopsies, clot evacuation,  L PCNU placed, R PCN exchanged by IR

## 2023-08-02 NOTE — BRIEF OPERATIVE NOTE - OPERATION/FINDINGS
Very macerated and edematous bladder; unable to pass antegrade wire down R ureter, able to on L but unable to be accessed with semirigid ureteroscope; L PCNU placed, R PCN exchanged by IR; bladder biopsies of L and R lateral walls

## 2023-08-03 ENCOUNTER — TRANSCRIPTION ENCOUNTER (OUTPATIENT)
Age: 73
End: 2023-08-03

## 2023-08-03 VITALS
OXYGEN SATURATION: 97 % | HEART RATE: 92 BPM | DIASTOLIC BLOOD PRESSURE: 70 MMHG | SYSTOLIC BLOOD PRESSURE: 104 MMHG | TEMPERATURE: 98 F | RESPIRATION RATE: 18 BRPM

## 2023-08-03 LAB
ANION GAP SERPL CALC-SCNC: 9 MMOL/L — SIGNIFICANT CHANGE UP (ref 5–17)
BASOPHILS # BLD AUTO: 0.04 K/UL — SIGNIFICANT CHANGE UP (ref 0–0.2)
BASOPHILS NFR BLD AUTO: 0.4 % — SIGNIFICANT CHANGE UP (ref 0–2)
BUN SERPL-MCNC: 26 MG/DL — HIGH (ref 7–23)
CALCIUM SERPL-MCNC: 9.9 MG/DL — SIGNIFICANT CHANGE UP (ref 8.4–10.5)
CHLORIDE SERPL-SCNC: 105 MMOL/L — SIGNIFICANT CHANGE UP (ref 96–108)
CO2 SERPL-SCNC: 22 MMOL/L — SIGNIFICANT CHANGE UP (ref 22–31)
CREAT SERPL-MCNC: 1.19 MG/DL — SIGNIFICANT CHANGE UP (ref 0.5–1.3)
EGFR: 64 ML/MIN/1.73M2 — SIGNIFICANT CHANGE UP
EOSINOPHIL # BLD AUTO: 0 K/UL — SIGNIFICANT CHANGE UP (ref 0–0.5)
EOSINOPHIL NFR BLD AUTO: 0 % — SIGNIFICANT CHANGE UP (ref 0–6)
GLUCOSE SERPL-MCNC: 121 MG/DL — HIGH (ref 70–99)
HCT VFR BLD CALC: 30.2 % — LOW (ref 39–50)
HGB BLD-MCNC: 9.3 G/DL — LOW (ref 13–17)
IMM GRANULOCYTES NFR BLD AUTO: 0.6 % — SIGNIFICANT CHANGE UP (ref 0–0.9)
LYMPHOCYTES # BLD AUTO: 1.27 K/UL — SIGNIFICANT CHANGE UP (ref 1–3.3)
LYMPHOCYTES # BLD AUTO: 12.1 % — LOW (ref 13–44)
MCHC RBC-ENTMCNC: 27.8 PG — SIGNIFICANT CHANGE UP (ref 27–34)
MCHC RBC-ENTMCNC: 30.8 GM/DL — LOW (ref 32–36)
MCV RBC AUTO: 90.4 FL — SIGNIFICANT CHANGE UP (ref 80–100)
MONOCYTES # BLD AUTO: 0.86 K/UL — SIGNIFICANT CHANGE UP (ref 0–0.9)
MONOCYTES NFR BLD AUTO: 8.2 % — SIGNIFICANT CHANGE UP (ref 2–14)
NEUTROPHILS # BLD AUTO: 8.3 K/UL — HIGH (ref 1.8–7.4)
NEUTROPHILS NFR BLD AUTO: 78.7 % — HIGH (ref 43–77)
NRBC # BLD: 0 /100 WBCS — SIGNIFICANT CHANGE UP (ref 0–0)
PLATELET # BLD AUTO: 256 K/UL — SIGNIFICANT CHANGE UP (ref 150–400)
POTASSIUM SERPL-MCNC: 4.5 MMOL/L — SIGNIFICANT CHANGE UP (ref 3.5–5.3)
POTASSIUM SERPL-SCNC: 4.5 MMOL/L — SIGNIFICANT CHANGE UP (ref 3.5–5.3)
RBC # BLD: 3.34 M/UL — LOW (ref 4.2–5.8)
RBC # FLD: 17.3 % — HIGH (ref 10.3–14.5)
SODIUM SERPL-SCNC: 136 MMOL/L — SIGNIFICANT CHANGE UP (ref 135–145)
WBC # BLD: 10.53 K/UL — HIGH (ref 3.8–10.5)
WBC # FLD AUTO: 10.53 K/UL — HIGH (ref 3.8–10.5)

## 2023-08-03 PROCEDURE — 52001 CYSTO W/IRRG&EVAC MLT CLOTS: CPT

## 2023-08-03 PROCEDURE — C9399: CPT

## 2023-08-03 PROCEDURE — 36415 COLL VENOUS BLD VENIPUNCTURE: CPT

## 2023-08-03 PROCEDURE — 76000 FLUOROSCOPY <1 HR PHYS/QHP: CPT

## 2023-08-03 PROCEDURE — 50434 CONVERT NEPHROSTOMY CATHETER: CPT

## 2023-08-03 PROCEDURE — C2625: CPT

## 2023-08-03 PROCEDURE — 86900 BLOOD TYPING SEROLOGIC ABO: CPT

## 2023-08-03 PROCEDURE — 52204 CYSTOSCOPY W/BIOPSY(S): CPT

## 2023-08-03 PROCEDURE — 88305 TISSUE EXAM BY PATHOLOGIST: CPT

## 2023-08-03 PROCEDURE — C1769: CPT

## 2023-08-03 PROCEDURE — C1887: CPT

## 2023-08-03 PROCEDURE — C1758: CPT

## 2023-08-03 PROCEDURE — 85025 COMPLETE CBC W/AUTO DIFF WBC: CPT

## 2023-08-03 PROCEDURE — 50432 PLMT NEPHROSTOMY CATHETER: CPT

## 2023-08-03 PROCEDURE — C1729: CPT

## 2023-08-03 PROCEDURE — 50435 EXCHANGE NEPHROSTOMY CATH: CPT | Mod: 59

## 2023-08-03 PROCEDURE — 86850 RBC ANTIBODY SCREEN: CPT

## 2023-08-03 PROCEDURE — 86901 BLOOD TYPING SEROLOGIC RH(D): CPT

## 2023-08-03 PROCEDURE — C1894: CPT

## 2023-08-03 PROCEDURE — 80048 BASIC METABOLIC PNL TOTAL CA: CPT

## 2023-08-03 RX ORDER — CEFPODOXIME PROXETIL 100 MG
1 TABLET ORAL
Qty: 4 | Refills: 0
Start: 2023-08-03 | End: 2023-08-06

## 2023-08-03 RX ORDER — CEFPODOXIME PROXETIL 100 MG
1 TABLET ORAL
Refills: 0 | DISCHARGE

## 2023-08-03 RX ADMIN — FINASTERIDE 5 MILLIGRAM(S): 5 TABLET, FILM COATED ORAL at 13:20

## 2023-08-03 RX ADMIN — Medication 1000 MILLIGRAM(S): at 07:09

## 2023-08-03 RX ADMIN — Medication 180 MILLIGRAM(S): at 07:09

## 2023-08-03 RX ADMIN — PANTOPRAZOLE SODIUM 40 MILLIGRAM(S): 20 TABLET, DELAYED RELEASE ORAL at 07:09

## 2023-08-03 RX ADMIN — ENOXAPARIN SODIUM 40 MILLIGRAM(S): 100 INJECTION SUBCUTANEOUS at 07:09

## 2023-08-03 RX ADMIN — Medication 1000 MILLIGRAM(S): at 13:20

## 2023-08-03 RX ADMIN — Medication 100 MILLIGRAM(S): at 07:09

## 2023-08-03 NOTE — DISCHARGE NOTE PROVIDER - NSDCMRMEDTOKEN_GEN_ALL_CORE_FT
cefpodoxime 200 mg oral tablet: 1 tab(s) orally once a day  dilTIAZem 180 mg/24 hours oral capsule, extended release: 1 cap(s) orally every 24 hours  finasteride 5 mg oral tablet: 1 tab(s) orally once a day  metoprolol succinate 100 mg oral tablet, extended release: 1 orally 2 times a day  omeprazole 40 mg oral delayed release capsule: 1 orally once a day  tamsulosin 0.4 mg oral capsule: 1 cap(s) orally once a day (at bedtime)

## 2023-08-03 NOTE — DISCHARGE NOTE PROVIDER - NSDCFUSCHEDAPPT_GEN_ALL_CORE_FT
Mike Panda  [facility]  [facility] [address]  Scheduled Appointment: 08/21/2023

## 2023-08-03 NOTE — DISCHARGE NOTE PROVIDER - CARE PROVIDER_API CALL
Josh Robledo  Urology  [address] [place] [id number]  Phone: [phone]  Fax: [phone]  Follow Up Time:

## 2023-08-03 NOTE — PROGRESS NOTE ADULT - NS PANP COMMENT GEN_ALL_CORE FT
Patient seen and examined this AM.  He is feeling relatively well.  All drains working.  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  He will likely need permanent urinary diversion.  Plan for discharge today with outpatient discussion on how to proceed.

## 2023-08-03 NOTE — PROGRESS NOTE ADULT - ASSESSMENT
ASSESSMENT: 73yMale w/ b/l ureteral obstruction s/p Right PCN exchange, left PCNU placement. Patient VSS, HDS, and afebrile.     PLAN:  Diet: Reg  Pain control  Monitor Urine Output  DVT ppx: SCD  Cont Abx: Ceftriaxone   OOB/IS

## 2023-08-03 NOTE — DISCHARGE NOTE NURSING/CASE MANAGEMENT/SOCIAL WORK - PATIENT PORTAL LINK FT
You can access the FollowMyHealth Patient Portal offered by [facility] by registering at the following website: [email or web address]. By joining [employer]’s FollowMyHealth portal, you will also be able to view your health information using other applications (apps) compatible with our system.

## 2023-08-03 NOTE — PROGRESS NOTE ADULT - SUBJECTIVE AND OBJECTIVE BOX
UROLOGY POST OP NOTE (PAGER # [phone])    PROCEDURE: cystoscopy, bladder biopsies, clot evacuation,  L PCNU placed, R PCN exchanged by IR    T(C): 36.5 (08-02-23 @ 13:39), Max: 36.5 (08-02-23 @ 13:39)  HR: 80 (08-02-23 @ 14:07) (79 - 87)  BP: 107/61 (08-02-23 @ 14:07) (104/64 - 129/65)  RR: 27 (08-02-23 @ 14:07) (16 - 27)  SpO2: 97% (08-02-23 @ 14:07) (96% - 99%)  Wt(kg): --  UO: R PCN: 250  L PCN: 50    SUBJECTIVE: no c/o pain. no n/v. no cp/sob.    ON PE: alert and awake    Abdomen: soft, NT, ND    : R PCN intact, urine clear. L PCN intact- urine light pink               
INTERVAL HPI/OVERNIGHT EVENTS:  No acute events overnight.    VITALS:    T(F): 98.1 (08-03-23 @ 05:11), Max: 98.5 (08-03-23 @ 00:28)  HR: 94 (08-03-23 @ 05:11) (73 - 114)  BP: 102/63 (08-03-23 @ 05:11) (102/63 - 129/65)  RR: 19 (08-03-23 @ 05:11) (13 - 30)  SpO2: 96% (08-03-23 @ 05:11) (94% - 100%)  Wt(kg): --    I&O's Detail    02 Aug 2023 07:01  -  03 Aug 2023 05:50  --------------------------------------------------------  IN:    Lactated Ringers: 750 mL  Total IN: 750 mL    OUT:    Drain (mL): 250 mL    Drain (mL): 1450 mL    Indwelling Catheter - Urethral (mL): 30 mL  Total OUT: 1730 mL    Total NET: -980 mL          MEDICATIONS:    ANTIBIOTICS:  cefTRIAXone   IVPB 1000 milliGRAM(s) IV Intermittent every 24 hours      PAIN CONTROL:  acetaminophen     Tablet .. 1000 milliGRAM(s) Oral every 8 hours       MEDS:  tamsulosin 0.4 milliGRAM(s) Oral at bedtime      HEME/ONC  enoxaparin Injectable 40 milliGRAM(s) SubCutaneous every 24 hours        PHYSICAL EXAM:  General: No acute distress.  Alert and Oriented  Abdominal Exam: soft nt/nd. B/L PCN in place.   Exam: Monterroso catheter in place draining well.       LABS:                RADIOLOGY & ADDITIONAL TESTS:

## 2023-08-03 NOTE — DISCHARGE NOTE NURSING/CASE MANAGEMENT/SOCIAL WORK - NSTRANSFERBELONGINGSRESP_GEN_A_NUR
Called patient regarding EGD orders with Dr. Emerson. No answer and message was left for him to return call to direct number given to schedule.   yes

## 2023-08-03 NOTE — DISCHARGE NOTE PROVIDER - NSDCFUADDINST_GEN_ALL_CORE_FT
Monterroso Catheter Instructions:  - Please care for and empty urinary catheter bag as instructed by nurse.    General Discharge Instructions:  Use Tylenol for pain control as needed  Please resume all regular home medications unless specifically advised not to take a particular medication. Also, please take any new medications as prescribed.  Please get plenty of rest, continue to ambulate several times per day, and drink adequate amounts of fluids.     Warning Signs:  Please call your doctor if you experience the following:  *You experience new chest pain, pressure, squeezing or tightness.  *New or worsening cough, shortness of breath, or wheeze.  *If you are vomiting and cannot keep down fluids or your medications.  *You are getting dehydrated due to continued vomiting, diarrhea, or other reasons. Signs of dehydration include dry mouth, rapid heartbeat, or feeling dizzy or faint when standing.  *You see blood or dark/black material when you vomit or have a bowel movement.  *You experience burning when you urinate, have blood in your urine, or experience a discharge.  *Your pain is not improving within 8-12 hours or is not gone within 24 hours. Call or return immediately if your pain is getting worse, changes location, or moves to your chest or back.  *You have shaking chills, or fever greater than 100.4 degrees Fahrenheit.  *Any change in your symptoms, or any new symptoms that concern you.

## 2023-08-03 NOTE — DISCHARGE NOTE PROVIDER - NSDCCPCAREPLAN_GEN_ALL_CORE_FT
PRINCIPAL DISCHARGE DIAGNOSIS  Diagnosis: Bilateral ureteral obstruction  Assessment and Plan of Treatment: Follow up with Dr. Robledo

## 2023-08-03 NOTE — DISCHARGE NOTE PROVIDER - HOSPITAL COURSE
73 year old male with history of b/l ureteral obstruction s/p cysto, bladder biopsies, R PCN exchange, L PCNU 08/02. Surgical course without complications. Hospitalization course without complications. Patient is afebrile, hemodynamically stable and optimized for discharge home.

## 2023-08-04 LAB — SURGICAL PATHOLOGY STUDY: SIGNIFICANT CHANGE UP

## 2023-08-08 PROBLEM — I10 ESSENTIAL (PRIMARY) HYPERTENSION: Chronic | Status: ACTIVE | Noted: 2023-08-01

## 2023-08-08 PROBLEM — Z86.69 PERSONAL HISTORY OF OTHER DISEASES OF THE NERVOUS SYSTEM AND SENSE ORGANS: Chronic | Status: ACTIVE | Noted: 2023-08-01

## 2023-08-08 PROBLEM — C61 MALIGNANT NEOPLASM OF PROSTATE: Chronic | Status: ACTIVE | Noted: 2023-08-02

## 2023-08-08 PROBLEM — K21.9 GASTRO-ESOPHAGEAL REFLUX DISEASE WITHOUT ESOPHAGITIS: Chronic | Status: ACTIVE | Noted: 2023-08-02

## 2023-08-10 ENCOUNTER — APPOINTMENT (OUTPATIENT)
Dept: UROLOGY | Facility: CLINIC | Age: 73
End: 2023-08-10
Payer: MEDICARE

## 2023-08-10 DIAGNOSIS — N31.2 FLACCID NEUROPATHIC BLADDER, NOT ELSEWHERE CLASSIFIED: ICD-10-CM

## 2023-08-10 PROCEDURE — 99214 OFFICE O/P EST MOD 30 MIN: CPT

## 2023-08-13 PROBLEM — N31.2 ATONIC BLADDER: Status: ACTIVE | Noted: 2023-04-24

## 2023-08-13 NOTE — HISTORY OF PRESENT ILLNESS
[FreeTextEntry1] : 4/24/23: 73 yo male with hx of prostate cancer s/p brachytherapy at least 8 years presents for a second opinion regarding his chronic retention of urine.  He has had a catheter in place since January after going into urinary retention.  His catheter has been changed monthly.  He has failed multiple TOV.  He had a urodynamic test on March 6th which showed a desensate bladder with no detrusor activity.  He has a normal bladder capacity.  He had a cystoscopy in February 2022 which showed a small friable prostate without significant obstruction of the bladder neck.  He has been advised to learn CIC vs. have an SPT placed.    His catheter was most recently changed last week.    PSA from April 2023 was < 0.04.  He was recently treated for a proteus UTI.   ************* 5/15/23: Patient returns for follow up for CIC teaching.    CIC teaching was provided by the nursing staff.  Patient is not able to perform CIC, however his wife is able to perform CIC.  Unfortunately, the patient's wife works long days and is not home from early in the morning till later at night.  She is concerned he would not be able to manage with his urinary issues without a catheter while she is out.    ************** 7/11/23: 74 yo male returns for follow up.  He was recently hospitalized for over a week with ANIA (Cr over 4).  He was found to have new B/L hydronephrosis of unclear etiology.  retrograde stent placement was not possible due to inability to identify the UOs as a result of significant bladder edema from a chronic portillo catheter.  B/L PCNs were placed by IR.  His Cr came down to 2 prior to discharge.  He presents today to discuss how to manage his B/L PCN tubes and portillo catheter.    The patient's wife has noted that the right PCN tube is draining more urine than the left.  The portillo catheter has been draining a small volume of blood tinged urine.  he has been holding his eliquis due to hematuria and the recently placed PCN tubes.   ************ 7/13/23: Patient returns today.  His Portillo catheter continues to drain small volume of bloody urine.  The output is bloodier today than Tuesday.    **************** 7/28/23: Patient returns with concern that the right nephrostomy tube has been leaking.  he is otherwise at his baseline with B/L PCN tubes draining clear yellow urine.   The portillo catheter is still hematuric but has lightened since his last visit.  He is scheduled for surgery next Wednesday   Right PCN drainage bag changed.   He had positive Ucx from both PCN tubes from his last visit.   ************** 8/10/23: Benedicto returns for follow up after diagnostic cystoscopy and attempted ureteroscopy.  Bladder biopsy was also performed.  Pathology reveals at least T1 HG bladder ca.  No muscle was in the specimen.  The right UO was completely obliterated with no access to the bladder whcih was attempted in an antergarde approach.  A left nephroureteral stent was able to be placed, but the neoplastic proscess  involves the entirety of the trigone including the left UO.

## 2023-08-13 NOTE — PHYSICAL EXAM
[General Appearance - Well Developed] : well developed [Normal Appearance] : normal appearance [Abdomen Soft] : soft [FreeTextEntry1] : [place] to gravtiy drainage, B/L nephrostomy tubes to gravity [Heart Rate And Rhythm] : Heart rate and rhythm were normal [Skin Color & Pigmentation] : normal skin color and pigmentation [] : no respiratory distress [Oriented To Time, Place, And Person] : oriented to person, place, and time [Normal Station and Gait] : the gait and station were normal for the patient's age [No Focal Deficits] : no focal deficits

## 2023-08-13 NOTE — ASSESSMENT
[FreeTextEntry1] : 74 yo male with new diagnosis if at least T1 HG bladder ca with B/L ureteral obstruction requiring a right PCN tube and left nephroU catheter. We discussed his diagnosis in detail with his son and daughter in the room.  They have already made an appontment to see Dr. Cueva at [facility] and Dr. Singh at [facility] for opinions on managing his bladder cancer.  The portillo catheter was removed as it has caused a lot of discomfort.  It is no longer necessary due to B/L drainage from the kidneys.  They will follow up with Dr. Cueva next week.  I have called him to discuss the case and update him on all relevant clinical issues.   Plan: 1. F/u as schedule with Uro onc next week.  2. Patient to bring all relevant clinical reports to the appointment

## 2023-08-15 ENCOUNTER — NON-APPOINTMENT (OUTPATIENT)
Age: 73
End: 2023-08-15

## 2023-08-17 DIAGNOSIS — N13.5 CROSSING VESSEL AND STRICTURE OF URETER WITHOUT HYDRONEPHROSIS: ICD-10-CM

## 2023-08-17 DIAGNOSIS — R31.0 GROSS HEMATURIA: ICD-10-CM

## 2023-08-17 DIAGNOSIS — N32.89 OTHER SPECIFIED DISORDERS OF BLADDER: ICD-10-CM

## 2023-08-17 DIAGNOSIS — I10 ESSENTIAL (PRIMARY) HYPERTENSION: ICD-10-CM

## 2023-08-17 DIAGNOSIS — K21.9 GASTRO-ESOPHAGEAL REFLUX DISEASE WITHOUT ESOPHAGITIS: ICD-10-CM

## 2023-08-17 DIAGNOSIS — C67.2 MALIGNANT NEOPLASM OF LATERAL WALL OF BLADDER: ICD-10-CM

## 2023-08-17 DIAGNOSIS — N30.91 CYSTITIS, UNSPECIFIED WITH HEMATURIA: ICD-10-CM

## 2023-08-21 ENCOUNTER — APPOINTMENT (OUTPATIENT)
Dept: NEPHROLOGY | Facility: CLINIC | Age: 73
End: 2023-08-21

## 2023-08-21 ENCOUNTER — APPOINTMENT (OUTPATIENT)
Dept: UROLOGY | Facility: CLINIC | Age: 73
End: 2023-08-21
Payer: MEDICARE

## 2023-08-21 PROCEDURE — 99213 OFFICE O/P EST LOW 20 MIN: CPT

## 2023-08-21 NOTE — PHYSICAL EXAM
[General Appearance - Well Developed] : well developed [Normal Appearance] : normal appearance [Abdomen Soft] : soft [Abdomen Tenderness] : non-tender [FreeTextEntry1] : B/L PCNs to gravity drainage [Skin Color & Pigmentation] : normal skin color and pigmentation [Heart Rate And Rhythm] : Heart rate and rhythm were normal [] : no respiratory distress [Oriented To Time, Place, And Person] : oriented to person, place, and time [Normal Station and Gait] : the gait and station were normal for the patient's age [No Focal Deficits] : no focal deficits

## 2023-08-21 NOTE — HISTORY OF PRESENT ILLNESS
[FreeTextEntry1] : 74 y/o M with PMHX of  AFIB, atonic bladder, malignant neoplasm of trigone of urinary bladder, prostate CA, obstructive uropathy, bllateral hydronephrosis, CKD3, obesity, NABILA, DM2, GERD, hx of nephrolithiasis, being seen for follow up visit

## 2023-08-21 NOTE — ASSESSMENT
[FreeTextEntry1] : 72 yo male with at least T1 HG bladder ca, more likely muscle invasive disease due to B/L hydronephrosis.  The nephrostomy drainage bags were changed today.  He will follow up as scheduled for repeat cross sectional imaging to assess for metastatic disease.  He will follow up as scheduled with Dr. Cueva.

## 2023-08-21 NOTE — HISTORY OF PRESENT ILLNESS
[FreeTextEntry1] : 4/24/23: 73 yo male with hx of prostate cancer s/p brachytherapy at least 8 years presents for a second opinion regarding his chronic retention of urine.  He has had a catheter in place since January after going into urinary retention.  His catheter has been changed monthly.  He has failed multiple TOV.  He had a urodynamic test on March 6th which showed a desensate bladder with no detrusor activity.  He has a normal bladder capacity.  He had a cystoscopy in February 2022 which showed a small friable prostate without significant obstruction of the bladder neck.  He has been advised to learn CIC vs. have an SPT placed.    His catheter was most recently changed last week.    PSA from April 2023 was < 0.04.  He was recently treated for a proteus UTI.   ************* 5/15/23: Patient returns for follow up for CIC teaching.    CIC teaching was provided by the nursing staff.  Patient is not able to perform CIC, however his wife is able to perform CIC.  Unfortunately, the patient's wife works long days and is not home from early in the morning till later at night.  She is concerned he would not be able to manage with his urinary issues without a catheter while she is out.    ************** 7/11/23: 72 yo male returns for follow up.  He was recently hospitalized for over a week with ANIA (Cr over 4).  He was found to have new B/L hydronephrosis of unclear etiology.  retrograde stent placement was not possible due to inability to identify the UOs as a result of significant bladder edema from a chronic portillo catheter.  B/L PCNs were placed by IR.  His Cr came down to 2 prior to discharge.  He presents today to discuss how to manage his B/L PCN tubes and portillo catheter.    The patient's wife has noted that the right PCN tube is draining more urine than the left.  The portillo catheter has been draining a small volume of blood tinged urine.  he has been holding his eliquis due to hematuria and the recently placed PCN tubes.   ************ 7/13/23: Patient returns today.  His Portillo catheter continues to drain small volume of bloody urine.  The output is bloodier today than Tuesday.    **************** 7/28/23: Patient returns with concern that the right nephrostomy tube has been leaking.  he is otherwise at his baseline with B/L PCN tubes draining clear yellow urine.   The portillo catheter is still hematuric but has lightened since his last visit.  He is scheduled for surgery next Wednesday   Right PCN drainage bag changed.   He had positive Ucx from both PCN tubes from his last visit.   ************** 8/10/23: Patient returns for follow up after diagnostic cystoscopy and attempted ureteroscopy.  Bladder biopsy was also performed.  Pathology reveals at least T1 HG bladder ca.  No muscle was in the specimen.  The right UO was completely obliterated with no access to the bladder which was attempted in an anterograde approach.  A left nephroureteral stent was able to be placed, but the neoplastic process involves the entirety of the trigone including the left UO.    ************ 8/21/23: Patient returns with leakage from B/L nephrostomy tubes from the bag itself.  The bags have ripped causing them to leak.  He saw Dr. Cueva at [facility] last week for an opinion on his bladder.  He is scheduled for repeat cross sectional imaging to assess for metastatic disease.  He will proceed with cystoprostatectomy and ileal conduit if he has localized disease.   B/L nephrostomy drainage bags were exchanged.

## 2023-09-07 ENCOUNTER — NON-APPOINTMENT (OUTPATIENT)
Age: 73
End: 2023-09-07

## 2023-09-08 ENCOUNTER — APPOINTMENT (OUTPATIENT)
Dept: UROLOGY | Facility: CLINIC | Age: 73
End: 2023-09-08
Payer: MEDICARE

## 2023-09-08 VITALS
DIASTOLIC BLOOD PRESSURE: 75 MMHG | SYSTOLIC BLOOD PRESSURE: 119 MMHG | HEART RATE: 89 BPM | TEMPERATURE: 97.8 F | OXYGEN SATURATION: 99 %

## 2023-09-08 DIAGNOSIS — N13.30 UNSPECIFIED HYDRONEPHROSIS: ICD-10-CM

## 2023-09-08 DIAGNOSIS — C67.0 MALIGNANT NEOPLASM OF TRIGONE OF BLADDER: ICD-10-CM

## 2023-09-08 PROCEDURE — 99213 OFFICE O/P EST LOW 20 MIN: CPT

## 2023-09-08 NOTE — PHYSICAL EXAM
[General Appearance - Well Developed] : well developed [Abdomen Soft] : soft [FreeTextEntry1] : B/L PCN tube sin place, with no evidence of infection at the tube insertion site.  There is only the normal skin changed that occur around a PCN tube

## 2023-09-08 NOTE — HISTORY OF PRESENT ILLNESS
[FreeTextEntry1] : 4/24/23: 73 yo male with hx of prostate cancer s/p brachytherapy at least 8 years presents for a second opinion regarding his chronic retention of urine.  He has had a catheter in place since January after going into urinary retention.  His catheter has been changed monthly.  He has failed multiple TOV.  He had a urodynamic test on March 6th which showed a desensate bladder with no detrusor activity.  He has a normal bladder capacity.  He had a cystoscopy in February 2022 which showed a small friable prostate without significant obstruction of the bladder neck.  He has been advised to learn CIC vs. have an SPT placed.    His catheter was most recently changed last week.    PSA from April 2023 was < 0.04.  He was recently treated for a proteus UTI.   ************* 5/15/23: Patient returns for follow up for CIC teaching.    CIC teaching was provided by the nursing staff.  Patient is not able to perform CIC, however his wife is able to perform CIC.  Unfortunately, the patient's wife works long days and is not home from early in the morning till later at night.  She is concerned he would not be able to manage with his urinary issues without a catheter while she is out.    ************** 7/11/23: 72 yo male returns for follow up.  He was recently hospitalized for over a week with ANIA (Cr over 4).  He was found to have new B/L hydronephrosis of unclear etiology.  retrograde stent placement was not possible due to inability to identify the UOs as a result of significant bladder edema from a chronic portillo catheter.  B/L PCNs were placed by IR.  His Cr came down to 2 prior to discharge.  He presents today to discuss how to manage his B/L PCN tubes and portillo catheter.    The patient's wife has noted that the right PCN tube is draining more urine than the left.  The portillo catheter has been draining a small volume of blood tinged urine.  he has been holding his eliquis due to hematuria and the recently placed PCN tubes.   ************ 7/13/23: Patient returns today.  His Portillo catheter continues to drain small volume of bloody urine.  The output is bloodier today than Tuesday.    **************** 7/28/23: Patient returns with concern that the right nephrostomy tube has been leaking.  he is otherwise at his baseline with B/L PCN tubes draining clear yellow urine.   The portillo catheter is still hematuric but has lightened since his last visit.  He is scheduled for surgery next Wednesday   Right PCN drainage bag changed.   He had positive Ucx from both PCN tubes from his last visit.   ************** 8/10/23: Patient returns for follow up after diagnostic cystoscopy and attempted ureteroscopy.  Bladder biopsy was also performed.  Pathology reveals at least T1 HG bladder ca.  No muscle was in the specimen.  The right UO was completely obliterated with no access to the bladder which was attempted in an anterograde approach.  A left nephroureteral stent was able to be placed, but the neoplastic process involves the entirety of the trigone including the left UO.    ************ 8/21/23: Patient returns with leakage from B/L nephrostomy tubes from the bag itself.  The bags have ripped causing them to leak.  He saw Dr. Cueva at [facility] last week for an opinion on his bladder.  He is scheduled for repeat cross sectional imaging to assess for metastatic disease.  He will proceed with cystoprostatectomy and ileal conduit if he has localized disease.   B/L nephrostomy drainage bags were exchanged.   **************** 9/8/23: Patient returns with concern for possible skin infection around his nephrostomy tube site.  He denies any fevers or chills. He is otherwise in his usual state of health.  he recently had a CT of the chest/abd/pelvis at [place] as part of a metastatic evaluation which confirms that he has Stage IV disease with wide metastatic disease.   He recent had a lung effusion drained which likely formed secondary to metastatic disease. he is scheuled to start chmotherapy and immunotherapy next week.

## 2023-09-08 NOTE — ASSESSMENT
[FreeTextEntry1] : 74 yo male with metastatic bladder ca with B/L ureteral obstruction requiring PCN tubes for drainage. The tubes appear to be in good position without infection.  He will follow up as scheduled for chemo.  He will need to have his nephrostomy tubes changed around the second week of October.  I explained that he can either have that done at [facility] or at [facility].

## 2023-10-31 ENCOUNTER — NON-APPOINTMENT (OUTPATIENT)
Age: 73
End: 2023-10-31

## 2023-11-28 NOTE — PACU DISCHARGE NOTE - MENTAL STATUS: PATIENT PARTICIPATION
What Type Of Note Output Would You Prefer (Optional)?: Standard Output How Severe Are Your Spot(S)?: mild Have Your Spot(S) Been Treated In The Past?: has not been treated Hpi Title: Evaluation of Skin Lesions Additional History: Spot on abdomen above groin has been present for 2 months Awake

## (undated) DEVICE — SOL IRR BAG NS 0.9% 3000ML

## (undated) DEVICE — UROVAC

## (undated) DEVICE — ELCTR PLASMA LOOP MEDIUM ANGLED 24FR 12-30 DEG

## (undated) DEVICE — FOLEY CATH 3-WAY 20FR 30CC LATEX HEMATURIA COUDE

## (undated) DEVICE — VENODYNE/SCD SLEEVE CALF MEDIUM

## (undated) DEVICE — DRAPE TOWEL BLUE 17" X 24"

## (undated) DEVICE — DRAINAGE BAG URINARY 4L

## (undated) DEVICE — WARMING BLANKET UPPER ADULT

## (undated) DEVICE — DRAPE C ARM UNIVERSAL

## (undated) DEVICE — Device

## (undated) DEVICE — BOSTON SCIENTIFIC UROLOK II SCOPE ADAPTOR

## (undated) DEVICE — GOWN ROYAL SILK XL

## (undated) DEVICE — BOSTON SCIENTIFC PUMPING SYSTEM SAPS SINGLE ACTION 10CC

## (undated) DEVICE — PACK CYSTO

## (undated) DEVICE — TUBING RANGER FLUID IRRIGATION SET DISP

## (undated) DEVICE — GLV 7.5 PROTEXIS (WHITE)

## (undated) DEVICE — DRAPE C ARM 41X74"

## (undated) DEVICE — DRAINAGE BAG NEPHROSTOMY URESIL TRU-CLOSE 600ML